# Patient Record
Sex: MALE | Employment: OTHER | URBAN - METROPOLITAN AREA
[De-identification: names, ages, dates, MRNs, and addresses within clinical notes are randomized per-mention and may not be internally consistent; named-entity substitution may affect disease eponyms.]

---

## 2017-04-03 ENCOUNTER — OFFICE VISIT (OUTPATIENT)
Dept: FAMILY MEDICINE CLINIC | Age: 72
End: 2017-04-03

## 2017-04-03 ENCOUNTER — HOSPITAL ENCOUNTER (OUTPATIENT)
Dept: LAB | Age: 72
Discharge: HOME | End: 2017-04-03
Payer: MEDICARE

## 2017-04-03 VITALS
DIASTOLIC BLOOD PRESSURE: 70 MMHG | HEIGHT: 67 IN | WEIGHT: 183 LBS | OXYGEN SATURATION: 95 % | TEMPERATURE: 98.9 F | SYSTOLIC BLOOD PRESSURE: 112 MMHG | BODY MASS INDEX: 28.72 KG/M2 | RESPIRATION RATE: 18 BRPM | HEART RATE: 59 BPM

## 2017-04-03 DIAGNOSIS — R42 DIZZINESS: ICD-10-CM

## 2017-04-03 DIAGNOSIS — Z85.46 HISTORY OF PROSTATE CANCER: ICD-10-CM

## 2017-04-03 DIAGNOSIS — R35.89 POLYURIA: ICD-10-CM

## 2017-04-03 DIAGNOSIS — J06.9 UPPER RESPIRATORY TRACT INFECTION, UNSPECIFIED TYPE: ICD-10-CM

## 2017-04-03 DIAGNOSIS — R42 DIZZINESS: Primary | ICD-10-CM

## 2017-04-03 PROBLEM — Z90.79 HISTORY OF RADICAL PROSTATECTOMY: Status: ACTIVE | Noted: 2017-04-03

## 2017-04-03 LAB
ANION GAP BLD CALC-SCNC: 9 MMOL/L (ref 3–18)
APPEARANCE UR: CLEAR
BILIRUB UR QL: NEGATIVE
BUN SERPL-MCNC: 21 MG/DL (ref 7–18)
BUN/CREAT SERPL: 15 (ref 12–20)
CALCIUM SERPL-MCNC: 8.2 MG/DL (ref 8.5–10.1)
CHLORIDE SERPL-SCNC: 102 MMOL/L (ref 100–108)
CO2 SERPL-SCNC: 25 MMOL/L (ref 21–32)
COLOR UR: YELLOW
CREAT SERPL-MCNC: 1.39 MG/DL (ref 0.6–1.3)
GLUCOSE SERPL-MCNC: 99 MG/DL (ref 74–99)
GLUCOSE UR STRIP.AUTO-MCNC: NEGATIVE MG/DL
HGB UR QL STRIP: NEGATIVE
KETONES UR QL STRIP.AUTO: NEGATIVE MG/DL
LEUKOCYTE ESTERASE UR QL STRIP.AUTO: NEGATIVE
NITRITE UR QL STRIP.AUTO: NEGATIVE
PH UR STRIP: 5 [PH] (ref 5–8)
POTASSIUM SERPL-SCNC: 4.2 MMOL/L (ref 3.5–5.5)
PROT UR STRIP-MCNC: NEGATIVE MG/DL
SODIUM SERPL-SCNC: 136 MMOL/L (ref 136–145)
SP GR UR REFRACTOMETRY: 1.02 (ref 1–1.03)
UROBILINOGEN UR QL STRIP.AUTO: 0.2 EU/DL (ref 0.2–1)

## 2017-04-03 PROCEDURE — 80048 BASIC METABOLIC PNL TOTAL CA: CPT | Performed by: INTERNAL MEDICINE

## 2017-04-03 PROCEDURE — 81003 URINALYSIS AUTO W/O SCOPE: CPT | Performed by: INTERNAL MEDICINE

## 2017-04-03 PROCEDURE — 36415 COLL VENOUS BLD VENIPUNCTURE: CPT | Performed by: INTERNAL MEDICINE

## 2017-04-03 PROCEDURE — 84153 ASSAY OF PSA TOTAL: CPT | Performed by: INTERNAL MEDICINE

## 2017-04-03 RX ORDER — AZITHROMYCIN 250 MG/1
TABLET, FILM COATED ORAL
Qty: 6 TAB | Refills: 0 | Status: SHIPPED | OUTPATIENT
Start: 2017-04-03 | End: 2017-04-08

## 2017-04-03 NOTE — PROGRESS NOTES
1. Have you been to the ER, urgent care clinic since your last visit? Hospitalized since your last visit? Yes, [facility] last week  , cough      2. Have you seen or consulted any other health care providers outside of the [facility] since your last visit? Include any pap smears or colon screening.  No

## 2017-04-03 NOTE — MR AVS SNAPSHOT
Visit Information Date & Time Provider Department Dept. Phone Encounter #  
 4/3/2017  1:15 PM Julstoney Wolfeau, 3 [facility] [phone] [id number] Upcoming Health Maintenance Date Due  
 MEDICARE YEARLY EXAM 9/3/2008 INFLUENZA AGE 9 TO ADULT 8/1/2016 GLAUCOMA SCREENING Q2Y 4/1/2018 COLON CANCER SCRN (BARIUM / CT COLO / FLX SIG) Q5 6/1/2020 DTaP/Tdap/Td series (2 - Td) 8/4/2026 Allergies as of 4/3/2017  Review Complete On: 4/3/2017 By: Juliaette Landau, MD  
 No Known Allergies Current Immunizations  Never Reviewed No immunizations on file. Not reviewed this visit You Were Diagnosed With   
  
 Codes Comments Dizziness    -  Primary ICD-10-CM: K14 ICD-9-CM: 780.4 Upper respiratory tract infection, unspecified type     ICD-10-CM: J06.9 ICD-9-CM: 465.9 Vitals BP Pulse Temp Resp Height(growth percentile) Weight(growth percentile) 112/70 (BP 1 Location: Left arm, BP Patient Position: Sitting) (!) 59 98.9 °F (37.2 °C) (Oral) 18 5' 7\" (1.702 m) 183 lb (83 kg) SpO2 BMI Smoking Status 95% 28.66 kg/m2 Never Smoker Vitals History BMI and BSA Data Body Mass Index Body Surface Area  
 28.66 kg/m 2 1.98 m 2 Preferred Pharmacy Pharmacy Name Phone [address] [place] [address] [phone] Your Updated Medication List  
  
   
This list is accurate as of: 4/3/17  1:36 PM.  Always use your most recent med list.  
  
  
  
  
 ascorbic acid (vitamin C) 500 mg tablet Commonly known as:  VITAMIN C Take  by mouth. aspirin delayed-release 81 mg tablet Take  by mouth daily. azithromycin 250 mg tablet Commonly known as:  Laban Mow Take 2 tablets today, then take 1 tablet daily BILBERRY PO Take  by mouth. COQ10  PO Take  by mouth.  
  
 cyanocobalamin 500 mcg tablet Commonly known as:  VITAMIN B12  
 Take 500 mcg by mouth daily. cyclobenzaprine 10 mg tablet Commonly known as:  FLEXERIL Take  by mouth three (3) times daily as needed for Muscle Spasm(s). gabapentin 300 mg capsule Commonly known as:  NEURONTIN Take 300 mg by mouth as needed. garlic 7,700 mg Cap Take  by mouth.  
  
 ginger (Zingiber officinalis) 250 mg Cap Take  by mouth.  
  
 ginkgo biloba 60 mg Cap Take  by mouth.  
  
 krill oil 500 mg Cap Take  by mouth.  
  
 lutein 40 mg Cap Take  by mouth.  
  
 meloxicam 7.5 mg tablet Commonly known as:  MOBIC Take 7.5 mg by mouth as needed for Pain.  
  
 metoprolol succinate 25 mg XL tablet Commonly known as:  TOPROL-XL Take 50 mg by mouth daily. montelukast 10 mg tablet Commonly known as:  SINGULAIR Take 1 Tab by mouth daily. multivitamin tablet Commonly known as:  ONE A DAY Take 1 Tab by mouth daily. pantoprazole 40 mg tablet Commonly known as:  PROTONIX Take 1 Tab by mouth daily. traMADol 50 mg tablet Commonly known as:  ULTRAM  
Take 50 mg by mouth every six (6) hours as needed for Pain.  
  
 vitamin E 400 unit capsule Commonly known as:  [address] Take  by mouth daily. Prescriptions Sent to Pharmacy Refills  
 azithromycin (ZITHROMAX) 250 mg tablet 0 Sig: Take 2 tablets today, then take 1 tablet daily Class: Normal  
 Pharmacy: [address] [place] [address] #: [phone] [facility]! Dear Kain Henson: Thank you for requesting a [employer] account. Our records indicate that you already have an active [email or web address] account. You can access your account anytime at [email or web address] Did you know that you can access your hospital and ER discharge instructions at any time in [email or web address]? You can also review all of your test results from your hospital stay or ER visit. Additional Information If you have questions, please visit the Frequently Asked Questions section of the Hyd[employer]art website at [email or web address] Remember, [employer] is NOT to be used for urgent needs. For medical emergencies, dial 911. Now available from your iPhone and Android! Please provide this summary of care documentation to your next provider. Your primary care clinician is listed as Katerin Ding. If you have any questions after today's visit, please call [phone].

## 2017-04-03 NOTE — PROGRESS NOTES
Chief Complaint   Patient presents with    Cough    Sore Throat     Assessment/Plan  1. Dizziness  -likely from low bp. If bp remains low, hold metoprolol. Keep monitoring bp at home. - METABOLIC PANEL, BASIC; Future    2. Upper respiratory tract infection, unspecified type  - azithromycin (ZITHROMAX) 250 mg tablet; Take 2 tablets today, then take 1 tablet daily  Dispense: 6 Tab; Refill: 0    3. History of prostate cancer, s/p radical prostatectomy. Ck psa.  - PSA W/ REFLX FREE PSA; Future    4. Polyuria  - URINALYSIS W/ RFLX MICROSCOPIC; Future    The plan was discussed with the patient. The patient verbalized understanding and is in agreement with the plan. All medication potential side effects were discussed with the patient. SUBJECTIVE:   Betty Falk is a 66 y.o. male who complains of nasal congestion, sore throat, vocal hoarseness, non-productive cough x 7 days. Was seen 3 days ago by [facility], treated with cough medication. No antibiotics. No f/c.  +arthralgia, diffusely. No muscle pain. +HA. Today he had episode of dizziness and fell. Does notes some dyspnea with exertion. He notes polyuria. Has h/o prostate ca, s/p radical prostatectomy. No change/increase in fluid intake.       Review of Systems - ENT ROS: positive for - headaches, nasal congestion, nasal discharge and sore throat  Respiratory ROS: positive for - cough  Cardiovascular ROS: no chest pain or dyspnea on exertion  Gastrointestinal ROS: no abdominal pain, change in bowel habits, or black or bloody stools  Musculoskeletal ROS: negative  Neurological ROS: negative    Physical Examination:   Visit Vitals    /70 (BP 1 Location: Left arm, BP Patient Position: Sitting)    Pulse (!) 59    Temp 98.9 °F (37.2 °C) (Oral)    Resp 18    Ht 5' 7\" (1.702 m)    Wt 183 lb (83 kg)    SpO2 95%    BMI 28.66 kg/m2       Constitutional: Well developed, nourished, no distress, alert   HENT: Exterior ears and tympanic membranes normal bilaterally. Supple neck. No thyromegaly or lymphadenopathy. Oropharynx clear and moist mucous membranes. Eyes: Conjunctiva normal. PERRL. CV: S1, S2.  RRR. No murmurs/rubs. No thrills palpated. No carotid bruits. Intact distal pulses. No edema. Pulm: No abnormalities on inspection. Clear to auscultation bilaterally. No wheezing/rhonchi. Normal effort.              Salima Collins MD

## 2017-04-04 LAB
PSA SERPL-MCNC: <0.1 NG/ML (ref 0–4)
REFLEX CRITERIA: NORMAL

## 2017-04-10 ENCOUNTER — TELEPHONE (OUTPATIENT)
Dept: FAMILY MEDICINE CLINIC | Age: 72
End: 2017-04-10

## 2017-04-10 NOTE — TELEPHONE ENCOUNTER
Pt's wife called in regards to the pt. He is also not feeling any better. He is still coughing but she states that his cough is more tight. Jazzminese advise.

## 2017-04-12 RX ORDER — PREDNISONE 10 MG/1
TABLET ORAL
Qty: 21 TAB | Refills: 0 | Status: SHIPPED | OUTPATIENT
Start: 2017-04-12 | End: 2017-11-16 | Stop reason: ALTCHOICE

## 2017-10-18 PROBLEM — K42.9 UMBILICAL HERNIA WITHOUT OBSTRUCTION AND WITHOUT GANGRENE: Status: ACTIVE | Noted: 2017-10-18

## 2017-11-16 ENCOUNTER — OFFICE VISIT (OUTPATIENT)
Dept: FAMILY MEDICINE CLINIC | Age: 72
End: 2017-11-16

## 2017-11-16 VITALS
WEIGHT: 182 LBS | SYSTOLIC BLOOD PRESSURE: 130 MMHG | BODY MASS INDEX: 28.56 KG/M2 | HEART RATE: 74 BPM | RESPIRATION RATE: 20 BRPM | TEMPERATURE: 98.1 F | DIASTOLIC BLOOD PRESSURE: 82 MMHG | OXYGEN SATURATION: 95 % | HEIGHT: 67 IN

## 2017-11-16 DIAGNOSIS — R10.13 EPIGASTRIC PAIN: ICD-10-CM

## 2017-11-16 DIAGNOSIS — K21.9 GASTROESOPHAGEAL REFLUX DISEASE, ESOPHAGITIS PRESENCE NOT SPECIFIED: Primary | ICD-10-CM

## 2017-11-16 DIAGNOSIS — N52.9 ERECTILE DYSFUNCTION, UNSPECIFIED ERECTILE DYSFUNCTION TYPE: ICD-10-CM

## 2017-11-16 DIAGNOSIS — R05.3 CHRONIC COUGH: ICD-10-CM

## 2017-11-16 NOTE — PROGRESS NOTES
Verner Cowden is a 74 y.o. male is here for cough. 1. Have you been to the ER, urgent care clinic since your last visit? Hospitalized since your last visit? No    2. Have you seen or consulted any other health care providers outside of the [address] [place] [address] since your last visit? Include any pap smears or colon screening.  No     Health Maintenance Due   Topic Date Due    MEDICARE YEARLY EXAM  09/03/2008    Influenza Age 5 to Adult  08/01/2017

## 2017-11-16 NOTE — MR AVS SNAPSHOT
Visit Information Date & Time Provider Department Dept. Phone Encounter #  
 11/16/2017  1:15 PM Erlin Carmen, 3 [facility] [phone] [id number] Upcoming Health Maintenance Date Due  
 MEDICARE YEARLY EXAM 9/3/2008 Influenza Age 5 to Adult 8/1/2017 GLAUCOMA SCREENING Q2Y 4/1/2018 COLON CANCER SCRN (BARIUM / CT COLO / FLX SIG) Q5 6/1/2020 DTaP/Tdap/Td series (2 - Td) 8/4/2026 Allergies as of 11/16/2017  Review Complete On: 11/16/2017 By: Erlin Carmen MD  
 No Known Allergies Current Immunizations  Never Reviewed No immunizations on file. Not reviewed this visit You Were Diagnosed With   
  
 Codes Comments Gastroesophageal reflux disease, esophagitis presence not specified    -  Primary ICD-10-CM: K21.9 ICD-9-CM: 530.81 Epigastric pain     ICD-10-CM: R10.13 ICD-9-CM: 789.06 Chronic cough     ICD-10-CM: R05 ICD-9-CM: 518. 2 Vitals BP Pulse Temp Resp Height(growth percentile) Weight(growth percentile) 130/82 (BP 1 Location: Left arm, BP Patient Position: Sitting) 74 98.1 °F (36.7 °C) (Oral) 20 5' 7\" (1.702 m) 182 lb (82.6 kg) SpO2 BMI Smoking Status 95% 28.51 kg/m2 Never Smoker BMI and BSA Data Body Mass Index Body Surface Area 28.51 kg/m 2 1.98 m 2 Preferred Pharmacy Pharmacy Name Phone [address] [place] [address] [phone] Your Updated Medication List  
  
   
This list is accurate as of: 11/16/17  1:34 PM.  Always use your most recent med list.  
  
  
  
  
 ascorbic acid (vitamin C) 500 mg tablet Commonly known as:  VITAMIN C Take  by mouth. aspirin delayed-release 81 mg tablet Take  by mouth daily. BILBERRY PO Take  by mouth. COQ10  PO Take  by mouth.  
  
 cyanocobalamin 500 mcg tablet Commonly known as:  VITAMIN B12 Take 500 mcg by mouth daily. garlic 2,202 mg Cap Take  by mouth.  
  
 cisco (Zingiber officinalis) 250 mg Cap Take  by mouth.  
  
 ginkgo biloba 60 mg Cap Take  by mouth.  
  
 krill oil 500 mg Cap Take  by mouth.  
  
 montelukast 10 mg tablet Commonly known as:  SINGULAIR Take 1 Tab by mouth daily. multivitamin tablet Commonly known as:  ONE A DAY Take 1 Tab by mouth daily. pantoprazole 40 mg tablet Commonly known as:  PROTONIX Take 1 Tab by mouth daily. vitamin E 400 unit capsule Commonly known as:  [address] Take  by mouth daily. We Performed the Following REFERRAL TO GASTROENTEROLOGY [YIN20 Custom] Comments:  
 Please evaluate patient for epigastric pain, gerd. To-Do List   
 Around 11/16/2017 PFT:  PULMONARY FUNCTION TEST Referral Information Referral ID Referred By Referred To  
  
 [id number] [facility] [place] [address] [address] [place], [address] [place] [address] Phone: [phone] Fax: [phone] Visits Status Start Date End Date 1 New Request 11/16/17 11/16/18 If your referral has a status of pending review or denied, additional information will be sent to support the outcome of this decision. Patient Instructions Lung Function Tests: About These Tests What are these tests? Lung function tests measure how much air your lungs hold and how quickly your lungs can move the air in and out. Spirometry is often the first lung function test that is done. You may also have other tests, such as gas diffusion tests, body plethysmography, inhalation challenge tests, and exercise stress tests. Your doctor will explain which tests you need. These tests check how well your lungs work. They may also be called pulmonary function tests, or PFTs. Why are these tests done? Doctors use lung function tests to find the cause of breathing problems and diagnose lung diseases like asthma or emphysema.  You may have lung function tests before you have surgery. Or your doctor may use lung function tests to find out how well treatment for a lung problem is working. How can you prepare for these tests? · Tell your doctor if you have any other medical problems, such as heart disease. · Tell your doctor if you are allergic to any medicines. · Let your doctor know if you take medicines for a lung problem. You may need to stop some of them before the tests. What else should you know before these tests? · Wear loose clothing that does not restrict your breathing. · If you have dentures, wear them during the test to help you form a tight seal around the spirometer's mouthpiece. · Do not eat a large meal just before the test. A full stomach may keep your lungs from fully expanding. · For 6 hours before the test, do not smoke or exercise hard. What happens during these tests? What happens during the test depends on the type of test you have. A respiratory therapist or technician will do the lung function tests. For most tests, you will wear a nose clip. This is to make sure that no air passes in or out of your nose during the test. You then breathe into a mouthpiece attached to a recording device. · For some tests, you breathe in and out as deeply and quickly as you can. · You may repeat some tests after you inhale a medicine that expands your airways. · You may breathe certain gases, such as 100% oxygen or a mixture of helium and air. · For body plethysmography, you sit inside a small mcnamara with windows. The mcnamara measures pressure changes that occur as you breathe. The therapist may urge you to breathe deeply during some of the tests to get the best results. You may have a blood test to check oxygen and carbon dioxide levels in your blood before, during, or after your lung function tests. How long do these tests take? The testing may take from 5 to 30 minutes, depending on how many tests you have. What happens after these tests? · You will probably be able to go home right after the tests. · You can go back to your normal activities right away. Follow-up care is a key part of your treatment and safety. Be sure to make and go to all appointments, and call your doctor if you are having problems. It's also a good idea to know your test results and keep a list of the medicines you take. Where can you learn more? Go to http://stephanie-karime.info/. Enter O827 in the search box to learn more about \"Lung Function Tests: About These Tests. \" Current as of: May 12, 2017 Content Version: 11.4 © 1078-3317 [employer]. Care instructions adapted under license by [employer] (which disclaims liability or warranty for this information). If you have questions about a medical condition or this instruction, always ask your healthcare professional. [address] any warranty or liability for your use of this information. Introducing [facility] & HEALTH SERVICES! Dear Vandana Prakash: Thank you for requesting a [employer] account. Our records indicate that you already have an active [employer] account. You can access your account anytime at [email or web address] Did you know that you can access your hospital and ER discharge instructions at any time in [employer]? You can also review all of your test results from your hospital stay or ER visit. Additional Information If you have questions, please visit the Frequently Asked Questions section of the [employer] website at [email or web address] Remember, [employer] is NOT to be used for urgent needs. For medical emergencies, dial 911. Now available from your iPhone and Android! Please provide this summary of care documentation to your next provider. Your primary care clinician is listed as Katerin Ding.  If you have any questions after today's visit, please call [phone].

## 2017-11-16 NOTE — PROGRESS NOTES
Assessment/Plan:    1. Gastroesophageal reflux disease, esophagitis presence not specified and epigastric pain - needs endo to r/o gastritis/pud.    - REFERRAL TO GASTROENTEROLOGY    2. Chronic cough  -ddx includes reflux related vs allergy/postnasal drip. Given dyspnea, will do PFts with methacholine challenge. Consider trial of flonase.   - PULMONARY FUNCTION TEST; Future    The plan was discussed with the patient. The patient verbalized understanding and is in agreement with the plan. All medication potential side effects were discussed with the patient. Health Maintenance:   Health Maintenance   Topic Date Due    MEDICARE YEARLY EXAM  09/03/2008    Influenza Age 9 to Adult  08/01/2017    GLAUCOMA SCREENING Q2Y  04/01/2018    COLON CANCER SCRN (BARIUM / CT COLO / FLX SIG) Q5  06/01/2020    DTaP/Tdap/Td series (2 - Td) 08/04/2026    ZOSTER VACCINE AGE 60>  Completed    Pneumococcal 65+ Low/Medium Risk  Completed       Amparo Desouza is a 74 y.o. male and presents with Cough and Breathing Problem     Subjective:  His wife helps present the hx. Pt c/o epigastric pain, burning sensation x several months. He [place] here. He's on protonix 40 bid for gerd and still having reflux sx. They are requesting GI referral.  Tried probiotics. Pt c/o \"throat closing up on him\" where he can't breathe and coughs. This happens randomly. Does have allergies, on singulair. Has been treated with immunotherapy as well. Has had swallow study, which was neg. Had neg fiberoptic laryngoscopy by ENT in [facility], who proposed PND as the source of his cough. He said his chronic cough started after starting lisinopril for bp. He is concerned about cancer b/c there is a [facility] prevalance of cancer in [address] [place]". He can't identify a trigger or pattern to the coughing episode. Hasn't had lung function test. Does have some dyspnea that is constant.      ROS:  Constitutional: No recent weight change. No weakness/fatigue. No f/c. HENT: No HA, dizziness. No hearing loss/tinnitus. No nasal congestion/discharge. Eyes: No change in vision, double/blurred vision or eye pain/redness. Cardiovascular: No CP/palpitations. No BURGER/orthopnea/PND. Respiratory: + cough/no sputum, +dyspnea, no wheezing. Gastointestinal: No dysphagia,+ reflux. No n/v. No constipation/diarrhea. No melena/rectal bleeding. Allergy/Immunology: + seasonal rhinitis. Denies frequent colds, sinus/ear infections. The problem list was updated as a part of today's visit. Patient Active Problem List   Diagnosis Code    Environmental allergies Z91.09    Gastroesophageal reflux disease without esophagitis K21.9    Hearing loss of left ear H91.92    Mixed hypercholesterolemia and hypertriglyceridemia E78.2    Chronic cough R05    Muscle weakness M62.81    History of prostate cancer Z85.46    History of radical prostatectomy X85.45    Umbilical hernia without obstruction and without gangrene K42.9       The PSH, FH were reviewed. SH:  Social History   Substance Use Topics    Smoking status: Never Smoker    Smokeless tobacco: Never Used    Alcohol use No       Medications/Allergies:  Current Outpatient Prescriptions on File Prior to Visit   Medication Sig Dispense Refill    pantoprazole (PROTONIX) 40 mg tablet Take 1 Tab by mouth daily. 90 Tab 3    montelukast (SINGULAIR) 10 mg tablet Take 1 Tab by mouth daily. 90 Tab 1    aspirin delayed-release 81 mg tablet Take  by mouth daily.  BILBERRY PO Take  by mouth.  UBIDECARENONE/VITAMIN E MIXED (COQ10  PO) Take  by mouth.  garlic 8,566 mg cap Take  by mouth.  cisco, Zingiber officinalis, 250 mg cap Take  by mouth.  ginkgo biloba 60 mg cap Take  by mouth.  krill oil 500 mg cap Take  by mouth.  multivitamin (ONE A DAY) tablet Take 1 Tab by mouth daily.       cyanocobalamin (VITAMIN B12) 500 mcg tablet Take 500 mcg by mouth daily.  ascorbic acid, vitamin C, (VITAMIN C) 500 mg tablet Take  by mouth.  vitamin E (AQUA GEMS) 400 unit capsule Take  by mouth daily. No current facility-administered medications on file prior to visit. No Known Allergies    Objective:  Visit Vitals    /82 (BP 1 Location: Left arm, BP Patient Position: Sitting)    Pulse 74    Temp 98.1 °F (36.7 °C) (Oral)    Resp 20    Ht 5' 7\" (1.702 m)    Wt 182 lb (82.6 kg)    SpO2 95%    BMI 28.51 kg/m2      Constitutional: Well developed, nourished, no distress, alert   HENT: Exterior ears and tympanic membranes normal bilaterally. Supple neck. No thyromegaly or lymphadenopathy. Oropharynx clear and moist mucous membranes. Eyes: Conjunctiva normal. PERRL. CV: S1, S2.  RRR. No murmurs/rubs. No thrills palpated. No carotid bruits. Intact distal pulses. No edema. Pulm: No abnormalities on inspection. Clear to auscultation bilaterally. No wheezing/rhonchi. Normal effort.

## 2017-11-16 NOTE — PATIENT INSTRUCTIONS
Lung Function Tests: About These Tests  What are these tests? Lung function tests measure how much air your lungs hold and how quickly your lungs can move the air in and out. Spirometry is often the first lung function test that is done. You may also have other tests, such as gas diffusion tests, body plethysmography, inhalation challenge tests, and exercise stress tests. Your doctor will explain which tests you need. These tests check how well your lungs work. They may also be called pulmonary function tests, or PFTs. Why are these tests done? Doctors use lung function tests to find the cause of breathing problems and diagnose lung diseases like asthma or emphysema. You may have lung function tests before you have surgery. Or your doctor may use lung function tests to find out how well treatment for a lung problem is working. How can you prepare for these tests? · Tell your doctor if you have any other medical problems, such as heart disease. · Tell your doctor if you are allergic to any medicines. · Let your doctor know if you take medicines for a lung problem. You may need to stop some of them before the tests. What else should you know before these tests? · Wear loose clothing that does not restrict your breathing. · If you have dentures, wear them during the test to help you form a tight seal around the spirometer's mouthpiece. · Do not eat a large meal just before the test. A full stomach may keep your lungs from fully expanding. · For 6 hours before the test, do not smoke or exercise hard. What happens during these tests? What happens during the test depends on the type of test you have. A respiratory therapist or technician will do the lung function tests. For most tests, you will wear a nose clip. This is to make sure that no air passes in or out of your nose during the test. You then breathe into a mouthpiece attached to a recording device.   · For some tests, you breathe in and out as deeply and quickly as you can. · You may repeat some tests after you inhale a medicine that expands your airways. · You may breathe certain gases, such as 100% oxygen or a mixture of helium and air. · For body plethysmography, you sit inside a small mcnamara with windows. The mcnamara measures pressure changes that occur as you breathe. The therapist may urge you to breathe deeply during some of the tests to get the best results. You may have a blood test to check oxygen and carbon dioxide levels in your blood before, during, or after your lung function tests. How long do these tests take? The testing may take from 5 to 30 minutes, depending on how many tests you have. What happens after these tests? · You will probably be able to go home right after the tests. · You can go back to your normal activities right away. Follow-up care is a key part of your treatment and safety. Be sure to make and go to all appointments, and call your doctor if you are having problems. It's also a good idea to know your test results and keep a list of the medicines you take. Where can you learn more? Go to http://stephanie-karime.info/. Enter C470 in the search box to learn more about \"Lung Function Tests: About These Tests. \"  Current as of: May 12, 2017  Content Version: 11.4  © 0399-4248 [employer]. Care instructions adapted under license by [employer] (which disclaims liability or warranty for this information). If you have questions about a medical condition or this instruction, always ask your healthcare professional. [address] any warranty or liability for your use of this information.

## 2017-12-18 DIAGNOSIS — R05.3 CHRONIC COUGH: ICD-10-CM

## 2018-01-02 PROBLEM — K21.00 REFLUX ESOPHAGITIS: Status: ACTIVE | Noted: 2018-01-02

## 2018-01-02 RX ORDER — LOSARTAN POTASSIUM 50 MG/1
50 TABLET ORAL DAILY
COMMUNITY
Start: 2018-01-02 | End: 2019-12-11 | Stop reason: ALTCHOICE

## 2018-03-15 ENCOUNTER — TELEPHONE (OUTPATIENT)
Dept: FAMILY MEDICINE CLINIC | Age: 73
End: 2018-03-15

## 2018-03-15 NOTE — TELEPHONE ENCOUNTER
[facility] states [facility] no longer carries Caverject and they now carry Edex 20mcg. Patient is request that an electronic prescription for the E[facility]

## 2018-03-15 NOTE — TELEPHONE ENCOUNTER
Please read him my mychart message -I've never prescribed this for him. What is he taking this for ED?

## 2018-03-16 NOTE — TELEPHONE ENCOUNTER
Spoke with patient, verified with 2 identifiers. Patient stated that he is unsure of who originally prescribed Caverject to him, he recalls it being Dr. Eula Seo a while back. Patient stated that he is using medication for ED however and Caverject is no longer manufactured according to [facility].

## 2018-03-19 PROBLEM — N52.9 ERECTILE DYSFUNCTION: Status: ACTIVE | Noted: 2018-03-19

## 2019-01-04 ENCOUNTER — OFFICE VISIT (OUTPATIENT)
Dept: FAMILY MEDICINE CLINIC | Age: 74
End: 2019-01-04

## 2019-01-04 VITALS
WEIGHT: 182 LBS | DIASTOLIC BLOOD PRESSURE: 70 MMHG | OXYGEN SATURATION: 95 % | BODY MASS INDEX: 28.56 KG/M2 | SYSTOLIC BLOOD PRESSURE: 110 MMHG | HEART RATE: 67 BPM | RESPIRATION RATE: 18 BRPM | HEIGHT: 67 IN | TEMPERATURE: 97.8 F

## 2019-01-04 DIAGNOSIS — R53.83 FATIGUE, UNSPECIFIED TYPE: ICD-10-CM

## 2019-01-04 DIAGNOSIS — M25.561 CHRONIC PAIN OF BOTH KNEES: ICD-10-CM

## 2019-01-04 DIAGNOSIS — M25.562 CHRONIC PAIN OF BOTH KNEES: ICD-10-CM

## 2019-01-04 DIAGNOSIS — G89.29 CHRONIC PAIN OF BOTH KNEES: ICD-10-CM

## 2019-01-04 DIAGNOSIS — M25.552 LEFT HIP PAIN: Primary | ICD-10-CM

## 2019-01-04 DIAGNOSIS — E78.2 MIXED HYPERCHOLESTEROLEMIA AND HYPERTRIGLYCERIDEMIA: ICD-10-CM

## 2019-01-04 DIAGNOSIS — E03.9 ACQUIRED HYPOTHYROIDISM: ICD-10-CM

## 2019-01-04 DIAGNOSIS — Z00.00 ROUTINE GENERAL MEDICAL EXAMINATION AT A HEALTH CARE FACILITY: ICD-10-CM

## 2019-01-04 RX ORDER — LORATADINE 10 MG/1
10 TABLET ORAL
COMMUNITY
End: 2019-11-13 | Stop reason: ALTCHOICE

## 2019-01-04 RX ORDER — LEVOTHYROXINE SODIUM 50 UG/1
100 TABLET ORAL
COMMUNITY
End: 2021-05-19 | Stop reason: ALTCHOICE

## 2019-01-04 RX ORDER — NAPROXEN 500 MG/1
500 TABLET ORAL 2 TIMES DAILY WITH MEALS
Qty: 180 TAB | Refills: 0 | Status: SHIPPED | OUTPATIENT
Start: 2019-01-04 | End: 2019-11-13 | Stop reason: ALTCHOICE

## 2019-01-04 RX ORDER — ALENDRONATE SODIUM 70 MG/1
70 TABLET ORAL
COMMUNITY

## 2019-01-04 NOTE — PROGRESS NOTES
Assessment/Plan: 1. Left hip pain 
-suspect related more to chronic bilat knee arthritis/gait. Referral ortho. Ck labs. And xray. Naprosyn prn. 
- XR HIP LT W OR WO PELV 2-3 VWS; Future 
- naproxen (NAPROSYN) 500 mg tablet; Take 1 Tab by mouth two (2) times daily (with meals). Dispense: 180 Tab; Refill: 0 
- CYCLIC CITRUL PEPTIDE AB, IGG; Future - JORGE QL, W/REFLEX CASCADE; Future 
- RHEUMATOID FACTOR, IGM; Future 
- REFERRAL TO ORTHOPEDICS 2. Mixed hypercholesterolemia and hypertriglyceridemia - METABOLIC PANEL, COMPREHENSIVE; Future - LIPID PANEL; Future 3. Routine general medical examination at a health care facility 
- CBC W/O DIFF; Future - METABOLIC PANEL, COMPREHENSIVE; Future - LIPID PANEL; Future 4. Fatigue, unspecified type 
- CBC W/O DIFF; Future 
- TSH 3RD GENERATION; Future 5. Acquired hypothyroidism 
- TSH 3RD GENERATION; Future The plan was discussed with the patient. The patient verbalized understanding and is in agreement with the plan. All medication potential side effects were discussed with the patient. Health Maintenance:  
Health Maintenance Topic Date Due  Shingrix Vaccine Age 50> (1 of 2) 09/03/1993  MEDICARE YEARLY EXAM  03/14/2018  GLAUCOMA SCREENING Q2Y  04/01/2018  Influenza Age 5 to Adult  08/01/2018  DTaP/Tdap/Td series (2 - Td) 08/04/2026  Pneumococcal 65+ Low/Medium Risk  Completed Janusz Adamson is a 74 y.o. male and presents with Hypertension and Arthritis Subjective: HTN - bp good. Due for labs. ARthritis - states in the past 3 mos, he's waking up with pain in L hip. Having a bm makes pain better. He also has low back pain, but this is chronic. Pain is better after moving around. Pain is mostly localized to butt. Walking makes it worse. Pain is 9/10 upon awakening, by afternoon, it's 2/10. He uses a cane to help with his knee pain.  He uses otc rubs with some relief. Tylenol helps. No numbness or tingling. Has some joint deformity of L middle finger PIP. 
ROS: 
Constitutional: No recent weight change. No weakness/+fatigue. No f/c. Cardiovascular: No CP/palpitations. No BURGER/orthopnea/PND. Respiratory: No cough/sputum, dyspnea, wheezing. Gastointestinal: No dysphagia, reflux. No n/v. No constipation/diarrhea. No melena/rectal bleeding. Genitourinary: No dysuria, urinary hesitancy, nocturia, hematuria. No incontinence. Musculoskeletal: + joint pain/stiffness. No muscle pain/tenderness. The problem list was updated as a part of today's visit. Patient Active Problem List  
Diagnosis Code  Environmental allergies Z91.09  
 Hearing loss of left ear H91.92  
 Mixed hypercholesterolemia and hypertriglyceridemia E78.2  Chronic cough R05  Muscle weakness M62.81  
 History of prostate cancer Z85.46  
 History of radical prostatectomy P00.85  
 Umbilical hernia without obstruction and without gangrene K42.9  Reflux esophagitis K21.0  Erectile dysfunction N52.9 The PSH, FH were reviewed. SH: Social History Tobacco Use  Smoking status: Never Smoker  Smokeless tobacco: Never Used Substance Use Topics  Alcohol use: No  
 Drug use: No  
 
 
Medications/Allergies: 
Current Outpatient Medications on File Prior to Visit Medication Sig Dispense Refill  avanafil (STENDRA) 200 mg tab tablet Take #1 tab PO PRN. Do not exceed #1 tab within 24 hour time period. 6 Tab 6  pantoprazole (PROTONIX) 40 mg tablet Take 1 Tab by mouth daily. 90 Tab 3  
 aspirin delayed-release 81 mg tablet Take  by mouth daily.  BILBERRY PO Take  by mouth.  garlic 2,670 mg cap Take  by mouth.  cisco, Zingiber officinalis, 250 mg cap Take  by mouth.  ginkgo biloba 60 mg cap Take  by mouth.  cyanocobalamin (VITAMIN B12) 500 mcg tablet Take 500 mcg by mouth daily.  ascorbic acid, vitamin C, (VITAMIN C) 500 mg tablet Take  by mouth.  FLUTICASONE PROPIONATE (FLONASE NA) by Nasal route.  losartan (COZAAR) 50 mg tablet Take 1 Tab by mouth daily.  UBIDECARENONE/VITAMIN E MIXED (COQ10  PO) Take  by mouth.  krill oil 500 mg cap Take  by mouth.  multivitamin (ONE A DAY) tablet Take 1 Tab by mouth daily.  vitamin E (AQUA GEMS) 400 unit capsule Take  by mouth daily. No current facility-administered medications on file prior to visit. Allergies Allergen Reactions  Amlodipine Besylate Other (comments) Edema, DIZZINESS Objective: 
Visit Vitals /70 (BP 1 Location: Left arm, BP Patient Position: Sitting) Pulse 67 Temp 97.8 °F (36.6 °C) (Oral) Resp 18 Ht 5' 7\" (1.702 m) Wt 182 lb (82.6 kg) SpO2 95% BMI 28.51 kg/m² Constitutional: Well developed, nourished, no distress, alert CV: S1, S2.  RRR. No murmurs/rubs. No thrills palpated. No carotid bruits. Intact distal pulses. No edema. No aortic bruits. Pulm: No abnormalities on inspection. Clear to auscultation bilaterally. No wheezing/rhonchi. Normal effort. MS: Gait normal. +crepitus in L knee. +pain with adduction and abduction of L hip. No trochanteric bursa tenderness.

## 2019-01-04 NOTE — PROGRESS NOTES
Adam Hernandez is a 74 y.o. male (: 1945) presenting to address: Chief Complaint Patient presents with  Hypertension  Arthritis Vitals:  
 19 1124 BP: 110/70 Pulse: 67 Resp: 18 Temp: 97.8 °F (36.6 °C) TempSrc: Oral  
SpO2: 95% Weight: 182 lb (82.6 kg) Height: 5' 7\" (1.702 m) PainSc:   2 PainLoc: Generalized Learning Assessment:  
 
Learning Assessment 2016 PRIMARY LEARNER Patient HIGHEST LEVEL OF EDUCATION - PRIMARY LEARNER  SOME COLLEGE  
LEARNER PREFERENCE PRIMARY DEMONSTRATION Depression Screening: PHQ over the last two weeks 2019 Little interest or pleasure in doing things Not at all Feeling down, depressed, irritable, or hopeless Not at all Total Score PHQ 2 0 Fall Risk Assessment:  
 
Fall Risk Assessment, last 12 mths 2019 Able to walk? Yes Fall in past 12 months? No  
 
Abuse Screening:  
 
Abuse Screening Questionnaire 2016 Do you ever feel afraid of your partner? Viral Bile Are you in a relationship with someone who physically or mentally threatens you? Viral Bile Is it safe for you to go home? Niki Hayes Coordination of Care Questionaire: 1. Have you been to the ER, urgent care clinic since your last visit? Hospitalized since your last visit? NO 
 
2. Have you seen or consulted any other health care providers outside of the [address] [place] [address] since your last visit? Include any pap smears or colon screening. YES urology Advanced Directive: 1. Do you have an Advanced Directive? YES 
 
2. Would you like information on Advanced Directives?  NO

## 2019-04-04 RX ORDER — SCOLOPAMINE TRANSDERMAL SYSTEM 1 MG/1
1 PATCH, EXTENDED RELEASE TRANSDERMAL
Qty: 5 PATCH | Refills: 0 | Status: SHIPPED | OUTPATIENT
Start: 2019-04-04 | End: 2019-07-15 | Stop reason: ALTCHOICE

## 2019-04-29 ENCOUNTER — OFFICE VISIT (OUTPATIENT)
Dept: FAMILY MEDICINE CLINIC | Age: 74
End: 2019-04-29

## 2019-04-29 VITALS
WEIGHT: 180 LBS | BODY MASS INDEX: 28.25 KG/M2 | OXYGEN SATURATION: 96 % | HEIGHT: 67 IN | TEMPERATURE: 97.7 F | DIASTOLIC BLOOD PRESSURE: 70 MMHG | RESPIRATION RATE: 18 BRPM | HEART RATE: 60 BPM | SYSTOLIC BLOOD PRESSURE: 120 MMHG

## 2019-04-29 DIAGNOSIS — I73.9 CLAUDICATION (HCC): Primary | ICD-10-CM

## 2019-04-29 DIAGNOSIS — S09.90XA INJURY OF HEAD, INITIAL ENCOUNTER: ICD-10-CM

## 2019-04-29 RX ORDER — GINKGO BILOBA LEAF EXTRACT 60 MG
CAPSULE ORAL
COMMUNITY

## 2019-04-29 RX ORDER — OMEGA-3S/DHA/EPA/FISH OIL/D3 300MG-1000
CAPSULE ORAL
COMMUNITY
End: 2019-11-13 | Stop reason: ALTCHOICE

## 2019-04-29 RX ORDER — PERPHENAZINE/AMITRIPTYLINE HCL 4 MG-25 MG
40 TABLET ORAL DAILY
COMMUNITY

## 2019-04-29 RX ORDER — ACETAMINOPHEN, DEXTROMETHORPHAN HYDROBROMIDE, DOXYLAMINE SUCCINATE, PHENYLEPHRINE HYDROCHLORIDE 650; 20; 12.5; 1 MG/30ML; MG/30ML; MG/30ML; MG/30ML
1500 SOLUTION ORAL
COMMUNITY

## 2019-04-29 RX ORDER — CHOLECALCIFEROL (VITAMIN D3) 125 MCG
100 CAPSULE ORAL DAILY
COMMUNITY

## 2019-04-29 NOTE — PROGRESS NOTES
Joesph Millan is a 74 y.o. male (: 1945) presenting to address: Chief Complaint Patient presents with  
 Head Pain Sore- personal injury  Leg Pain Right Vitals:  
 19 1036 BP: 120/70 Pulse: 60 Resp: 18 Temp: 97.7 °F (36.5 °C) TempSrc: Oral  
SpO2: 96% Weight: 180 lb (81.6 kg) Height: 5' 7\" (1.702 m) PainSc:   2 PainLoc: Head Learning Assessment:  
 
Learning Assessment 2016 PRIMARY LEARNER Patient HIGHEST LEVEL OF EDUCATION - PRIMARY LEARNER  SOME COLLEGE  
LEARNER PREFERENCE PRIMARY DEMONSTRATION Depression Screening:  
 
3 most recent PHQ Screens 2019 Little interest or pleasure in doing things Not at all Feeling down, depressed, irritable, or hopeless Not at all Total Score PHQ 2 0 Fall Risk Assessment:  
 
Fall Risk Assessment, last 12 mths 2019 Able to walk? Yes Fall in past 12 months? No  
 
Abuse Screening:  
 
Abuse Screening Questionnaire 2016 Do you ever feel afraid of your partner? Teresa Holliday Are you in a relationship with someone who physically or mentally threatens you? Teresa Holliday Is it safe for you to go home? Nikkie Warren Coordination of Care Questionaire: 1. Have you been to the ER, urgent care clinic since your last visit? Hospitalized since your last visit? NO 
 
2. Have you seen or consulted any other health care providers outside of the [address] [place] [address] since your last visit? Include any pap smears or colon screening. YES orthopedic Advanced Directive: 1. Do you have an Advanced Directive? YES 
 
2. Would you like information on Advanced Directives?  NO

## 2019-04-29 NOTE — PROGRESS NOTES
Assessment/Plan: 1. Claudication (HCC) 
-ck STEVEN. No concern for DVT. 2. Injury of head, initial encounter 
-discussed may take 1-3 more weeks to resolve. Likely bruised the bone The plan was discussed with the patient. The patient verbalized understanding and is in agreement with the plan. All medication potential side effects were discussed with the patient. Health Maintenance:  
Health Maintenance Topic Date Due  Shingrix Vaccine Age 50> (1 of 2) 09/03/1993  Pneumococcal 65+ years (1 of 2 - PCV13) 11/01/2008  MEDICARE YEARLY EXAM  03/14/2018  GLAUCOMA SCREENING Q2Y  04/01/2018  Influenza Age 5 to Adult  08/01/2019  COLONOSCOPY  02/08/2024  
 DTaP/Tdap/Td series (3 - Td) 08/04/2026 Luis Gunter is a 74 y.o. male and presents with Head Pain (Sore- personal injury) and Leg Pain (Right) Subjective: 
Pt hit his head on shed. He ended up breaking the skin. Then a few days later, hit his head on freezer in a different spot. Still has soreness overlying injured spots. This was 3 weeks ago. No HA. No difficulty concentrating. Pt also has pain in hamstring and calf in RLE when walking. No swelling. Patient and wife are concerned about DVT. No leg swelling. This has been ongoing x several months. Not relieved with rest immediately, but will resolve the next day. ROS: 
Constitutional: No recent weight change. No weakness/fatigue. No f/c. HENT: No HA, dizziness. No hearing loss/tinnitus. No nasal congestion/discharge. Eyes: No change in vision, double/blurred vision or eye pain/redness. Cardiovascular: No CP/palpitations. No BURGER/orthopnea/PND. Respiratory: No cough/sputum, dyspnea, wheezing. Gastointestinal: No dysphagia, reflux. No n/v. No constipation/diarrhea. No melena/rectal bleeding. The problem list was updated as a part of today's visit. Patient Active Problem List  
Diagnosis Code  Environmental allergies Z91.09  
  Hearing loss of left ear H91.92  
 Mixed hypercholesterolemia and hypertriglyceridemia E78.2  Chronic cough R05  Muscle weakness M62.81  
 History of prostate cancer Z85.46  
 History of radical prostatectomy N45.10  
 Umbilical hernia without obstruction and without gangrene K42.9  Reflux esophagitis K21.0  Erectile dysfunction N52.9 The PSH, FH were reviewed. SH: Social History Tobacco Use  Smoking status: Never Smoker  Smokeless tobacco: Never Used Substance Use Topics  Alcohol use: No  
 Drug use: No  
 
 
Medications/Allergies: 
Current Outpatient Medications on File Prior to Visit Medication Sig Dispense Refill  co-enzyme Q-10 (COQ-10) 100 mg capsule Take 100 mg by mouth daily.  ginseng 100 mg cap Take  by mouth.  lutein 40 mg cap Take 40 mg by mouth daily.  omega-3s-dha-epa-fish oil 1,000-1,400 mg cpDR Take  by mouth.  glucosam/nafisa-msm1/C/shaun/bosw (OSTEO BI-FLEX TRIPLE STRENGTH PO) Take  by mouth.  zinc 50 mg tab tablet Take  by mouth daily.  turmeric root extract 1,053 mg tab Take 1,500 mg by mouth.  scopolamine (TRANSDERM-SCOP) 1 mg over 3 days pt3d 1 Patch by TransDERmal route every seventy-two (72) hours. 5 Patch 0  
 alendronate (FOSAMAX) 70 mg tablet Take 70 mg by mouth every seven (7) days.  loratadine (CLARITIN) 10 mg tablet Take 10 mg by mouth.  levothyroxine (SYNTHROID) 50 mcg tablet Take 100 mcg by mouth Daily (before breakfast).  losartan (COZAAR) 50 mg tablet Take 1 Tab by mouth daily.  pantoprazole (PROTONIX) 40 mg tablet Take 1 Tab by mouth daily. 90 Tab 3  
 aspirin delayed-release 81 mg tablet Take  by mouth daily.  BILBERRY PO Take  by mouth.  UBIDECARENONE/VITAMIN E MIXED (COQ10  PO) Take  by mouth.  garlic 5,117 mg cap Take  by mouth.  cisco, Zingiber officinalis, 250 mg cap Take  by mouth.  ginkgo biloba 60 mg cap Take  by mouth.  krill oil 500 mg cap Take  by mouth.  multivitamin (ONE A DAY) tablet Take 1 Tab by mouth daily.  cyanocobalamin (VITAMIN B12) 500 mcg tablet Take 500 mcg by mouth daily.  ascorbic acid, vitamin C, (VITAMIN C) 500 mg tablet Take  by mouth.  vitamin E (AQUA GEMS) 400 unit capsule Take  by mouth daily.  naproxen (NAPROSYN) 500 mg tablet Take 1 Tab by mouth two (2) times daily (with meals). 180 Tab 0  
 avanafil (STENDRA) 200 mg tab tablet Take #1 tab PO PRN. Do not exceed #1 tab within 24 hour time period. 6 Tab 6  FLUTICASONE PROPIONATE (FLONASE NA) by Nasal route. No current facility-administered medications on file prior to visit. Allergies Allergen Reactions  Amlodipine Besylate Other (comments) Edema, DIZZINESS (per patient not dizziness but severe cough) Objective: 
Visit Vitals /70 (BP 1 Location: Left arm, BP Patient Position: Sitting) Pulse 60 Temp 97.7 °F (36.5 °C) (Oral) Resp 18 Ht 5' 7\" (1.702 m) Wt 180 lb (81.6 kg) SpO2 96% BMI 28.19 kg/m² Constitutional: Well developed, nourished, no distress, alert, obese habitus HENT: No obvious abnormalities with palpation of skull/scalp CV: S1, S2.  RRR. No murmurs/rubs. Pulm: No abnormalities on inspection. Clear to auscultation bilaterally. No wheezing/rhonchi. Normal effort. MS: Gait normal.  Joints without deformity/tenderness. No calf tenderness or palpable cords.

## 2019-05-17 DIAGNOSIS — I73.9 CLAUDICATION (HCC): ICD-10-CM

## 2019-07-03 ENCOUNTER — OFFICE VISIT (OUTPATIENT)
Dept: FAMILY MEDICINE CLINIC | Age: 74
End: 2019-07-03

## 2019-07-03 VITALS
OXYGEN SATURATION: 95 % | WEIGHT: 173 LBS | TEMPERATURE: 98.3 F | RESPIRATION RATE: 16 BRPM | SYSTOLIC BLOOD PRESSURE: 136 MMHG | BODY MASS INDEX: 27.15 KG/M2 | HEART RATE: 57 BPM | DIASTOLIC BLOOD PRESSURE: 80 MMHG | HEIGHT: 67 IN

## 2019-07-03 DIAGNOSIS — J06.9 UPPER RESPIRATORY TRACT INFECTION, UNSPECIFIED TYPE: Primary | ICD-10-CM

## 2019-07-03 DIAGNOSIS — R05.9 COUGH: ICD-10-CM

## 2019-07-03 RX ORDER — ATORVASTATIN CALCIUM 20 MG/1
TABLET, FILM COATED ORAL DAILY
COMMUNITY

## 2019-07-03 RX ORDER — DOXYCYCLINE 100 MG/1
100 TABLET ORAL 2 TIMES DAILY
Qty: 20 TAB | Refills: 0 | Status: SHIPPED | OUTPATIENT
Start: 2019-07-03 | End: 2019-07-13

## 2019-07-03 RX ORDER — HYDROCODONE POLISTIREX AND CHLORPHENIRAMINE POLISTIREX 10; 8 MG/5ML; MG/5ML
5 SUSPENSION, EXTENDED RELEASE ORAL
Qty: 75 ML | Refills: 0 | Status: SHIPPED | OUTPATIENT
Start: 2019-07-03 | End: 2019-07-15 | Stop reason: ALTCHOICE

## 2019-07-03 RX ORDER — BENZONATATE 200 MG/1
200 CAPSULE ORAL
Qty: 30 CAP | Refills: 0 | Status: SHIPPED | OUTPATIENT
Start: 2019-07-03 | End: 2019-07-15 | Stop reason: ALTCHOICE

## 2019-07-03 NOTE — PATIENT INSTRUCTIONS

## 2019-07-03 NOTE — PROGRESS NOTES
Christine Bui is a 74 y.o. male (: 1945) presenting to address:    Chief Complaint   Patient presents with    Cough    Sore Throat       Vitals:    19 1353   BP: 136/80   Pulse: (!) 57   Resp: 16   Temp: 98.3 °F (36.8 °C)   SpO2: 95%   Weight: 173 lb (78.5 kg)   Height: 5' 7\" (1.702 m)   PainSc:   7   PainLoc: Throat       Hearing/Vision:   No exam data present    Learning Assessment:     Learning Assessment 2016   PRIMARY LEARNER Patient   HIGHEST LEVEL OF EDUCATION - PRIMARY LEARNER  SOME COLLEGE   LEARNER PREFERENCE PRIMARY DEMONSTRATION     Depression Screening:     3 most recent PHQ Screens 2019   Little interest or pleasure in doing things Not at all   Feeling down, depressed, irritable, or hopeless Not at all   Total Score PHQ 2 0     Fall Risk Assessment:     Fall Risk Assessment, last 12 mths 7/3/2019   Able to walk? Yes   Fall in past 12 months? No     Abuse Screening:     Abuse Screening Questionnaire 2016   Do you ever feel afraid of your partner? N   Are you in a relationship with someone who physically or mentally threatens you? N   Is it safe for you to go home? Y     Coordination of Care Questionaire:   1. Have you been to the ER, urgent care clinic since your last visit? Hospitalized since your last visit? NO    2. Have you seen or consulted any other health care providers outside of the [address] [place] [address] since your last visit? Include any pap smears or colon screening. NO    Advanced Directive:   1. Do you have an Advanced Directive? NO    2. Would you like information on Advanced Directives?  NO

## 2019-07-03 NOTE — PROGRESS NOTES
Assessment/Plan:    *Diagnoses and all orders for this visit:    1. Upper respiratory tract infection, unspecified type  -     doxycycline (ADOXA) 100 mg tablet; Take 1 Tab by mouth two (2) times a day for 10 days. -     chlorpheniramine-HYDROcodone (TUSSIONEX) 10-8 mg/5 mL suspension; Take 5 mL by mouth nightly as needed for Cough for up to 15 days. Max Daily Amount: 5 mL. -     CBC WITH AUTOMATED DIFF; Future    2. Cough  -     XR CHEST PA LAT; Future  -     benzonatate (TESSALON) 200 mg capsule; Take 1 Cap by mouth three (3) times daily as needed for Cough. -     chlorpheniramine-HYDROcodone (TUSSIONEX) 10-8 mg/5 mL suspension; Take 5 mL by mouth nightly as needed for Cough for up to 15 days. Max Daily Amount: 5 mL. Pt was instructed to call on Friday and ask for my nurse, who will be able to give him the results of his CXR. The plan was discussed with the patient. The patient verbalized understanding and is in agreement with the plan. All medication potential side effects were discussed with the patient.    -------------------------------------------------------------------------------------------------------------------        Marcel Vilchis is a 74 y.o. male and presents with Cough and Sore Throat          Subjective:  About 1 month ago, went to [place]. While there, he developed a sinus infection, was seen by doctor there and given Amoxicillin. He has completed this but during the course of this, he developed a cough which has gotten worse and very bothersome. He can not sleep at night, coughs randomly through the day. Is concerned b/c he is about to go on a cruise with his family and wants to make sure he is better and can be around others. ROS:  Review of Systems - Negative except as above        The problem list was updated as a part of today's visit.   Patient Active Problem List   Diagnosis Code    Environmental allergies Z91.09    Hearing loss of left ear H91.92    Mixed hypercholesterolemia and hypertriglyceridemia E78.2    Chronic cough R05    Muscle weakness M62.81    History of prostate cancer Z85.46    History of radical prostatectomy E43.46    Umbilical hernia without obstruction and without gangrene K42.9    Reflux esophagitis K21.0    Erectile dysfunction N52.9       The PSH, FH were reviewed. SH:  Social History     Tobacco Use    Smoking status: Never Smoker    Smokeless tobacco: Never Used   Substance Use Topics    Alcohol use: No    Drug use: No       Medications/Allergies:  Current Outpatient Medications on File Prior to Visit   Medication Sig Dispense Refill    atorvastatin (LIPITOR) 20 mg tablet Take  by mouth daily.  lutein 40 mg cap Take 40 mg by mouth daily.  glucosam/nafisa-msm1/C/shaun/bosw (OSTEO BI-FLEX TRIPLE STRENGTH PO) Take  by mouth.  zinc 50 mg tab tablet Take  by mouth daily.  turmeric root extract 1,053 mg tab Take 1,500 mg by mouth.  levothyroxine (SYNTHROID) 50 mcg tablet Take 100 mcg by mouth Daily (before breakfast).  losartan (COZAAR) 50 mg tablet Take 1 Tab by mouth daily.  pantoprazole (PROTONIX) 40 mg tablet Take 1 Tab by mouth daily. 90 Tab 3    aspirin delayed-release 81 mg tablet Take  by mouth daily.  BILBERRY PO Take  by mouth.  UBIDECARENONE/VITAMIN E MIXED (COQ10  PO) Take  by mouth.  krill oil 500 mg cap Take  by mouth.  multivitamin (ONE A DAY) tablet Take 1 Tab by mouth daily.  vitamin E (AQUA GEMS) 400 unit capsule Take  by mouth daily.  co-enzyme Q-10 (COQ-10) 100 mg capsule Take 100 mg by mouth daily.  ginseng 100 mg cap Take  by mouth.  omega-3s-dha-epa-fish oil 1,000-1,400 mg cpDR Take  by mouth.  scopolamine (TRANSDERM-SCOP) 1 mg over 3 days pt3d 1 Patch by TransDERmal route every seventy-two (72) hours. 5 Patch 0    alendronate (FOSAMAX) 70 mg tablet Take 70 mg by mouth every seven (7) days.       loratadine (CLARITIN) 10 mg tablet Take 10 mg by mouth.  naproxen (NAPROSYN) 500 mg tablet Take 1 Tab by mouth two (2) times daily (with meals). 180 Tab 0    avanafil (STENDRA) 200 mg tab tablet Take #1 tab PO PRN. Do not exceed #1 tab within 24 hour time period. 6 Tab 6    FLUTICASONE PROPIONATE (FLONASE NA) by Nasal route.  garlic 1,749 mg cap Take  by mouth.  cisco, Zingiber officinalis, 250 mg cap Take  by mouth.  ginkgo biloba 60 mg cap Take  by mouth.  cyanocobalamin (VITAMIN B12) 500 mcg tablet Take 500 mcg by mouth daily.  ascorbic acid, vitamin C, (VITAMIN C) 500 mg tablet Take  by mouth. No current facility-administered medications on file prior to visit. Allergies   Allergen Reactions    Amlodipine Besylate Other (comments)     Edema, DIZZINESS (per patient not dizziness but severe cough)         Health Maintenance:   Health Maintenance   Topic Date Due    Shingrix Vaccine Age 49> (1 of 2) 09/03/1993    Pneumococcal 65+ years (1 of 2 - PCV13) 11/01/2008    MEDICARE YEARLY EXAM  03/14/2018    GLAUCOMA SCREENING Q2Y  04/01/2018    Influenza Age 5 to Adult  08/01/2019    COLONOSCOPY  02/08/2024    DTaP/Tdap/Td series (4 - Td) 04/28/2029       Objective:  Visit Vitals  /80 (BP 1 Location: Left arm, BP Patient Position: Sitting)   Pulse (!) 57   Temp 98.3 °F (36.8 °C)   Resp 16   Ht 5' 7\" (1.702 m)   Wt 173 lb (78.5 kg)   SpO2 95%   BMI 27.10 kg/m²          Nurses notes and VS reviewed. Physical Examination: General appearance - alert, well appearing, and in no distress  Mouth - erythematous  Neck - supple, no significant adenopathy  Chest - rhonchi noted mild and scattered.   Heart - normal rate and regular rhythm          Labwork and Ancillary Studies:    CBC w/Diff  Lab Results   Component Value Date/Time    WBC 5.5 08/05/2016 09:14 AM    HGB 15.1 08/05/2016 09:14 AM    PLATELET 780 48/65/3308 09:14 AM         Basic Metabolic Profile  Lab Results   Component Value Date/Time    Sodium 136 04/03/2017 01:55 PM    Potassium 4.2 04/03/2017 01:55 PM    Chloride 102 04/03/2017 01:55 PM    CO2 25 04/03/2017 01:55 PM    Anion gap 9 04/03/2017 01:55 PM    Glucose 99 04/03/2017 01:55 PM    BUN 21 (H) 04/03/2017 01:55 PM    Creatinine 1.39 (H) 04/03/2017 01:55 PM    BUN/Creatinine ratio 15 04/03/2017 01:55 PM    GFR est AA >60 04/03/2017 01:55 PM    GFR est non-AA 50 (L) 04/03/2017 01:55 PM    Calcium 8.2 (L) 04/03/2017 01:55 PM         LFT  Lab Results   Component Value Date/Time    ALT (SGPT) 22 08/05/2016 09:14 AM    AST (SGOT) 10 (L) 08/05/2016 09:14 AM    Alk.  phosphatase 60 08/05/2016 09:14 AM    Bilirubin, total 0.4 08/05/2016 09:14 AM         Cholesterol  Lab Results   Component Value Date/Time    Cholesterol, total 226 (H) 08/05/2016 09:14 AM    HDL Cholesterol 43 08/05/2016 09:14 AM    LDL, calculated 146.8 (H) 08/05/2016 09:14 AM    Triglyceride 181 (H) 08/05/2016 09:14 AM    CHOL/HDL Ratio 5.3 (H) 08/05/2016 09:14 AM

## 2019-07-05 ENCOUNTER — TELEPHONE (OUTPATIENT)
Dept: FAMILY MEDICINE CLINIC | Age: 74
End: 2019-07-05

## 2019-07-05 NOTE — TELEPHONE ENCOUNTER
cld pt, negative chest xray. Had to call [facility], it was still in process after 2 days. Pt received result and says he will follow up in a week or so.

## 2019-07-15 ENCOUNTER — OFFICE VISIT (OUTPATIENT)
Dept: FAMILY MEDICINE CLINIC | Age: 74
End: 2019-07-15

## 2019-07-15 VITALS
TEMPERATURE: 98.1 F | HEIGHT: 67 IN | OXYGEN SATURATION: 94 % | RESPIRATION RATE: 18 BRPM | HEART RATE: 61 BPM | SYSTOLIC BLOOD PRESSURE: 123 MMHG | DIASTOLIC BLOOD PRESSURE: 80 MMHG | WEIGHT: 175.4 LBS | BODY MASS INDEX: 27.53 KG/M2

## 2019-07-15 DIAGNOSIS — Z23 ENCOUNTER FOR IMMUNIZATION: ICD-10-CM

## 2019-07-15 DIAGNOSIS — Z00.00 MEDICARE ANNUAL WELLNESS VISIT, SUBSEQUENT: Primary | ICD-10-CM

## 2019-07-15 DIAGNOSIS — H91.90 HEARING LOSS, UNSPECIFIED HEARING LOSS TYPE, UNSPECIFIED LATERALITY: ICD-10-CM

## 2019-07-15 NOTE — PROGRESS NOTES
This is the Subsequent Medicare Annual Wellness Exam, performed 12 months or more after the Initial AWV or the last Subsequent AWV    I have reviewed the patient's medical history in detail and updated the computerized patient record. History     Past Medical History:   Diagnosis Date    Cancer ([facility]     GERD (gastroesophageal reflux disease)     History of MRSA infection     Hypertension     Prostate CA ([facility] [address] [facility]     radical prostatectomy      Past Surgical History:   Procedure Laterality Date    HX APPENDECTOMY      HX GI      HX KNEE ARTHROSCOPY      arthroscopic knee    HX ORTHOPAEDIC      L4-5 facetectomy    HX RADICAL PROSTATECTOMY      HX TONSILLECTOMY       Current Outpatient Medications   Medication Sig Dispense Refill    atorvastatin (LIPITOR) 20 mg tablet Take  by mouth daily.  co-enzyme Q-10 (COQ-10) 100 mg capsule Take 100 mg by mouth daily.  ginseng 100 mg cap Take  by mouth.  lutein 40 mg cap Take 40 mg by mouth daily.  omega-3s-dha-epa-fish oil 1,000-1,400 mg cpDR Take  by mouth.  glucosam/nafisa-msm1/C/shaun/bosw (OSTEO BI-FLEX TRIPLE STRENGTH PO) Take  by mouth.  zinc 50 mg tab tablet Take  by mouth daily.  turmeric root extract 1,053 mg tab Take 1,500 mg by mouth.  alendronate (FOSAMAX) 70 mg tablet Take 70 mg by mouth every seven (7) days.  loratadine (CLARITIN) 10 mg tablet Take 10 mg by mouth.  levothyroxine (SYNTHROID) 50 mcg tablet Take 100 mcg by mouth Daily (before breakfast).  naproxen (NAPROSYN) 500 mg tablet Take 1 Tab by mouth two (2) times daily (with meals). 180 Tab 0    avanafil (STENDRA) 200 mg tab tablet Take #1 tab PO PRN. Do not exceed #1 tab within 24 hour time period. 6 Tab 6    FLUTICASONE PROPIONATE (FLONASE NA) by Nasal route.  losartan (COZAAR) 50 mg tablet Take 1 Tab by mouth daily.  pantoprazole (PROTONIX) 40 mg tablet Take 1 Tab by mouth daily.  90 Tab 3    aspirin delayed-release 81 mg tablet Take  by mouth daily.  BILBERRY PO Take  by mouth.  UBIDECARENONE/VITAMIN E MIXED (COQ10  PO) Take  by mouth.  garlic 3,919 mg cap Take  by mouth.  cisco, Zingiber officinalis, 250 mg cap Take  by mouth.  ginkgo biloba 60 mg cap Take  by mouth.  krill oil 500 mg cap Take  by mouth.  multivitamin (ONE A DAY) tablet Take 1 Tab by mouth daily.  cyanocobalamin (VITAMIN B12) 500 mcg tablet Take 500 mcg by mouth daily.  ascorbic acid, vitamin C, (VITAMIN C) 500 mg tablet Take  by mouth.  vitamin E (AQUA GEMS) 400 unit capsule Take  by mouth daily. Allergies   Allergen Reactions    Amlodipine Besylate Other (comments)     Edema, DIZZINESS (per patient not dizziness but severe cough)     Family History   Problem Relation Age of Onset    Macular Degen Mother     Stroke Father     Cancer Father      Social History     Tobacco Use    Smoking status: Never Smoker    Smokeless tobacco: Never Used   Substance Use Topics    Alcohol use: No     Patient Active Problem List   Diagnosis Code    Environmental allergies Z91.09    Hearing loss of left ear H91.92    Mixed hypercholesterolemia and hypertriglyceridemia E78.2    Chronic cough R05    Muscle weakness M62.81    History of prostate cancer Z85.46    History of radical prostatectomy Y14.06    Umbilical hernia without obstruction and without gangrene K42.9    Reflux esophagitis K21.0    Erectile dysfunction N52.9       Depression Risk Factor Screening:     3 most recent PHQ Screens 7/15/2019   Little interest or pleasure in doing things Not at all   Feeling down, depressed, irritable, or hopeless Not at all   Total Score PHQ 2 0     Alcohol Risk Factor Screening: You do not drink alcohol or very rarely. Functional Ability and Level of Safety:   Hearing Loss  Hearing is good.     Activities of Daily Living  The home contains: handrails  Patient does total self care    Fall Risk  Fall Risk Assessment, last 12 mths 7/3/2019   Able to walk? Yes   Fall in past 12 months? No     Abuse Screen  Patient is not abused    Cognitive Screening   Evaluation of Cognitive Function:  Has your family/caregiver stated any concerns about your memory: no  Normal    Patient Care Team   Patient Care Team:  Deb Velarde MD as PCP - General (Internal Medicine)    Assessment/Plan   Education and counseling provided:  Are appropriate based on today's review and evaluation  End-of-Life planning (with patient's consent)  Pneumococcal Vaccine    Diagnoses and all orders for this visit:    1. Medicare annual wellness visit, subsequent    2.  Encounter for immunization  -     PNEUMOCOCCAL CONJ VACCINE 13 VALENT IM  -     ADMIN PNEUMOCOCCAL VACCINE        Health Maintenance Due   Topic Date Due    Pneumococcal 65+ years (1 of 2 - PCV13) 11/01/2008

## 2019-07-15 NOTE — PROGRESS NOTES
Lashell Trujillo is a 74 y.o. male (: 1945) presenting to address:    Chief Complaint   Patient presents with    Annual Wellness Visit       Vitals:    07/15/19 1312   BP: 123/80   Pulse: 61   Resp: 18   Temp: 98.1 °F (36.7 °C)   TempSrc: Oral   SpO2: 94%   Weight: 175 lb 6.4 oz (79.6 kg)   Height: 5' 7\" (1.702 m)   PainSc:   4   PainLoc: Throat       Hearing/Vision:      Visual Acuity Screening    Right eye Left eye Both eyes   Without correction:      With correction: 20/25 20/30-1 20/25       Learning Assessment:     Learning Assessment 2016   PRIMARY LEARNER Patient   HIGHEST LEVEL OF EDUCATION - PRIMARY LEARNER  SOME COLLEGE   LEARNER PREFERENCE PRIMARY DEMONSTRATION     Depression Screening:     3 most recent PHQ Screens 7/15/2019   Little interest or pleasure in doing things Not at all   Feeling down, depressed, irritable, or hopeless Not at all   Total Score PHQ 2 0     Fall Risk Assessment:     Fall Risk Assessment, last 12 mths 7/15/2019   Able to walk? Yes   Fall in past 12 months? No     Abuse Screening:     Abuse Screening Questionnaire 7/15/2019   Do you ever feel afraid of your partner? N   Are you in a relationship with someone who physically or mentally threatens you? N   Is it safe for you to go home? Y     Coordination of Care Questionaire:   1. Have you been to the ER, urgent care clinic since your last visit? Hospitalized since your last visit? NO    2. Have you seen or consulted any other health care providers outside of the [address] [place] [address] since your last visit? Include any pap smears or colon screening. NO    Advanced Directive:   1. Do you have an Advanced Directive? YES    2. Would you like information on Advanced Directives?  NO

## 2019-07-15 NOTE — PATIENT INSTRUCTIONS
Medicare Wellness Visit, Male The best way to live healthy is to have a lifestyle where you eat a well-balanced diet, exercise regularly, limit alcohol use, and quit all forms of tobacco/nicotine, if applicable. Regular preventive services are another way to keep healthy. Preventive services (vaccines, screening tests, monitoring & exams) can help personalize your care plan, which helps you manage your own care. Screening tests can find health problems at the earliest stages, when they are easiest to treat. [facility] follows the current, evidence-based guidelines published by the [facility] ([facility]) when recommending preventive services for our patients. Because we follow these guidelines, sometimes recommendations change over time as research supports it. (For example, a prostate screening blood test is no longer routinely recommended for men with no symptoms.) Of course, you and your doctor may decide to screen more often for some diseases, based on your risk and co-morbidities (chronic disease you are already diagnosed with). Preventive services for you include: - Medicare offers their members a free annual wellness visit, which is time for you and your primary care provider to discuss and plan for your preventive service needs. Take advantage of this benefit every year! 
-All adults over age 72 should receive the recommended pneumonia vaccines. Current USPSTF guidelines recommend a series of two vaccines for the best pneumonia protection.  
-All adults should have a flu vaccine yearly and an ECG.  All adults age 61 and older should receive a shingles vaccine once in their lifetime.   
-All adults age 38-68 who are overweight should have a diabetes screening test once every three years.  
-Other screening tests & preventive services for persons with diabetes include: an eye exam to screen for diabetic retinopathy, a kidney function test, a foot exam, and stricter control over your cholesterol.  
-Cardiovascular screening for adults with routine risk involves an electrocardiogram (ECG) at intervals determined by the provider.  
-Colorectal cancer screening should be done for adults age 54-65 with no increased risk factors for colorectal cancer. There are a number of acceptable methods of screening for this type of cancer. Each test has its own benefits and drawbacks. Discuss with your provider what is most appropriate for you during your annual wellness visit. The different tests include: colonoscopy (considered the best screening method), a fecal occult blood test, a fecal DNA test, and sigmoidoscopy. 
-All adults born between [facility] should be screened once for Hepatitis C. 
-An Abdominal Aortic Aneurysm (AAA) Screening is recommended for men age 73-68 who has ever smoked in their lifetime. Here is a list of your current Health Maintenance items (your personalized list of preventive services) with a due date: 
Health Maintenance Due Topic Date Due  Pneumococcal Vaccine (1 of 2 - PCV13) 11/01/2008 [address] [place] [address] Annual Well Visit  03/14/2018  Glaucoma Screening   04/01/2018 Advance Directives: Care Instructions Your Care Instructions An advance directive is a legal way to state your wishes at the end of your life. It tells your family and your doctor what to do if you can no longer say what you want. There are two main types of advance directives. You can change them any time that your wishes change. · A living will tells your family and your doctor your wishes about life support and other treatment. · A durable power of  for health care lets you name a person to make treatment decisions for you when you can't speak for yourself. This person is called a health care agent. If you do not have an advance directive, decisions about your medical care may be made by a doctor or a  who doesn't know you. It may help to think of an advance directive as a gift to the people who care for you. If you have one, they won't have to make tough decisions by themselves. Follow-up care is a key part of your treatment and safety. Be sure to make and go to all appointments, and call your doctor if you are having problems. It's also a good idea to know your test results and keep a list of the medicines you take. How can you care for yourself at home? · Discuss your wishes with your loved ones and your doctor. This way, there are no surprises. · Many states have a unique form. Or you might use a universal form that has been approved by many states. This kind of form can sometimes be completed and stored online. Your electronic copy will then be available wherever you have a connection to the Internet. In most cases, doctors will respect your wishes even if you have a form from a different state. · You don't need a  to do an advance directive. But you may want to get legal advice. · Think about these questions when you prepare an advance directive: 
? Who do you want to make decisions about your medical care if you are not able to? Many people choose a family member or close friend. ? Do you know enough about life support methods that might be used? If not, talk to your doctor so you understand. ? What are you most afraid of that might happen? You might be afraid of having pain, losing your independence, or being kept alive by machines. ? Where would you prefer to die? Choices include your home, a hospital, or a nursing home. ? Would you like to have information about hospice care to support you and your family? ? Do you want to donate organs when you die? ? Do you want certain Taoism practices performed before you die? If so, put your wishes in the advance directive. · Read your advance directive every year, and make changes as needed. When should you call for help? Be sure to contact your doctor if you have any questions. Where can you learn more? Go to http://stephanie-karime.info/. Enter R264 in the search box to learn more about \"Advance Directives: Care Instructions. \" Current as of: April 18, 2018 Content Version: 11.9 © 0689-5625 [employer]. Care instructions adapted under license by [employer] (which disclaims liability or warranty for this information). If you have questions about a medical condition or this instruction, always ask your healthcare professional. [address] any warranty or liability for your use of this information.

## 2019-07-15 NOTE — PROGRESS NOTES
Immunization/s administered 7/15/2019 by Rosamaria Liu LPN with guardian's consent. Patient tolerated procedure well. No reactions noted. Prevnar 13 right deltoid.

## 2019-07-16 ENCOUNTER — HOSPITAL ENCOUNTER (OUTPATIENT)
Dept: LAB | Age: 74
Discharge: HOME | End: 2019-07-16
Payer: MEDICARE

## 2019-07-16 DIAGNOSIS — M25.552 LEFT HIP PAIN: ICD-10-CM

## 2019-07-16 DIAGNOSIS — E78.2 MIXED HYPERCHOLESTEROLEMIA AND HYPERTRIGLYCERIDEMIA: ICD-10-CM

## 2019-07-16 DIAGNOSIS — R05.9 COUGH: ICD-10-CM

## 2019-07-16 DIAGNOSIS — Z00.00 ROUTINE GENERAL MEDICAL EXAMINATION AT A HEALTH CARE FACILITY: ICD-10-CM

## 2019-07-16 DIAGNOSIS — R53.83 FATIGUE, UNSPECIFIED TYPE: ICD-10-CM

## 2019-07-16 LAB
ALBUMIN SERPL-MCNC: 3.4 G/DL (ref 3.4–5)
ALBUMIN/GLOB SERPL: 1.4 {RATIO} (ref 0.8–1.7)
ALP SERPL-CCNC: 60 U/L (ref 45–117)
ALT SERPL-CCNC: 20 U/L (ref 16–61)
ANION GAP SERPL CALC-SCNC: 7 MMOL/L (ref 3–18)
AST SERPL-CCNC: 11 U/L (ref 15–37)
BILIRUB SERPL-MCNC: 0.5 MG/DL (ref 0.2–1)
BUN SERPL-MCNC: 22 MG/DL (ref 7–18)
BUN/CREAT SERPL: 17 (ref 12–20)
CALCIUM SERPL-MCNC: 7.9 MG/DL (ref 8.5–10.1)
CHLORIDE SERPL-SCNC: 108 MMOL/L (ref 100–108)
CHOLEST SERPL-MCNC: 164 MG/DL
CO2 SERPL-SCNC: 27 MMOL/L (ref 21–32)
CREAT SERPL-MCNC: 1.26 MG/DL (ref 0.6–1.3)
ERYTHROCYTE [DISTWIDTH] IN BLOOD BY AUTOMATED COUNT: 12.9 % (ref 11.6–14.5)
GLOBULIN SER CALC-MCNC: 2.4 G/DL (ref 2–4)
GLUCOSE SERPL-MCNC: 100 MG/DL (ref 74–99)
HCT VFR BLD AUTO: 42.1 % (ref 36–48)
HDLC SERPL-MCNC: 43 MG/DL (ref 40–60)
HDLC SERPL: 3.8 {RATIO} (ref 0–5)
HGB BLD-MCNC: 14.3 G/DL (ref 13–16)
LDLC SERPL CALC-MCNC: 99.6 MG/DL (ref 0–100)
LIPID PROFILE,FLP: NORMAL
MCH RBC QN AUTO: 29.7 PG (ref 24–34)
MCHC RBC AUTO-ENTMCNC: 34 G/DL (ref 31–37)
MCV RBC AUTO: 87.3 FL (ref 74–97)
PLATELET # BLD AUTO: 199 K/UL (ref 135–420)
PMV BLD AUTO: 10.1 FL (ref 9.2–11.8)
POTASSIUM SERPL-SCNC: 3.9 MMOL/L (ref 3.5–5.5)
PROT SERPL-MCNC: 5.8 G/DL (ref 6.4–8.2)
RBC # BLD AUTO: 4.82 M/UL (ref 4.7–5.5)
SODIUM SERPL-SCNC: 142 MMOL/L (ref 136–145)
TRIGL SERPL-MCNC: 107 MG/DL (ref ?–150)
TSH SERPL DL<=0.05 MIU/L-ACNC: 1.57 UIU/ML (ref 0.36–3.74)
VLDLC SERPL CALC-MCNC: 21.4 MG/DL
WBC # BLD AUTO: 6.2 K/UL (ref 4.6–13.2)

## 2019-07-16 PROCEDURE — 86038 ANTINUCLEAR ANTIBODIES: CPT

## 2019-07-16 PROCEDURE — 86431 RHEUMATOID FACTOR QUANT: CPT

## 2019-07-16 PROCEDURE — 85027 COMPLETE CBC AUTOMATED: CPT

## 2019-07-16 PROCEDURE — 80053 COMPREHEN METABOLIC PANEL: CPT

## 2019-07-16 PROCEDURE — 84443 ASSAY THYROID STIM HORMONE: CPT

## 2019-07-16 PROCEDURE — 80061 LIPID PANEL: CPT

## 2019-07-16 PROCEDURE — 86200 CCP ANTIBODY: CPT

## 2019-07-16 PROCEDURE — 36415 COLL VENOUS BLD VENIPUNCTURE: CPT

## 2019-07-17 LAB — CCP IGA+IGG SERPL IA-ACNC: 3 UNITS (ref 0–19)

## 2019-07-18 LAB
ANA TITR SER IF: NEGATIVE {TITER}
PLEASE NOTE, 734348: NORMAL

## 2019-07-30 LAB — CANNABINOIDS BLD CFM-MCNC: 1.8 IU/ML (ref 0–15)

## 2019-11-13 ENCOUNTER — HOSPITAL ENCOUNTER (OUTPATIENT)
Dept: LAB | Age: 74
Discharge: HOME | End: 2019-11-13
Payer: MEDICARE

## 2019-11-13 ENCOUNTER — OFFICE VISIT (OUTPATIENT)
Dept: FAMILY MEDICINE CLINIC | Age: 74
End: 2019-11-13

## 2019-11-13 VITALS
BODY MASS INDEX: 27.81 KG/M2 | HEART RATE: 62 BPM | OXYGEN SATURATION: 96 % | WEIGHT: 177.2 LBS | DIASTOLIC BLOOD PRESSURE: 67 MMHG | TEMPERATURE: 97 F | HEIGHT: 67 IN | SYSTOLIC BLOOD PRESSURE: 126 MMHG | RESPIRATION RATE: 17 BRPM

## 2019-11-13 DIAGNOSIS — Z23 ENCOUNTER FOR IMMUNIZATION: ICD-10-CM

## 2019-11-13 DIAGNOSIS — R09.82 PND (POST-NASAL DRIP): Primary | ICD-10-CM

## 2019-11-13 DIAGNOSIS — R35.0 URINARY FREQUENCY: ICD-10-CM

## 2019-11-13 PROCEDURE — 87086 URINE CULTURE/COLONY COUNT: CPT

## 2019-11-13 RX ORDER — ZEAXANTHIN 90 %
POWDER (GRAM) MISCELLANEOUS
COMMUNITY
End: 2021-12-08 | Stop reason: ALTCHOICE

## 2019-11-13 NOTE — PATIENT INSTRUCTIONS
[facility] Dr. Nathalia Judge [address] [facility],  [address] [place] [address]

## 2019-11-13 NOTE — PROGRESS NOTES
Chief Complaint   Patient presents with    Urinary Frequency       ASSESSMENT/PLAN  1. PND (post-nasal drip)  -referral ent. Encouraged daily use of flonase.   - REFERRAL TO ENT-OTOLARYNGOLOGY    2. Urinary frequency  -send for culture. Pt to make appt with urology. - CULTURE, URINE; Future    3. Encounter for immunization  - INFLUENZA VACCINE INACTIVATED (IIV), SUBUNIT, ADJUVANTED, IM  - ADMIN INFLUENZA VIRUS VAC      Push fluids, may use Pyridium OTC prn. Call or return to clinic prn if these symptoms worsen or fail to improve as anticipated. The plan was discussed with the patient. The patient verbalized understanding and is in agreement with the plan. All medication potential side effects were discussed with the patient. SUBJECTIVE: Jim Donis is a 66 y.o. male who complains of urinary frequency x 6-8 weeks. +nocturia. No urgency and dysuria, without flank pain, fever, chills. He has h/o prostate ca s/p radical prostatectomy. Pt c/o sore throat, and a tickle in his throat. Not consistent with flonase use. States his whole life he's had \"bad sinuses\". He has coughing fits and feels like \"his throat closes\" and he can't breathe. He also has h/o GERD, for which he takes protonix. OBJECTIVE:   Visit Vitals  /67 (BP 1 Location: Left arm, BP Patient Position: Sitting)   Pulse 62   Temp 97 °F (36.1 °C)   Resp 17   Ht 5' 7\" (1.702 m)   Wt 177 lb 3.2 oz (80.4 kg)   SpO2 96%   BMI 27.75 kg/m²       Gen: Appears well, in no apparent distress. CV: RRR. 2/6 NETTA BARTOLO w/o radiation  Pulm: CTA bilaterally. No wheezes/rales/crackles. : No CVA tenderness noted. Urine dipstick shows negative for all components. ENT: no inferior sinus inflammation, +middle ear effusion bilat.          Christopher Murillo MD

## 2019-11-13 NOTE — PROGRESS NOTES
Terrie Martinez is a 66 y.o. male (: 1945) presenting to address:    Chief Complaint   Patient presents with    Urinary Frequency       Vitals:    19 0823   BP: 126/67   Pulse: 62   Resp: 17   Temp: 97 °F (36.1 °C)   SpO2: 96%   Weight: 177 lb 3.2 oz (80.4 kg)   Height: 5' 7\" (1.702 m)   PainSc:   5   PainLoc: Jaw       Hearing/Vision:   No exam data present    Learning Assessment:     Learning Assessment 2016   PRIMARY LEARNER Patient   HIGHEST LEVEL OF EDUCATION - PRIMARY LEARNER  SOME COLLEGE   LEARNER PREFERENCE PRIMARY DEMONSTRATION     Depression Screening:     3 most recent PHQ Screens 7/15/2019   Little interest or pleasure in doing things Not at all   Feeling down, depressed, irritable, or hopeless Not at all   Total Score PHQ 2 0     Fall Risk Assessment:     Fall Risk Assessment, last 12 mths 2019   Able to walk? Yes   Fall in past 12 months? No     Abuse Screening:     Abuse Screening Questionnaire 7/15/2019   Do you ever feel afraid of your partner? N   Are you in a relationship with someone who physically or mentally threatens you? N   Is it safe for you to go home? Y     Coordination of Care Questionaire:   1. Have you been to the ER, urgent care clinic since your last visit? Hospitalized since your last visit? No     2. Have you seen or consulted any other health care providers outside of the [address] [place] [address] since your last visit? Include any pap smears or colon screening. No   Advanced Directive:   1. Do you have an Advanced Directive? No   2. Would you like information on Advanced Directives? No     Health Maintenance Due   Topic Date Due    Influenza Age 5 to Adult  2019    Shingrix Vaccine Age 50> (2 of 2) 2019     Immunization/s administered 2019 by Latoya Evans LPN with guardian's consent. Patient tolerated procedure well. No reactions noted.     fluad right deltoid

## 2019-11-15 LAB
BACTERIA SPEC CULT: NORMAL
SERVICE CMNT-IMP: NORMAL

## 2019-12-11 ENCOUNTER — OFFICE VISIT (OUTPATIENT)
Dept: FAMILY MEDICINE CLINIC | Age: 74
End: 2019-12-11

## 2019-12-11 VITALS
DIASTOLIC BLOOD PRESSURE: 73 MMHG | RESPIRATION RATE: 18 BRPM | OXYGEN SATURATION: 98 % | HEART RATE: 69 BPM | WEIGHT: 175 LBS | BODY MASS INDEX: 27.47 KG/M2 | HEIGHT: 67 IN | TEMPERATURE: 98 F | SYSTOLIC BLOOD PRESSURE: 126 MMHG

## 2019-12-11 DIAGNOSIS — J01.00 ACUTE NON-RECURRENT MAXILLARY SINUSITIS: ICD-10-CM

## 2019-12-11 DIAGNOSIS — R05.9 COUGH: Primary | ICD-10-CM

## 2019-12-11 RX ORDER — CODEINE PHOSPHATE AND GUAIFENESIN 10; 100 MG/5ML; MG/5ML
5 SOLUTION ORAL
Qty: 105 ML | Refills: 0 | Status: SHIPPED | OUTPATIENT
Start: 2019-12-11 | End: 2019-12-18

## 2019-12-11 RX ORDER — AZITHROMYCIN 250 MG/1
TABLET, FILM COATED ORAL
Qty: 6 TAB | Refills: 0 | Status: SHIPPED | OUTPATIENT
Start: 2019-12-11 | End: 2019-12-16

## 2019-12-11 NOTE — PROGRESS NOTES
Chief Complaint   Patient presents with    Cough    Nasal Congestion    Joint Pain       Assessment/Plan  1. Cough  -zpack.   - guaiFENesin-codeine (ROBITUSSIN AC) 100-10 mg/5 mL solution; Take 5 mL by mouth three (3) times daily as needed for Cough for up to 7 days. Max Daily Amount: 15 mL. Dispense: 105 mL; Refill: 0  - azithromycin (ZITHROMAX) 250 mg tablet; Take two tablets today then one tablet daily  Dispense: 6 Tab; Refill: 0    2. Acute non-recurrent maxillary sinusitis  - azithromycin (ZITHROMAX) 250 mg tablet; Take two tablets today then one tablet daily  Dispense: 6 Tab; Refill: 0      The plan was discussed with the patient. The patient verbalized understanding and is in agreement with the plan. All medication potential side effects were discussed with the patient. SUBJECTIVE:   William Myers is a 66 y.o. male who complains of nonproductive cough x several months. About 5 days ago, got worsening nasal congestion. Mild maxillary sinus pain. He feels his throat closes up. He thinks his allergies are contributing and has appt with allergist next week. Review of Systems - ENT ROS: positive for - headaches, nasal congestion, sinus pain and sore throat  Respiratory ROS: no cough, shortness of breath, or wheezing  Cardiovascular ROS: no chest pain or dyspnea on exertion  Gastrointestinal ROS: no abdominal pain, change in bowel habits, or black or bloody stools  Musculoskeletal ROS: negative  Neurological ROS: negative    Physical Examination:   Visit Vitals  /73 (BP 1 Location: Right arm, BP Patient Position: Sitting)   Pulse 69   Temp 98 °F (36.7 °C) (Oral)   Resp 18   Ht 5' 7\" (1.702 m)   Wt 175 lb (79.4 kg)   SpO2 98%   BMI 27.41 kg/m²       Constitutional: Well developed, nourished, no distress, alert, nontoxic   HENT: Exterior ears and tympanic membranes normal bilaterally. Supple neck. No thyromegaly or lymphadenopathy. Oropharynx clear and moist mucous membranes.   +bilat middle ear effusion. Eyes: Conjunctiva normal. PERRL. CV: S1, S2.  RRR. No murmurs/rubs. Pulm: No abnormalities on inspection. Clear to auscultation bilaterally. No wheezing/rhonchi. Mildly increased effort but able to speak in full sentences.              Farzad Charles MD

## 2019-12-11 NOTE — PROGRESS NOTES
Will Farias is a 66 y.o. male (: 1945) presenting to address:    Chief Complaint   Patient presents with    Cough    Nasal Congestion    Joint Pain       Vitals:    19 1052   BP: 126/73   Pulse: 69   Resp: 18   Temp: 98 °F (36.7 °C)   TempSrc: Oral   SpO2: 98%   Weight: 175 lb (79.4 kg)   Height: 5' 7\" (1.702 m)   PainSc:   4   PainLoc: Abdomen       Hearing/Vision:   No exam data present    Learning Assessment:     Learning Assessment 2016   PRIMARY LEARNER Patient   HIGHEST LEVEL OF EDUCATION - PRIMARY LEARNER  SOME COLLEGE   LEARNER PREFERENCE PRIMARY DEMONSTRATION     Depression Screening:     3 most recent PHQ Screens 7/15/2019   Little interest or pleasure in doing things Not at all   Feeling down, depressed, irritable, or hopeless Not at all   Total Score PHQ 2 0     Fall Risk Assessment:     Fall Risk Assessment, last 12 mths 2019   Able to walk? Yes   Fall in past 12 months? No     Abuse Screening:     Abuse Screening Questionnaire 7/15/2019   Do you ever feel afraid of your partner? N   Are you in a relationship with someone who physically or mentally threatens you? N   Is it safe for you to go home? Y     Coordination of Care Questionaire:   1. Have you been to the ER, urgent care clinic since your last visit? Hospitalized since your last visit? NO    2. Have you seen or consulted any other health care providers outside of the [address] [place] [address] since your last visit? Include any pap smears or colon screening. YES    Advanced Directive:   1. Do you have an Advanced Directive? NO    2. Would you like information on Advanced Directives?  NO      Patient had 2nd shingrix and Pnemo

## 2020-02-11 ENCOUNTER — TELEPHONE (OUTPATIENT)
Dept: FAMILY MEDICINE CLINIC | Age: 75
End: 2020-02-11

## 2020-02-11 NOTE — TELEPHONE ENCOUNTER
Patient would like to make sure that his my chart message is reviewed. I did explain to him that Dr Adalberto Harry is out of the office until Friday and that I would ask the nurse to review the message to see if anything can be done in the mean time.

## 2020-02-11 NOTE — TELEPHONE ENCOUNTER
Called pt. He is going to request his recent CT from [place] be sent to pcp for review. Pt would like to know if he does need a visit before a PET scan can be ordered.

## 2020-02-14 DIAGNOSIS — K63.89 MESENTERIC MASS: Primary | ICD-10-CM

## 2020-02-14 DIAGNOSIS — D37.1 NEOPLASM OF UNCERTAIN BEHAVIOR OF STOMACH, INTESTINES, AND RECTUM: ICD-10-CM

## 2020-02-14 DIAGNOSIS — D37.8 NEOPLASM OF UNCERTAIN BEHAVIOR OF STOMACH, INTESTINES, AND RECTUM: ICD-10-CM

## 2020-02-14 DIAGNOSIS — D37.5 NEOPLASM OF UNCERTAIN BEHAVIOR OF STOMACH, INTESTINES, AND RECTUM: ICD-10-CM

## 2020-02-14 NOTE — TELEPHONE ENCOUNTER
I told him to sign a release of medical info form at  or call the [place] doctor directly to fax it to us.

## 2020-02-17 ENCOUNTER — TELEPHONE (OUTPATIENT)
Dept: FAMILY MEDICINE CLINIC | Age: 75
End: 2020-02-17

## 2020-02-17 NOTE — TELEPHONE ENCOUNTER
Doron Maravilla called stating that the order that was placed for the patient needs a \"dictated office note from the physician\" for the authorization for the patient's insurance. They want to see why the CT is being ordered.  She also stated that the the order was ordered as a \"PET CT-limited\" but needed to be ordered as a \"PET CT-skull base to mid thigh (code: [id number])\"

## 2020-02-26 ENCOUNTER — DOCUMENTATION ONLY (OUTPATIENT)
Dept: FAMILY MEDICINE CLINIC | Age: 75
End: 2020-02-26

## 2020-02-26 ENCOUNTER — OFFICE VISIT (OUTPATIENT)
Dept: FAMILY MEDICINE CLINIC | Age: 75
End: 2020-02-26

## 2020-02-26 VITALS
WEIGHT: 168 LBS | HEART RATE: 58 BPM | RESPIRATION RATE: 18 BRPM | HEIGHT: 67 IN | OXYGEN SATURATION: 96 % | SYSTOLIC BLOOD PRESSURE: 134 MMHG | TEMPERATURE: 97.9 F | DIASTOLIC BLOOD PRESSURE: 80 MMHG | BODY MASS INDEX: 26.37 KG/M2

## 2020-02-26 DIAGNOSIS — M54.16 LUMBAR RADICULAR PAIN: ICD-10-CM

## 2020-02-26 DIAGNOSIS — R06.00 DYSPNEA, UNSPECIFIED TYPE: ICD-10-CM

## 2020-02-26 DIAGNOSIS — D17.9 ANGIOMYOLIPOMA: ICD-10-CM

## 2020-02-26 DIAGNOSIS — K63.89 MESENTERIC MASS: Primary | ICD-10-CM

## 2020-02-26 DIAGNOSIS — M54.12 CERVICAL RADICULOPATHY: ICD-10-CM

## 2020-02-26 DIAGNOSIS — Z85.46 HISTORY OF PROSTATE CANCER: ICD-10-CM

## 2020-02-26 DIAGNOSIS — M53.82 IMPAIRED RANGE OF MOTION OF CERVICAL SPINE: ICD-10-CM

## 2020-02-26 RX ORDER — FLUTICASONE PROPIONATE AND SALMETEROL 250; 50 UG/1; UG/1
1 POWDER RESPIRATORY (INHALATION) EVERY 12 HOURS
Qty: 1 INHALER | Refills: 0 | Status: SHIPPED | OUTPATIENT
Start: 2020-02-26 | End: 2020-02-27 | Stop reason: CLARIF

## 2020-02-26 NOTE — PROGRESS NOTES
Started prior [facility] for [employer]. Pt's insurance prefers brand Advair. Prior auth still pending.

## 2020-02-26 NOTE — PROGRESS NOTES
Assessment/Plan:    1. Mesenteric mass  -will do PET given h/o prostate ca and new mass that could represent neoplastic process. 2. Angiomyolipoma  -stable, small. 3. Cervical radiculopathy and decr ROM neck - referral PT. 4. Dyspnea - pfts showed fev/fvc 77%. Try advair. The plan was discussed with the patient. The patient verbalized understanding and is in agreement with the plan. All medication potential side effects were discussed with the patient. Health Maintenance:   Health Maintenance   Topic Date Due    Shingrix Vaccine Age 49> (2 of 2) 09/08/2019    Medicare Yearly Exam  07/15/2020    Pneumococcal 65+ years (2 of 2 - PPSV23) 07/15/2020    Lipid Screen  07/16/2020    GLAUCOMA SCREENING Q2Y  02/01/2021    Colonoscopy  02/08/2024    DTaP/Tdap/Td series (4 - Td) 04/28/2029    Influenza Age 9 to Adult  Completed       Larry Richey is a 66 y.o. male and presents with Numbness (in Left arm with blurred vision) and Hip Pain     Subjective:  Pt was seen by a doctor in for abd pain in [place]. CT abd done 1/24 which showed a new irregular mesenteric lesion 2.1 x 1.7cm with adjacent smaller nodules. futher eval with PET was recommended. He has a h/o prostate ca, s/p prostatectomy. PSA undetectable 11/2019. [place] is up to date. He c/o L arm parethesias x 1 week. Has h/o cervical arthropathy and previously had NS recommended surgery. No neck pain. Has decr ROM in neck. No weakness in L arm. ROS:  Constitutional: No recent weight change. No weakness/fatigue. No f/c. Cardiovascular: No CP/palpitations. No BURGER/orthopnea/PND. Respiratory: No cough/sputum, dyspnea, wheezing. Gastointestinal: No dysphagia, reflux. No n/v. No constipation/diarrhea. No melena/rectal bleeding. Genitourinary: No dysuria, urinary hesitancy, nocturia, hematuria. No incontinence. The problem list was updated as a part of today's visit.   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 SH:  Social History     Tobacco Use    Smoking status: Never Smoker    Smokeless tobacco: Never Used   Substance Use Topics    Alcohol use: No    Drug use: No       Medications/Allergies:  Current Outpatient Medications on File Prior to Visit   Medication Sig Dispense Refill    amLODIPine (NORVASC) 5 mg tablet       oxybutynin chloride XL (DITROPAN XL) 5 mg CR tablet Take 5 mg by mouth daily.  tolterodine ER (DETROL LA) 4 mg ER capsule Take 1 Cap by mouth daily. 90 Cap 3    vit B Cmplx 3-FA-Vit C-Biotin (NEPHRO SHREYAS RX) 1- mg-mg-mcg tablet Take 1 Tab by mouth daily.  zeaxanthin, bulk, 90 % powd by Does Not Apply route.  atorvastatin (LIPITOR) 20 mg tablet Take  by mouth daily.  co-enzyme Q-10 (COQ-10) 100 mg capsule Take 100 mg by mouth daily.  ginseng 100 mg cap Take  by mouth.  lutein 40 mg cap Take 40 mg by mouth daily.  glucosam/nafisa-msm1/C/shaun/bosw (OSTEO BI-FLEX TRIPLE STRENGTH PO) Take  by mouth.  zinc 50 mg tab tablet Take  by mouth daily.  turmeric root extract 1,053 mg tab Take 1,500 mg by mouth.  alendronate (FOSAMAX) 70 mg tablet Take 70 mg by mouth every seven (7) days.       levothyroxine (SYNTHROID) 50 mcg tablet Take 100 mcg by mouth Daily (before breakfast).  FLUTICASONE PROPIONATE (FLONASE NA) by Nasal route.  pantoprazole (PROTONIX) 40 mg tablet Take 1 Tab by mouth daily. 90 Tab 3    aspirin delayed-release 81 mg tablet Take  by mouth daily.  BILBERRY PO Take  by mouth.  garlic 1,264 mg cap Take  by mouth.  cisco, Zingiber officinalis, 250 mg cap Take  by mouth.  ginkgo biloba 60 mg cap Take  by mouth.  krill oil 500 mg cap Take  by mouth.  multivitamin (ONE A DAY) tablet Take 1 Tab by mouth daily.  ascorbic acid, vitamin C, (VITAMIN C) 500 mg tablet Take  by mouth.  vitamin E (AQUA GEMS) 400 unit capsule Take  by mouth daily. No current facility-administered medications on file prior to visit. Allergies   Allergen Reactions    Amlodipine Besylate Other (comments)     Edema, DIZZINESS (per patient not dizziness but severe cough)       Objective:  Visit Vitals  /80 (BP 1 Location: Left arm, BP Patient Position: Sitting)   Pulse (!) 58   Temp 97.9 °F (36.6 °C) (Oral)   Resp 18   Ht 5' 7\" (1.702 m)   Wt 168 lb (76.2 kg)   SpO2 96%   BMI 26.31 kg/m²      Constitutional: Well developed, nourished, no distress, alert   CV: S1, S2.  RRR. No murmurs/rubs. No edema. Pulm: No abnormalities on inspection. Clear to auscultation bilaterally. No wheezing/rhonchi. Normal effort. GI: Soft, nontender, nondistended. Normal active bowel sounds.

## 2020-02-26 NOTE — PROGRESS NOTES
Yoni Jurado is a 66 y.o. male (: 1945) presenting to address:    Chief Complaint   Patient presents with    Numbness     in Left arm with blurred vision    Hip Pain       Vitals:    20 0901   BP: 134/80   Pulse: (!) 58   Resp: 18   Temp: 97.9 °F (36.6 °C)   TempSrc: Oral   SpO2: 96%   Weight: 168 lb (76.2 kg)   Height: 5' 7\" (1.702 m)       Hearing/Vision:   No exam data present    Learning Assessment:     Learning Assessment 2016   PRIMARY LEARNER Patient   HIGHEST LEVEL OF EDUCATION - PRIMARY LEARNER  SOME COLLEGE   LEARNER PREFERENCE PRIMARY DEMONSTRATION     Depression Screening:     3 most recent PHQ Screens 2020   Little interest or pleasure in doing things Not at all   Feeling down, depressed, irritable, or hopeless Not at all   Total Score PHQ 2 0     Fall Risk Assessment:     Fall Risk Assessment, last 12 mths 2020   Able to walk? Yes   Fall in past 12 months? No     Abuse Screening:     Abuse Screening Questionnaire 7/15/2019   Do you ever feel afraid of your partner? N   Are you in a relationship with someone who physically or mentally threatens you? N   Is it safe for you to go home? Y     Coordination of Care Questionaire:   1. Have you been to the ER, urgent care clinic since your last visit? Hospitalized since your last visit? NO    2. Have you seen or consulted any other health care providers outside of the [address] [place] [address] since your last visit? Include any pap smears or colon screening. YES, ENT [place]    Advanced Directive:   1. Do you have an Advanced Directive? NO    2. Would you like information on Advanced Directives?  NO

## 2020-02-27 RX ORDER — FLUTICASONE PROPIONATE AND SALMETEROL 250; 50 UG/1; UG/1
1 POWDER RESPIRATORY (INHALATION) 2 TIMES DAILY
Qty: 1 INHALER | Refills: 12 | OUTPATIENT
Start: 2020-02-27 | End: 2021-05-19 | Stop reason: ALTCHOICE

## 2020-03-02 DIAGNOSIS — Z85.49 PERSONAL HISTORY OF MALIGNANT NEOPLASM OF OTHER MALE GENITAL ORGANS: ICD-10-CM

## 2020-03-02 DIAGNOSIS — K63.89 MESENTERIC MASS: Primary | ICD-10-CM

## 2020-03-02 DIAGNOSIS — Z85.45: ICD-10-CM

## 2020-03-02 DIAGNOSIS — Z85.46 HISTORY OF PROSTATE CANCER: ICD-10-CM

## 2020-03-09 ENCOUNTER — HOSPITAL ENCOUNTER (OUTPATIENT)
Dept: PHYSICAL THERAPY | Age: 75
Discharge: HOME | End: 2020-03-09
Payer: MEDICARE

## 2020-03-09 PROCEDURE — 97162 PT EVAL MOD COMPLEX 30 MIN: CPT

## 2020-03-09 PROCEDURE — 97530 THERAPEUTIC ACTIVITIES: CPT

## 2020-03-09 NOTE — PROGRESS NOTES
[address] [place] [address] PHYSICAL THERAPY  [address], [address],[facility], [facility] - Phone: [phone]  Fax: [phone] / [facility]  Patient Name: Joesph Millan : 1945   Medical   Diagnosis: Low back pain [M54.5] Treatment Diagnosis: Low back pain [M54.5]   Onset Date: 1-2 months     Referral Source: Margarito Pablo MD [facility]): 3/9/2020   Prior Hospitalization: See medical history Provider #: [id number]   Prior Level of Function: Painfree hiking and sleeping, Pain free participation in board games and fitting   Comorbidities: H/O Prostate Cancer (), HTN, L4/5 L/S Surgery, Thyroid Problems   Medications: Verified on Patient Summary List   The Plan of Care and following information is based on the information from the initial evaluation.   ===========================================================================================  Assessment / key information:  Patient is a 66 y.o. male who presents to [facility] at [facility]. with diagnosis of Low back pain [M54.5]. Patient reports chronic h/o neck pain with progressive worsening 1-2 months ago with insidious onset. H/O of similar episode 25 years ago which serving in the [employer]. In addition, patient reports left hip pain with lying in left sidelying which began 1-2 months ago. X-Ray imaging of C/S revealed \"bony growths\". C/S pain is described as intermittent and located on the left side of the neck and radiates to the left shoulder. Patient notes intermittent numbness and tingling in the left UE and radiates to the elbow. Pain is rated as a 0/10 at the best, 0/10 currently, and 7/10 (shoulder/hip) at the worst. Left hip pain increases with sleeping in left sidelying, floor to standing transfers, and prolonged hiking (at 2 miles).  C/S pain increases with turning the head to back up car, prolonged sitting, playing cards, and reaching. Upon objective evaluation patient demonstrates impaired and painful C/S AROM in extension and MAJO sidebending and L rotation (flexion 50, extension 33, R/L SB 20/10, R/L Rot 75/47), postural deviations of cervical protrusion and rounded shoulders, grossly impaired hip strength (Glute Med 3+/5, Glute Max 2+/5),decreased left shoulder RTC strength (scaption 4+/5, flexion 4+/5, and ER 4+/5), impaired L UE C4, C7, and T1/2 light touch sensation, impaired flexibility of left UT/levator scapula and L hamstring, and TTP to left UT, TFL, and piriformis. The following special tests were positive: L Spurling's was positive indicating possible neural tension. Patient scored 62/100 on FOTO indicating decreased function and quality of life. Patient can benefit from PT interventions to improve C/S AROM, cervical flexor strength, flexibility, joint mobility, decrease C/S pain, tone, and TTP to facilitate ADL's & overall functional status. Shoulder  MMT   R/L Sh Flexion 5/4+   R/L Sh Scaption 5/4+   R/L Sh FIR 5/5-   R/L SH ER @ 0 deg ABD 5/4+     Hip Joint Left  Strength Right  Strength   Flexion 5 5   Extension 2+ 2+   Abduction 3+ 4     ===========================================================================================  Eval Complexity: History HIGH Complexity :3+ comorbidities / personal factors will impact the outcome/ POC ;  Examination  HIGH Complexity : 4+ Standardized tests and measures addressing body structure, function, activity limitation and / or participation in recreation ; Presentation MEDIUM Complexity : Evolving with changing characteristics ;   Decision Making MEDIUM Complexity : FOTO score of 26-74; Overall Complexity MEDIUM  Problem List: pain affecting function, decrease ROM, decrease strength, impaired gait/ balance, decrease ADL/ functional abilities, decrease activity tolerance, decrease flexibility/ joint mobility and decrease transfer abilities   Treatment Plan may include any combination of the following: Therapeutic exercise, Therapeutic activities, Neuromuscular re-education, Physical agent/modality, Gait/balance training, Manual therapy, Patient education, Self Care training, Functional mobility training, Home safety training and Stair training  Patient / Family readiness to learn indicated by: asking questions, trying to perform skills and interest  Persons(s) to be included in education: patient (P)  Barriers to Learning/Limitations: None  Measures taken, if barriers to learning:    Patient Goal (s): \"mobility\"   Patient self reported health status: good  Rehabilitation Potential: good   Short Term Goals: To be accomplished in  2  weeks:  1) Establish HEP. 2) Patient will report 25% improvement in left UE radicular symptoms with playing card games.  Long Term Goals: To be accomplished in  6  weeks:  1) Patient to be independent with HEP. 2) Patient will report decreased c/o pain to < or = 6/10 to facilitate improved sleeping tolerance with manageable sx in left hip.  3) Improve C/S AROM in L SB to >/=50% of normal to facilitate ADL's such as driving. 4) Patient will increase left hip strength in ABD to 4-/5 so patient has improved ability to tolerate prolonged amblation. Frequency / Duration:   Patient to be seen  2  times per week for 6  weeks:  Patient / Caregiver education and instruction: self care, activity modification and exercises  Therapist Signature: Margarita Ibarra Date: 3/6/8899   Certification Period: 3/9/2020 to 6/8/2020 Time: 12:11 PM   ===========================================================================================  I certify that the above Physical Therapy Services are being furnished while the patient is under my care. I agree with the treatment plan and certify that this therapy is necessary.     Physician Signature:        Date:       Time:     Please sign and return to [facility] at [facility], [facility]. or you may fax the signed copy to [phone]. Thank you.

## 2020-03-09 NOTE — PROGRESS NOTES
PHYSICAL THERAPY - DAILY TREATMENT NOTE    Patient Name: Dakotah Bethea        Date: 3/9/2020  : 1945   Yes Patient  Verified  Visit #:     Insurance: Payor: Kaila Garrison / Plan: [place] MED[place] PART [place] & B / Product Type: Medicare /      In time: 12:07 Out time: 12:59   Total Treatment Time: 52     Medicare/BCBS Time Tracking (below)   Total Timed Codes (min):  17 1:1 Treatment Time:  17     TREATMENT AREA =  Low back pain [M54.5]    SUBJECTIVE  Pain Level (on 0 to 10 scale):  0  / 10   Medication Changes/New allergies or changes in medical history, any new surgeries or procedures?    no  If yes, update Summary List   Subjective Functional Status/Changes:  []  No changes reported     See POC         OBJECTIVE    17 min Therapeutic Activity: Education in anatomy and physiology of the cervical spine. Review of common causes of neck pain - postural stressors prolonged sitting in slouched position, general poor posture, prolonged computer work, and driving with FHP. Review of centralization and peripheralization principles as well as C/S radicular patterns. Pt educated on sitting and sleeping posture modification to reduce musculoskeletal strain with sitting ADL's. Recommended sitting with lumbar Thalia roll to maintain lordosis in order to prevent rounding of the back and consequently preventing FHP in turn improving posture of the neck. Urged patient to avoid sleeping in prone lying as prolonged sleeping with the neck turned to one side places strain on the ST and surrounding joints. Discussed optimal pillow thickness that fills the natural hollow in the contour of the neck between the neck and shoulder suggested without tilting the head or lifting it up. Suggested feather or down pillow in order to adjust the contents to support the hollow of the neck. Rationale:    Improve positioning of C/S to improve the patients ability to tolerate prolonged static resting positions.      Billed With/As: [x] TE   [] TA   [] Neuro   [] Self Care Patient Education: [x] Review HEP    [x] Progressed/Changed HEP based on:   [x] positioning   [x] body mechanics   [] transfers   [] heat/ice application    [] other:      Other Objective/Functional Measures:    See POC     Post Treatment Pain Level (on 0 to 10) scale:   3  L Shoulder  / 10     ASSESSMENT  Assessment/Changes in Function:     See POC     []  See Progress Note/Recertification   Patient will continue to benefit from skilled PT services to modify and progress therapeutic interventions, address functional mobility deficits, address ROM deficits, address strength deficits, analyze and address soft tissue restrictions, analyze and cue movement patterns, analyze and modify body mechanics/ergonomics, assess and modify postural abnormalities and instruct in home and community integration to attain remaining goals.    Progress toward goals / Updated goals:    See newly established goals in POC     PLAN  [x]  Upgrade activities as tolerated yes Continue plan of care   []  Discharge due to :    []  Other:      Therapist: Pipe Burgess    Date: 3/9/2020 Time: 10:54 AM     Future Appointments   Date Time Provider Lisset Chawla   3/9/2020 12:00 PM Glendy JUAREZ [facility]   2/24/2021  1:00 PM Sally Barker MD [facility]

## 2020-03-10 NOTE — PROGRESS NOTES
PHYSICAL THERAPY - DAILY TREATMENT NOTE    Patient Name: Phill Sharpe        Date: 3/11/2020  : 1945   yes Patient  Verified  Visit #:      12  Insurance: Payor: Ira Hobbs / Plan: [place] MEDICARE PART A & B / Product Type: Medicare /      In time: 11:33 Out time: 12:25   Total Treatment Time: 52     Medicare/BCBS Time Tracking (below)   Total Timed Codes (min):  52 1:1 Treatment Time:  37     TREATMENT AREA =  Low back pain [M54.5]    SUBJECTIVE  Pain Level (on 0 to 10 scale):  0  / 10 L forearm N/T   Medication Changes/New allergies or changes in medical history, any new surgeries or procedures?    no  If yes, update Summary List   Subjective Functional Status/Changes:  []  No changes reported     I did order the roll, left arm tingling         OBJECTIVE    42 (bill 27) min Therapeutic Exercise:  [x]  See flow sheet   Rationale:      increase ROM and increase strength to improve the patients ability to perform overhead activity     10 min Manual Therapy: STM to L TFL and glute med and rollator to ITB in R S/L, Supine STM to L>R UT/levator scap and sub occipitals   Rationale:      decrease pain, increase ROM, increase tissue extensibility, decrease trigger points and increase postural awareness to improve patient's ability to perform sitting ADLs    Billed With/As:   [x] TE   [] TA   [] Neuro   [] Self Care Patient Education: [x] Review HEP    [x] Progressed/Changed HEP based on:   [] positioning   [x] body mechanics   [] transfers   [] heat/ice application    [] other:      Other Objective/Functional Measures:    C/S & UE MMT/AROM Pre Tx Post RRIS with OP   Pain location/sx L forearm tingling Improvement in L forearm tingling    C/S Forward flexion  85% NT   C/S Extension  50% 65   C/S R/L SB 25%/25% NT/40%   C/S R/L Rotation 75/50% NT/65     Post RRIS no change in L UE sx and no incr in L rot AROM  Cuing for proper form with cervical retraction with OP and to reach end range for max benefit  Incr mm tone and tenderness to L UT and glute med/distal ITB     Post Treatment Pain Level (on 0 to 10) scale:   3 SH  / 10     ASSESSMENT  Assessment/Changes in Function:     RRIS with OP abolished L UE radicular sx (forearm) and brought sx into the left side of the neck. Updated and issued HEP with guidelines to DC if RRIS with OP causes incr pain that radiates further away from the neck or worsens/produces N/T     []  See Progress Note/Recertification   Patient will continue to benefit from skilled PT services to modify and progress therapeutic interventions, address functional mobility deficits, address ROM deficits, address strength deficits, analyze and address soft tissue restrictions, analyze and cue movement patterns, analyze and modify body mechanics/ergonomics, assess and modify postural abnormalities and instruct in home and community integration to attain remaining goals. Progress toward goals / Updated goals:    First visit after initial evaluation. Progress tx per POC.         PLAN  [x]  Upgrade activities as tolerated yes Continue plan of care   []  Discharge due to :    []  Other:      Therapist: Shelby Stinson    Date: 3/11/2020 Time: 4:41 PM     Future Appointments   Date Time Provider Lisset Chawla   [facility]   3/18/2020 11:30 AM [facility]   3/23/2020 11:30 AM [facility]   3/25/2020 11:30 AM [facility]   3/30/2020 11:30 AM [facility]   4/1/2020 11:30 AM [facility]   4/6/2020 11:30 AM [facility]   4/8/2020 11:30 AM [facility]   2/24/2021  1:00 PM Raul Taylor MD [facility]

## 2020-03-11 ENCOUNTER — HOSPITAL ENCOUNTER (OUTPATIENT)
Dept: PHYSICAL THERAPY | Age: 75
Discharge: HOME | End: 2020-03-11
Payer: MEDICARE

## 2020-03-11 PROCEDURE — 97110 THERAPEUTIC EXERCISES: CPT

## 2020-03-11 PROCEDURE — 97140 MANUAL THERAPY 1/> REGIONS: CPT

## 2020-03-16 ENCOUNTER — HOSPITAL ENCOUNTER (OUTPATIENT)
Dept: PHYSICAL THERAPY | Age: 75
Discharge: HOME | End: 2020-03-16
Payer: MEDICARE

## 2020-03-16 PROCEDURE — 97140 MANUAL THERAPY 1/> REGIONS: CPT

## 2020-03-16 PROCEDURE — 97110 THERAPEUTIC EXERCISES: CPT

## 2020-03-16 NOTE — PROGRESS NOTES
PHYSICAL THERAPY - DAILY TREATMENT NOTE    Patient Name: Anaya Barney        Date: 3/16/2020  : 1945   yes Patient  Verified  Visit #:   3   of   12  Insurance: Payor: [address] [place] [address] / Plan: [place] MEDICARE PART A & B / Product Type: Medicare /      In time: 11:35 Out time: 12:29   Total Treatment Time: 54     Medicare/BCBS Time Tracking (below)   Total Timed Codes (min):  54 1:1 Treatment Time:  54     TREATMENT AREA =  Low back pain [M54.5]    SUBJECTIVE  Pain Level (on 0 to 10 scale):  0  / 10    Medication Changes/New allergies or changes in medical history, any new surgeries or procedures?    no  If yes, update Summary List   Subjective Functional Status/Changes:  []  No changes reported     Tiered - up early to get a PET scan. When I was doing the stretch hurt on the knee. I like the lumbar roll. Left posterior buttock pain today. L UE I felt tingling with PET scan. OBJECTIVE    42 min Therapeutic Exercise:  [x]  See flow sheet   Rationale:      increase ROM and increase strength to improve the patients ability to perform overhead activity     12 min Manual Therapy: STM to L TFL and glute med and rollator to ITB in R S/L, Supine STM to L>R UT/levator scap and sub occipitals   Rationale:      decrease pain, increase ROM, increase tissue extensibility, decrease trigger points and increase postural awareness to improve patient's ability to perform sitting ADLs    Billed With/As:   [x] TE   [] TA   [] Neuro   [] Self Care Patient Education: [x] Review HEP    [x] Progressed/Changed HEP based on:   [] positioning   [x] body mechanics   [] transfers   [] heat/ice application    [] other:      Other Objective/Functional Measures:    Improved form with RRIS with OP with tactile cue for cervical head position   RRIS with OP eased L scapular sx. Cuing for proper form with clam and bridging in order to ensure max benefit from strengthening therex.    Presents with moderate mm tone and tenderness to L glute med/distal ITB  Demonstrating decreased L hamstring mm flexibility      Post Treatment Pain Level (on 0 to 10) scale:  0  / 10     ASSESSMENT  Assessment/Changes in Function:     Good response treatment denies pain only reporting mm soreness from disuse post treatment session. []  See Progress Note/Recertification   Patient will continue to benefit from skilled PT services to modify and progress therapeutic interventions, address functional mobility deficits, address ROM deficits, address strength deficits, analyze and address soft tissue restrictions, analyze and cue movement patterns, analyze and modify body mechanics/ergonomics, assess and modify postural abnormalities and instruct in home and community integration to attain remaining goals. Progress toward goals / Updated goals:    First visit after initial evaluation. Progress tx per POC.         PLAN  [x]  Upgrade activities as tolerated yes Continue plan of care   []  Discharge due to :    []  Other:      Therapist: Aniket Patton    Date: 3/16/2020 Time: 4:41 PM     Future Appointments   Date Time Provider Lisset Chawla   3/18/2020 11:30 AM Maria Elena Stair [facility]   3/23/2020 11:30 AM Maria Elena Stair [facility]   3/25/2020 11:30 AM Maria Elena Stair [facility]   3/30/2020 11:30 AM Maria Elena Stair [facility]   4/1/2020 11:30 AM Maria Elena Stair [facility]   4/6/2020 11:30 AM Maria Elena Stair [facility]   4/8/2020 11:30 AM Maria Elena Stair [facility]   2/24/2021  1:00 PM Mary Martins MD [facility]

## 2020-03-18 ENCOUNTER — APPOINTMENT (OUTPATIENT)
Dept: PHYSICAL THERAPY | Age: 75
End: 2020-03-18
Payer: MEDICARE

## 2020-03-20 ENCOUNTER — HOSPITAL ENCOUNTER (OUTPATIENT)
Dept: LAB | Age: 75
Discharge: HOME | End: 2020-03-20
Payer: MEDICARE

## 2020-03-20 ENCOUNTER — OFFICE VISIT (OUTPATIENT)
Dept: FAMILY MEDICINE CLINIC | Age: 75
End: 2020-03-20

## 2020-03-20 VITALS
WEIGHT: 180 LBS | RESPIRATION RATE: 20 BRPM | OXYGEN SATURATION: 95 % | HEIGHT: 67 IN | HEART RATE: 62 BPM | BODY MASS INDEX: 28.25 KG/M2 | TEMPERATURE: 97.7 F | DIASTOLIC BLOOD PRESSURE: 70 MMHG | SYSTOLIC BLOOD PRESSURE: 124 MMHG

## 2020-03-20 DIAGNOSIS — Z79.899 HIGH RISK MEDICATION USE: ICD-10-CM

## 2020-03-20 DIAGNOSIS — E04.1 THYROID NODULE: ICD-10-CM

## 2020-03-20 DIAGNOSIS — M54.16 LUMBAR RADICULAR PAIN: Primary | ICD-10-CM

## 2020-03-20 DIAGNOSIS — R05.3 CHRONIC COUGH: ICD-10-CM

## 2020-03-20 DIAGNOSIS — Z85.46 HISTORY OF PROSTATE CANCER: ICD-10-CM

## 2020-03-20 DIAGNOSIS — K63.89 MESENTERIC MASS: ICD-10-CM

## 2020-03-20 PROCEDURE — 36415 COLL VENOUS BLD VENIPUNCTURE: CPT

## 2020-03-20 PROCEDURE — G0480 DRUG TEST DEF 1-7 CLASSES: HCPCS

## 2020-03-20 RX ORDER — TRAMADOL HYDROCHLORIDE 50 MG/1
50 TABLET ORAL
Qty: 30 TAB | Refills: 0 | Status: SHIPPED | OUTPATIENT
Start: 2020-03-20 | End: 2020-06-03 | Stop reason: SDUPTHER

## 2020-03-20 RX ORDER — NAPROXEN 500 MG/1
500 TABLET ORAL
Qty: 180 TAB | Refills: 0 | Status: SHIPPED | OUTPATIENT
Start: 2020-03-20 | End: 2020-06-10 | Stop reason: ALTCHOICE

## 2020-03-20 NOTE — PROGRESS NOTES
Vishnu Gentile is a 66 y.o. male (: 1945) presenting to address:    Chief Complaint   Patient presents with    Hip Pain     left       Vitals:    20 1056   BP: 124/70   Pulse: 62   SpO2: 95%   Weight: 170 lb (77.1 kg)   Height: 5' 7\" (1.702 m)   PainSc:   3       Hearing/Vision:   No exam data present    Learning Assessment:     Learning Assessment 2016   PRIMARY LEARNER Patient   HIGHEST LEVEL OF EDUCATION - PRIMARY LEARNER  SOME COLLEGE   LEARNER PREFERENCE PRIMARY DEMONSTRATION     Depression Screening:     3 most recent PHQ Screens 3/20/2020   Little interest or pleasure in doing things Not at all   Feeling down, depressed, irritable, or hopeless Not at all   Total Score PHQ 2 0     Fall Risk Assessment:     Fall Risk Assessment, last 12 mths 3/20/2020   Able to walk? Yes   Fall in past 12 months? No     Abuse Screening:     Abuse Screening Questionnaire 7/15/2019   Do you ever feel afraid of your partner? N   Are you in a relationship with someone who physically or mentally threatens you? N   Is it safe for you to go home? Y     Coordination of Care Questionaire:   1. Have you been to the ER, urgent care clinic since your last visit? Hospitalized since your last visit? No, but had his pet scan  2. Have you seen or consulted any other health care providers outside of the [address] [place] [address] since your last visit? Include any pap smears or colon screening. No     Advanced Directive:   1. Do you have an Advanced Directive? no  2. Would you like information on Advanced Directives? No       Health Maintenance Due   Topic Date Due    Shingrix Vaccine Age 49> (2 of 2) 2019     Says his wife is giving him some pain pills he thinks.

## 2020-03-20 NOTE — PROGRESS NOTES
Assessment/Plan:    1. Lumbar radicular pain  - traMADoL (ULTRAM) 50 mg tablet; Take 1 Tab by mouth daily as needed for Pain for up to 30 days. Dispense: 30 Tab; Refill: 0  - naproxen (NAPROSYN) 500 mg tablet; Take 1 Tab by mouth two (2) times daily as needed for Pain. Dispense: 180 Tab; Refill: 0    2. High risk medication use  - TOXASSURE SELECT 13 (MW); Future      The plan was discussed with the patient. The patient verbalized understanding and is in agreement with the plan. All medication potential side effects were discussed with the patient. Health Maintenance:   Health Maintenance   Topic Date Due    Shingrix Vaccine Age 49> (2 of 2) 09/08/2019    Medicare Yearly Exam  07/15/2020    Pneumococcal 65+ years (2 of 2 - PPSV23) 07/15/2020    Lipid Screen  07/16/2020    GLAUCOMA SCREENING Q2Y  02/01/2021    DTaP/Tdap/Td series (4 - Td) 04/28/2029    Influenza Age 9 to Adult  Completed       Juan José Cornejo is a 66 y.o. male and presents with Hip Pain (left)     Subjective:  Pt is requesting tramadol. He has previously been getting tram[place]. However[facility] [place] . Saw [facility] ortho last year - I reviewed that note which states he had lumbar xrays in office showed diffuse arthritis changes. They felt his hip pain was radiating/referred from low back. He takes it daily. He has h/o L4/5 bulging disc. He's doing PT. Has mild oa in hips on xray. ROS:  Constitutional: No recent weight change. No weakness/fatigue. No f/c. Cardiovascular: No CP/palpitations. No BURGER/orthopnea/PND. Respiratory: No cough/sputum, dyspnea, wheezing. Gastointestinal: No dysphagia, reflux. No n/v. No constipation/diarrhea. No melena/rectal bleeding. Musculoskeletal: + joint pain/stiffness. No muscle pain/tenderness. Neurological: No seizures/numbness/weakness. No paresthesias. The problem list was updated as a part of today's visit.   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 SH:  Social History     Tobacco Use    Smoking status: Never Smoker    Smokeless tobacco: Never Used   Substance Use Topics    Alcohol use: No    Drug use: No       Medications/Allergies:  Current Outpatient Medications on File Prior to Visit   Medication Sig Dispense Refill    fluticasone propion-salmeteroL (ADVAIR DISKUS) 250-50 mcg/dose diskus inhaler Take 1 Puff by inhalation two (2) times a day. 1 Inhaler 12    amLODIPine (NORVASC) 5 mg tablet       oxybutynin chloride XL (DITROPAN XL) 5 mg CR tablet Take 5 mg by mouth daily.  tolterodine ER (DETROL LA) 4 mg ER capsule Take 1 Cap by mouth daily. 90 Cap 3    vit B Cmplx 3-FA-Vit C-Biotin (NEPHRO SHREYAS RX) 1- mg-mg-mcg tablet Take 1 Tab by mouth daily.  zeaxanthin, bulk, 90 % powd by Does Not Apply route.  atorvastatin (LIPITOR) 20 mg tablet Take  by mouth daily.  co-enzyme Q-10 (COQ-10) 100 mg capsule Take 100 mg by mouth daily.  ginseng 100 mg cap Take  by mouth.  lutein 40 mg cap Take 40 mg by mouth daily.  glucosam/nafisa-msm1/C/shaun/bosw (OSTEO BI-FLEX TRIPLE STRENGTH PO) Take  by mouth.  zinc 50 mg tab tablet Take  by mouth daily.  turmeric root extract 1,053 mg tab Take 1,500 mg by mouth.  alendronate (FOSAMAX) 70 mg tablet Take 70 mg by mouth every seven (7) days.  levothyroxine (SYNTHROID) 50 mcg tablet Take 100 mcg by mouth Daily (before breakfast).       FLUTICASONE PROPIONATE (FLONASE NA) by Nasal route.  pantoprazole (PROTONIX) 40 mg tablet Take 1 Tab by mouth daily. 90 Tab 3    aspirin delayed-release 81 mg tablet Take  by mouth daily.  BILBERRY PO Take  by mouth.  garlic 4,636 mg cap Take  by mouth.  cisco, Zingiber officinalis, 250 mg cap Take  by mouth.  ginkgo biloba 60 mg cap Take  by mouth.  krill oil 500 mg cap Take  by mouth.  multivitamin (ONE A DAY) tablet Take 1 Tab by mouth daily.  ascorbic acid, vitamin C, (VITAMIN C) 500 mg tablet Take  by mouth.  vitamin E (AQUA GEMS) 400 unit capsule Take  by mouth daily. No current facility-administered medications on file prior to visit. Allergies   Allergen Reactions    Amlodipine Besylate Other (comments)     Edema, DIZZINESS (per patient not dizziness but severe cough)       Objective:  Visit Vitals  /70 (BP 1 Location: Left arm, BP Patient Position: Sitting)   Pulse 62   Ht 5' 7\" (1.702 m)   Wt 180 lb (81.6 kg)   SpO2 95%   BMI 28.19 kg/m²      Constitutional: Well developed, nourished, no distress, alert   CV: S1, S2.  RRR. No murmurs/rubs. No edema. Pulm: No abnormalities on inspection. Clear to auscultation bilaterally. No wheezing/rhonchi. Normal effort. MS: Gait normal.  Joints without deformity/tenderness. Strength intact bilateral upper and lower ext. Neuro: A/O x 3. No focal motor or sensory deficits.  Speech normal.

## 2020-03-23 ENCOUNTER — APPOINTMENT (OUTPATIENT)
Dept: PHYSICAL THERAPY | Age: 75
End: 2020-03-23
Payer: MEDICARE

## 2020-03-23 RX ORDER — CODEINE PHOSPHATE AND GUAIFENESIN 10; 100 MG/5ML; MG/5ML
5 SOLUTION ORAL
Qty: 105 ML | Refills: 0 | Status: SHIPPED | OUTPATIENT
Start: 2020-03-23 | End: 2020-03-30

## 2020-03-24 DIAGNOSIS — E04.1 THYROID NODULE: Primary | ICD-10-CM

## 2020-03-25 ENCOUNTER — APPOINTMENT (OUTPATIENT)
Dept: PHYSICAL THERAPY | Age: 75
End: 2020-03-25
Payer: MEDICARE

## 2020-03-25 LAB — DRUGS UR: NORMAL

## 2020-03-30 ENCOUNTER — APPOINTMENT (OUTPATIENT)
Dept: PHYSICAL THERAPY | Age: 75
End: 2020-03-30
Payer: MEDICARE

## 2020-03-31 DIAGNOSIS — Z85.49 PERSONAL HISTORY OF MALIGNANT NEOPLASM OF OTHER MALE GENITAL ORGANS: ICD-10-CM

## 2020-03-31 DIAGNOSIS — K63.89 MESENTERIC MASS: ICD-10-CM

## 2020-03-31 DIAGNOSIS — Z85.46 HISTORY OF PROSTATE CANCER: ICD-10-CM

## 2020-03-31 DIAGNOSIS — E04.1 THYROID NODULE: ICD-10-CM

## 2020-04-01 ENCOUNTER — APPOINTMENT (OUTPATIENT)
Dept: PHYSICAL THERAPY | Age: 75
End: 2020-04-01

## 2020-04-01 NOTE — PROGRESS NOTES
PHYSICAL THERAPY - COURTESY CHECK-IN PHONE CALL    Patient Name: Ebony Baldwin        Date: 2020  : 1945   yes Patient  Verified  Insurance: Payor: [facility] / Plan: [facility] MEDICARE PART A & B / Product Type: Medicare /      Start time: 2:48 End time: 3:12     Detail of Conversation:    Reports, PET scan showed nodule and an US showed a nodule on thyroid. Waiting on results to r/o CA. Patient asks a few questions about bands and clam exercise. Currently reporting improvement in L hip pain, however, continues to experience L UE numbness and tingling. Recommended trial of RRIS to address radicular sx with guidelines to D/C if sx if causes peripheralization of sx or increase in numbness and tinlging. Compliant with lumbar roll for posture modification. Addressed all questions regarding HEP and plan to follow up in 1 week.      Follow-up Actions:    [x] Check-in via phone in 1-2 weeks  [] Provide updated HEP  [] Discharge no further need for check-ins (write DC note and send to physician)      Therapist: Sarah Nunes    Date: 2020 Time: 2:54 PM

## 2020-04-01 NOTE — PROGRESS NOTES
[address] [place] [address], [address] [place] [address] - Phone: [phone]  Fax: [phone] OF CARE/RECERTIFICATION FOR PHYSICAL THERAPY          Patient Name: Vishnu Gentile : 1945   Treatment/Medical Diagnosis: Low back pain [M54.5]   Onset Date: 1-2 months ago    Referral Source: Day Shepard MD [facility]): 3/9/2020   Prior Hospitalization: See Medical History Provider #: [id number]   Prior Level of Function: Painfree hiking and sleeping, Pain free participation in board games and fitting   Comorbidities: H/O Prostate Cancer (), HTN, L4/5 L/S Surgery, Thyroid Problems   Medications: Verified on Patient Summary List   [facility] from [facility]: 3 Missed Visits: 2     Goal/Measure of Progress Goal Met? 1.  Establish HEP. Status at last Eval: Goal Established  Current Status: HEP Established yes   2. Patient will report 25% improvement in left UE radicular symptoms with playing card games. Status at last Eval: Intermittent numbness and tingling in the left UE and radiates to the elbow Current Status: Notes continued numbness and tingling in the L UE no     Key Functional Changes/Progress: Patient has attended 3 PT sessions, including an initial evaluation, for left neck/shoulder pain and Left hip pain. Patient has made limited progress thus far due to recently sick resulting in 2 missed appointments. Patient was last seen on 20. Since last visit, pt has been temporarily placed on hold due to the nationwide concerns over COVID-19. Pt issued HEP and therapy staff will continue to contact pt every 1-2 weeks via phone to monitor progress. Plan to resume physical therapy once concerns improve. Thank you.   Problem List: pain affecting function, decrease ROM, decrease strength, edema affecting function, impaired gait/ balance, decrease ADL/ functional abilitiies, decrease activity tolerance, decrease flexibility/ joint mobility and decrease transfer abilities   Treatment Plan may include any combination of the following: Therapeutic exercise, Therapeutic activities, Neuromuscular re-education, Physical agent/modality, Gait/balance training, Manual therapy, Aquatic therapy, Patient education, Self Care training, Functional mobility training, Home safety training and Stair training  Patient Goal(s) has been updated and includes:  \"mobility\"    Goals for this certification period include and are to be achieved in   4  weeks:  1) Patient to be independent with HEP. 2) Patient will report decreased c/o pain to < or = 6/10 to facilitate improved sleeping tolerance with manageable sx in left hip.  3) Improve C/S AROM in L SB to >/=50% of normal to facilitate ADL's such as driving. 4) Patient will increase left hip strength in ABD to 4-/5 so patient has improved ability to tolerate prolonged amblation. Frequency / Duration:   Patient to be seen   1-2   times per week for   4    weeks:    Assessments/Recommendations: Continue therapy with the following recommendations: 2x per week for 4 weeks  If you have any questions/comments please contact us directly at [phone]. Thank you for allowing us to assist in the care of your patient.     Therapist Signature: Mina Nelson Date: 2/6/4264   Certification Period:  Reporting Period: 3/9/2020 to 6/8/2020  3/9/2020 to 4/1/2020 Time: 2:41 PM   NOTE TO PHYSICIAN:  [address] AND FAX TO   [facility]: [phone]  If you are unable to process this request in 24 hours please contact our office: [phone]    ___ I have read the above report and request that my patient continue as recommended.   ___ I have read the above report and request that my patient continue therapy with the following changes/special instructions: ________________________________________________   ___ I have read the above report and request that my patient be discharged from therapy.      Physician Signature:        Date:       Time:

## 2020-04-06 ENCOUNTER — APPOINTMENT (OUTPATIENT)
Dept: PHYSICAL THERAPY | Age: 75
End: 2020-04-06

## 2020-04-08 ENCOUNTER — APPOINTMENT (OUTPATIENT)
Dept: PHYSICAL THERAPY | Age: 75
End: 2020-04-08

## 2020-05-06 PROBLEM — K65.4 MESENTERIC PANNICULITIS (HCC): Status: ACTIVE | Noted: 2020-05-06

## 2020-06-03 ENCOUNTER — HOSPITAL ENCOUNTER (OUTPATIENT)
Dept: PHYSICAL THERAPY | Age: 75
Discharge: HOME | End: 2020-06-03
Payer: MEDICARE

## 2020-06-03 ENCOUNTER — APPOINTMENT (OUTPATIENT)
Dept: PHYSICAL THERAPY | Age: 75
End: 2020-06-03

## 2020-06-03 DIAGNOSIS — M54.16 LUMBAR RADICULAR PAIN: ICD-10-CM

## 2020-06-03 PROCEDURE — 97140 MANUAL THERAPY 1/> REGIONS: CPT

## 2020-06-03 PROCEDURE — 97110 THERAPEUTIC EXERCISES: CPT

## 2020-06-03 RX ORDER — TRAMADOL HYDROCHLORIDE 50 MG/1
50 TABLET ORAL
Qty: 30 TAB | Refills: 0 | Status: SHIPPED | OUTPATIENT
Start: 2020-06-03 | End: 2020-07-03

## 2020-06-03 RX ORDER — BENZONATATE 200 MG/1
200 CAPSULE ORAL
Qty: 30 CAP | Refills: 0 | Status: SHIPPED | OUTPATIENT
Start: 2020-06-03 | End: 2020-10-14

## 2020-06-03 NOTE — PROGRESS NOTES
[address] PHYSICAL THERAPY  [address] [place] [address] [address], [address],[facility], [facility] - Phone: [phone]  Fax: [phone] THERAPY          Patient Name: Wilfredo Lombardo : 1945   Treatment/Medical Diagnosis: Low back pain [M54.5]   Onset Date: 1-2 months ago    Referral Source: Chapo Crane MD [facility] of [facility]): 3/9/2020   Prior Hospitalization: See Medical History Provider #: [id number]   Prior Level of Function: Painfree hiking and sleeping, Pain free participation in board games and fitting   Comorbidities: H/O Prostate Cancer (), HTN, L4/5 L/S Surgery, Thyroid Problems   Medications: Verified on Patient Summary List   Visits from [facility]: 4 Missed Visits: 2     Goal/Measure of Progress Goal Met? 1.  Establish HEP. Status at last Eval: Goal Established  Current Status: HEP Established yes   2. Patient will report 25% improvement in left UE radicular symptoms with playing card games. Status at last Eval: Intermittent numbness and tingling in the left UE and radiates to the elbow Current Status: Notes intermittent numbness and tingling in the L UE Progressing     Key Functional Changes/Progress: Patient has attended 3 PT sessions, including an initial evaluation, for left neck/shoulder pain and Left hip pain. Pt was placed on temporary hold from 3/16/2020 to 6/3/2020 due to nationwide concerns over the COVID-19. Currently, the patient's main complaint is left sided neck pain with prolonged computer work. Average neck pain is rated at 5/10 and  9/10 at the worst. Neck AROM is as follows: flexion 45 deg, extension 35 deg, R/L Side bending 20/15 deg, and R/L Rotation 72/50 deg. Pt would benefit from continued PT services in order to decrease neck and left hip pain and TTP, improve ROM, strength, flexibility, balance, joint mobility and address remaining impairments.    Problem List: pain affecting function, decrease ROM, decrease strength, edema affecting function, impaired gait/ balance, decrease ADL/ functional abilitiies, decrease activity tolerance, decrease flexibility/ joint mobility and decrease transfer abilities   Treatment Plan may include any combination of the following: Therapeutic exercise, Therapeutic activities, Neuromuscular re-education, Physical agent/modality, Gait/balance training, Manual therapy, Aquatic therapy, Patient education, Self Care training, Functional mobility training, Home safety training and Stair training  Patient Goal(s) has been updated and includes:  \"mobility\"    Goals for this certification period include and are to be achieved in   4  weeks:  1) Patient to be independent with HEP. 2) Patient will report decreased c/o pain to < or = 6/10 to facilitate improved ability to perform prolonged computer work with manageable sx in the neck. 3) Improve C/S AROM in L SB to >/=50% of normal to facilitate ADL's such as driving. 4) Patient will increase left hip strength in ABD to 4-/5 so patient has improved ability to tolerate prolonged amblation. Frequency / Duration:   Patient to be seen   1-2   times per week for   4    weeks:    Assessments/Recommendations: Continue therapy with the following recommendations: 2x per week for 4 weeks  If you have any questions/comments please contact us directly at [phone]. Thank you for allowing us to assist in the care of your patient.     Therapist Signature: Amari Cedeno Date: 2/0/0657   Certification Period:  Reporting Period: 3/9/2020 to 6/8/2020  3/9/2020 to 6/3/2020 Time: 2:41 PM   NOTE TO PHYSICIAN:  [address] AND FAX TO   [facility]: [phone]  If you are unable to process this request in 24 hours please contact our office: [phone]    ___ I have read the above report and request that my patient continue as recommended.   ___ I have read the above report and request that my patient continue therapy with the following changes/special instructions: ________________________________________________   ___ I have read the above report and request that my patient be discharged from therapy.      Physician Signature:        Date:       Time:

## 2020-06-03 NOTE — PROGRESS NOTES
PHYSICAL THERAPY - DAILY TREATMENT NOTE    Patient Name: Emeka Diaz        Date: 6/3/2020  : 1945   yes Patient  Verified  Visit #:      12  Insurance: Payor: Sam Alt / Plan: [facility] MEDICARE PART A & B / Product Type: Medicare /      In time: 2:34 Out time: 3:09   Total Treatment Time: 35     Medicare/BCBS Time Tracking (below)   Total Timed Codes (min):  35 1:1 Treatment Time: 35     TREATMENT AREA =  Low back pain [M54.5]    SUBJECTIVE  Pain Level (on 0 to 10 scale):  2  / 10   Medication Changes/New allergies or changes in medical history, any new surgeries or procedures?    no  If yes, update Summary List   Subjective Functional Status/Changes:  []  No changes reported     Jory regressed which is not surprising. My knees have been bothering me. The neck isnt too bad - even though I dont do regularly what you told me. Every once in a while the left arm goes numb and I massage it and it does better. OBJECTIVE    26 min Therapeutic Exercise:  [x]  See flow sheet   Rationale:      increase ROM and increase strength to improve the patients ability to perform overhead activity     9 min Manual Therapy: STM to L TFL and glute med and rollator to ITB in R S/L, Supine STM to L>R UT/levator scap and sub occipitals   Rationale:      decrease pain, increase ROM, increase tissue extensibility, decrease trigger points and increase postural awareness to improve patient's ability to perform sitting ADLs    Billed With/As:   [x] TE   [] TA   [] Neuro   [] Self Care Patient Education: [x] Review HEP    [x] Progressed/Changed HEP based on:   [] positioning   [x] body mechanics   [] transfers   [] heat/ice application    [] other:      Other Objective/Functional Measures:    Patient reported increased dizziness with supine lying which continued following MT. Prior to PT services, pt reporting sinuses bothering him and he almost canceled this morning.  Modified treatment program and held further participation in therex due to dizziness. Patient requested patient education on therex and HEP prior to ending session. Patient participated in sitting rest break with water and PT supervision prior to ending treatment. Following, session, patient entering PT building observed JOHNATHON Acosta lying on ground, PT was advised in patient status. Isacc Arroyo reported feeling nauseous and dizzy from the heat and reported he was sitting outsided and decided to lie on the ground. Discussed events and status with patient's wife when she arrived - wife verbalized understanding and reported patient wasn't feeling well this morning and he shouldn't have come to treatment visit. Advised patient I would check on his status tomorrow. Post Treatment Pain Level (on 0 to 10) scale:  2-3  / 10     ASSESSMENT  Assessment/Changes in Function:     See PN     []  See Progress Note/Recertification   Patient will continue to benefit from skilled PT services to modify and progress therapeutic interventions, address functional mobility deficits, address ROM deficits, address strength deficits, analyze and address soft tissue restrictions, analyze and cue movement patterns, analyze and modify body mechanics/ergonomics, assess and modify postural abnormalities and instruct in home and community integration to attain remaining goals.    Progress toward goals / Updated goals:    See PN       PLAN  [x]  Upgrade activities as tolerated yes Continue plan of care   []  Discharge due to :    []  Other:      Therapist: Hilary Moffett    Date: 6/3/2020 Time: 4:41 PM     Future Appointments   Date Time Provider Lisset Chawla   6/3/2020  2:30 PM Julianna Gomez [facility]   6/10/2020  2:30 PM Julianna Gomez [facility]   6/17/2020  2:30 PM Julianna Gomez [facility]   6/24/2020  2:30 PM Preeti Gamboa [facility]   2/24/2021  1:00 PM Yoni Alfaro MD [facility]

## 2020-06-10 ENCOUNTER — APPOINTMENT (OUTPATIENT)
Dept: PHYSICAL THERAPY | Age: 75
End: 2020-06-10

## 2020-06-10 ENCOUNTER — OFFICE VISIT (OUTPATIENT)
Dept: FAMILY MEDICINE CLINIC | Age: 75
End: 2020-06-10

## 2020-06-10 ENCOUNTER — HOSPITAL ENCOUNTER (OUTPATIENT)
Dept: PHYSICAL THERAPY | Age: 75
End: 2020-06-10
Payer: MEDICARE

## 2020-06-10 VITALS
TEMPERATURE: 98 F | DIASTOLIC BLOOD PRESSURE: 72 MMHG | RESPIRATION RATE: 18 BRPM | HEIGHT: 67 IN | SYSTOLIC BLOOD PRESSURE: 134 MMHG | HEART RATE: 64 BPM | OXYGEN SATURATION: 100 % | WEIGHT: 179.2 LBS | BODY MASS INDEX: 28.12 KG/M2

## 2020-06-10 DIAGNOSIS — M79.89 LEG SWELLING: Primary | ICD-10-CM

## 2020-06-10 DIAGNOSIS — I82.462 ACUTE DEEP VEIN THROMBOSIS (DVT) OF CALF MUSCLE VEIN OF LEFT LOWER EXTREMITY (HCC): ICD-10-CM

## 2020-06-10 PROBLEM — E04.1 THYROID NODULE: Status: ACTIVE | Noted: 2020-06-10

## 2020-06-10 RX ORDER — ZINC GLUCONATE 10 MG
LOZENGE ORAL
COMMUNITY

## 2020-06-10 NOTE — PROGRESS NOTES
Maridee Seip is a 66 y.o. male (: 1945) presenting to address:    Chief Complaint   Patient presents with    Foot Swelling       Vitals:    06/10/20 1133   BP: 134/72   Pulse: 64   Resp: 18   Temp: 98 °F (36.7 °C)   TempSrc: Oral   SpO2: 100%   Weight: 179 lb 3.2 oz (81.3 kg)   Height: 5' 7\" (1.702 m)   PainSc:   2   PainLoc: Knee       Hearing/Vision:   No exam data present    Learning Assessment:     Learning Assessment 2016   PRIMARY LEARNER Patient   HIGHEST LEVEL OF EDUCATION - PRIMARY LEARNER  SOME COLLEGE   LEARNER PREFERENCE PRIMARY DEMONSTRATION     Depression Screening:     3 most recent PHQ Screens 3/20/2020   Little interest or pleasure in doing things Not at all   Feeling down, depressed, irritable, or hopeless Not at all   Total Score PHQ 2 0     Fall Risk Assessment:     Fall Risk Assessment, last 12 mths 3/20/2020   Able to walk? Yes   Fall in past 12 months? No     Abuse Screening:     Abuse Screening Questionnaire 7/15/2019   Do you ever feel afraid of your partner? N   Are you in a relationship with someone who physically or mentally threatens you? N   Is it safe for you to go home? Y     Coordination of Care Questionaire:   1. Have you been to the ER, urgent care clinic since your last visit? Hospitalized since your last visit? No     2. Have you seen or consulted any other health care providers outside of the [address] [place] [address] since your last visit? Include any pap smears or colon screening. Yes Dr Rosalino Flores Directive:   1. Do you have an Advanced Directive? No     2. Would you like information on Advanced Directives?   No     Health Maintenance Due   Topic Date Due    Shingrix Vaccine Age 49> (2 of 2) 2019

## 2020-06-10 NOTE — PROGRESS NOTES
Assessment/Plan:    1. Leg swelling- wells score of 2, moderate probability dvt.  -ck pvl to make sure no dvt. - DUPLEX LOWER EXT VENOUS LEFT; Future  - CBC W/O DIFF; Future  - METABOLIC PANEL, COMPREHENSIVE; Future      The plan was discussed with the patient. The patient verbalized understanding and is in agreement with the plan. All medication potential side effects were discussed with the patient. Health Maintenance:   Health Maintenance   Topic Date Due    Shingrix Vaccine Age 49> (2 of 2) 09/08/2019    Medicare Yearly Exam  07/15/2020    Pneumococcal 65+ years (2 of 2 - PPSV23) 07/15/2020    Lipid Screen  07/16/2020    Influenza Age 9 to Adult  08/01/2020    GLAUCOMA SCREENING Q2Y  02/01/2021    DTaP/Tdap/Td series (4 - Td) 04/28/2029       Марина Marsh is a 66 y.o. male and presents with Foot Swelling     Subjective:  Pt notes swelling in LLE x several weeks. Has been wearing a knee brace that his wife reports is \"tight\". They are concerned for DVT. Swelling worse at end of day, but doesn't resolve after sleeping/elevation. No leg pain. No dyspnea or chest pain. No family h/o dvt/PE. No recent surgery or immobility. ROS:  Constitutional: No recent weight change. No weakness/fatigue. No f/c. Cardiovascular: No CP/palpitations. No BURGER/orthopnea/PND. Respiratory: No cough/sputum, dyspnea, wheezing. Gastointestinal: No dysphagia, reflux. No n/v. No constipation/diarrhea. No melena/rectal bleeding. Musculoskeletal: No joint pain/stiffness. No muscle pain/tenderness. The problem list was updated as a part of today's visit.   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 SH:  Social History     Tobacco Use    Smoking status: Never Smoker    Smokeless tobacco: Never Used   Substance Use Topics    Alcohol use: No    Drug use: No       Medications/Allergies:  Current Outpatient Medications on File Prior to Visit   Medication Sig Dispense Refill    benzonatate (TESSALON) 200 mg capsule Take 1 Cap by mouth three (3) times daily as needed for Cough. 30 Cap 0    naproxen (NAPROSYN) 500 mg tablet Take 1 Tab by mouth two (2) times daily as needed for Pain. 180 Tab 0    fluticasone propion-salmeteroL (ADVAIR DISKUS) 250-50 mcg/dose diskus inhaler Take 1 Puff by inhalation two (2) times a day. 1 Inhaler 12    amLODIPine (NORVASC) 5 mg tablet       oxybutynin chloride XL (DITROPAN XL) 5 mg CR tablet Take 5 mg by mouth daily.  tolterodine ER (DETROL LA) 4 mg ER capsule Take 1 Cap by mouth daily. 90 Cap 3    vit B Cmplx 3-FA-Vit C-Biotin (NEPHRO SHREYAS RX) 1- mg-mg-mcg tablet Take 1 Tab by mouth daily.  zeaxanthin, bulk, 90 % powd by Does Not Apply route.  co-enzyme Q-10 (COQ-10) 100 mg capsule Take 100 mg by mouth daily.  ginseng 100 mg cap Take  by mouth.  lutein 40 mg cap Take 40 mg by mouth daily.  glucosam/nafisa-msm1/C/shaun/bosw (OSTEO BI-FLEX TRIPLE STRENGTH PO) Take  by mouth.  turmeric root extract 1,053 mg tab Take 1,500 mg by mouth.  alendronate (FOSAMAX) 70 mg tablet Take 70 mg by mouth every seven (7) days.  levothyroxine (SYNTHROID) 50 mcg tablet Take 100 mcg by mouth Daily (before breakfast).  FLUTICASONE PROPIONATE (FLONASE NA) by Nasal route.  pantoprazole (PROTONIX) 40 mg tablet Take 1 Tab by mouth daily. 90 Tab 3    BILBERRY PO Take  by mouth.       garlic 9,015 mg cap Take  by mouth.  ginger, Zingiber officinalis, 250 mg cap Take  by mouth.  ginkgo biloba 60 mg cap Take  by mouth.  krill oil 500 mg cap Take  by mouth.  multivitamin (ONE A DAY) tablet Take 1 Tab by mouth daily.  vitamin E (AQUA GEMS) 400 unit capsule Take  by mouth daily.  traMADoL (ULTRAM) 50 mg tablet Take 1 Tab by mouth daily as needed for Pain for up to 30 days. 30 Tab 0    atorvastatin (LIPITOR) 20 mg tablet Take  by mouth daily.  aspirin delayed-release 81 mg tablet Take  by mouth daily.  ascorbic acid, vitamin C, (VITAMIN C) 500 mg tablet Take  by mouth. No current facility-administered medications on file prior to visit. Allergies   Allergen Reactions    Amlodipine Besylate Other (comments)     Edema, DIZZINESS (per patient not dizziness but severe cough)       Objective:  Visit Vitals  /72 (BP 1 Location: Left arm, BP Patient Position: Sitting)   Pulse 64   Temp 98 °F (36.7 °C) (Oral)   Resp 18   Ht 5' 7\" (1.702 m)   Wt 179 lb 3.2 oz (81.3 kg)   SpO2 100%   BMI 28.07 kg/m²      Constitutional: Well developed, nourished, no distress, alert   CV: S1, S2.  RRR. No murmurs/rubs. No edema. Pulm: No abnormalities on inspection. Clear to auscultation bilaterally. No wheezing/rhonchi. Normal effort. MS: Gait normal.  Joints without deformity/tenderness. 2+DVT LLE. No palpable cord or erythema.

## 2020-06-10 NOTE — PROGRESS NOTES
Received call from moris at 5:10pm.  Acute DVT, occlusive R gastroc. Spoke with pt regarding staring eliquis tonight. He is established pt at [facility] and will call tomorrow to get appt.

## 2020-06-11 ENCOUNTER — TELEPHONE (OUTPATIENT)
Dept: FAMILY MEDICINE CLINIC | Age: 75
End: 2020-06-11

## 2020-06-17 ENCOUNTER — APPOINTMENT (OUTPATIENT)
Dept: PHYSICAL THERAPY | Age: 75
End: 2020-06-17

## 2020-06-17 ENCOUNTER — HOSPITAL ENCOUNTER (OUTPATIENT)
Dept: PHYSICAL THERAPY | Age: 75
End: 2020-06-17
Payer: MEDICARE

## 2020-06-24 ENCOUNTER — TELEPHONE (OUTPATIENT)
Dept: FAMILY MEDICINE CLINIC | Age: 75
End: 2020-06-24

## 2020-06-24 ENCOUNTER — APPOINTMENT (OUTPATIENT)
Dept: PHYSICAL THERAPY | Age: 75
End: 2020-06-24
Payer: MEDICARE

## 2020-06-24 ENCOUNTER — APPOINTMENT (OUTPATIENT)
Dept: PHYSICAL THERAPY | Age: 75
End: 2020-06-24

## 2020-06-24 ENCOUNTER — DOCUMENTATION ONLY (OUTPATIENT)
Dept: FAMILY MEDICINE CLINIC | Age: 75
End: 2020-06-24

## 2020-06-24 DIAGNOSIS — I82.462 ACUTE DEEP VEIN THROMBOSIS (DVT) OF CALF MUSCLE VEIN OF LEFT LOWER EXTREMITY (HCC): Primary | ICD-10-CM

## 2020-06-24 NOTE — TELEPHONE ENCOUNTER
Pt called and said his pain is 12/10, extreme pain on his left leg that has the acute DVT. Pt is elevating leg. Requesting pain medication. New stat DVT ordered. Faxed to [facility].

## 2020-07-06 DIAGNOSIS — I82.462 ACUTE DEEP VEIN THROMBOSIS (DVT) OF CALF MUSCLE VEIN OF LEFT LOWER EXTREMITY (HCC): ICD-10-CM

## 2020-07-14 DIAGNOSIS — M54.16 LUMBAR RADICULAR PAIN: Primary | ICD-10-CM

## 2020-07-14 RX ORDER — TRAMADOL HYDROCHLORIDE 50 MG/1
50 TABLET ORAL
Qty: 30 TAB | Refills: 0 | Status: SHIPPED | OUTPATIENT
Start: 2020-07-14 | End: 2020-08-13

## 2020-08-05 NOTE — PROGRESS NOTES
[address] [place] [address], [address] [place] [address] - Phone: [phone]  Fax: [phone]  DISCHARGE SUMMARY FOR PHYSICAL THERAPY          Patient Name: Tasneem Caba : 1945   Treatment/Medical Diagnosis: Low back pain [M54.5]   Onset Date: 1-2 months ago    Referral Source: Jacques Elam MD [facility] operated by [facility]): 3/9/2020   Prior Hospitalization: See Medical History Provider #: [id number]   Prior Level of Function: Painfree hiking and sleeping, Pain free participation in board games and fitting   Comorbidities: H/O Prostate Cancer (), HTN, L4/5 L/S Surgery, Thyroid Problems   Medications: Verified on Patient Summary List   Visits from [facility]: 4 Missed Visits: 2     Goal/Measure of Progress Goal Met? 1.  Establish HEP. Status at last Eval: Goal Established  Current Status: HEP Established yes   2. Patient will report 25% improvement in left UE radicular symptoms with playing card games. Status at last Eval: Intermittent numbness and tingling in the left UE and radiates to the elbow Current Status: Notes intermittent numbness and tingling in the L UE Progressing     Key Functional Changes/Progress: Patient attended 4 PT sessions, including an initial evaluation, for left neck/shoulder pain and Left hip pain. [facility] through therapy patient was diagnosed with a DVT in the L LE and instructed to D/C participation in PT services, therefore, a formal re-assessment of goals was not performed. Patient has been discharged until appropriate to participate in PT services. Assessments/Recommendations: Other: Hold Therapy per MD Reccomendation     If you have any questions/comments please contact us directly at  [phone]    Thank you for allowing us to assist in the care of your patient.     Therapist Signature: Orlando Moreno Date: 2020    Reporting Period: 3/9/2020 to 2020  Time: 3:34 PM     To ensure we are able to process the patients encounter and avoid risk of your patient receiving a bill for our services, please sign and return this discharge summary by 9/5/2020. (30days following DC date)    ___ I have read the above report and request that my patient continue as recommended.   ___ I have read the above report and request that my patient continue therapy with the following changes/special instructions:_________________________________________________________   ___ I have read the above report and request that my patient be discharged from therapy.      Physician Signature:        Date:       Time:

## 2020-08-07 NOTE — TELEPHONE ENCOUNTER
Pt called w/ an urgent request for his elliquis. Per Ivonne they are not going to cover his script at a [facility] pharmacy any so through there local pharmacy it would cost $450. He is requesting for a paper copy of the elliquis [place]. I did inform him of Dr Thien Oleary being off today and Monday and asked if this could wait till Tuesday and he said that it could not because he only had enough to last the weekend. I was not sure if maybe a different provider could print like a week's worth of he script on behalf of the patient for him to  today.

## 2020-08-28 ENCOUNTER — VIRTUAL VISIT (OUTPATIENT)
Dept: FAMILY MEDICINE CLINIC | Age: 75
End: 2020-08-28

## 2020-08-28 DIAGNOSIS — L03.116 CELLULITIS OF LEFT LOWER EXTREMITY: ICD-10-CM

## 2020-08-28 DIAGNOSIS — S80.862A INSECT BITE OF LEFT LOWER LEG, INITIAL ENCOUNTER: Primary | ICD-10-CM
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
## 2020-08-28 RX ORDER — HYDROCODONE BITARTRATE AND ACETAMINOPHEN 5; 325 MG/1; MG/1
1 TABLET ORAL
Qty: 21 TAB | Refills: 0 | Status: SHIPPED | OUTPATIENT
Start: 2020-08-28 | End: 2020-09-04

## 2020-08-28 RX ORDER — DOXYCYCLINE 100 MG/1
100 CAPSULE ORAL 2 TIMES DAILY
Qty: 14 CAP | Refills: 0 | Status: SHIPPED | OUTPATIENT
Start: 2020-08-28 | End: 2020-09-04

## 2020-08-28 NOTE — PROGRESS NOTES
Stevan Vickers is a 66 y.o. male who was seen by synchronous (real-time) audio-video technology on 8/28/2020 for Wound Infection    Assessment & Plan:   Diagnoses and all orders for this visit:    1. Insect bite of left lower leg, initial encounter  -     HYDROcodone-acetaminophen (Norco) 5-325 mg per tablet; Take 1 Tab by mouth every eight (8) hours as needed for Pain for up to 7 days. Max Daily Amount: 3 Tabs. 2. Cellulitis of left lower extremity  -     doxycycline (MONODOX) 100 mg capsule; Take 1 Cap by mouth two (2) times a day for 7 days. A total of 15 minutes was spent with the patient of which 15 minutes was spent in counseling regarding above      Subjective:     Pt was trying to do yardwork. Doesn't recall injury. But suddenly started having pain over R leg, with swelling. Went to ER, which dx him with a hematoma (he's on eliquis). He subsequently developed some blisters over the site which are draining. But he has excruciating pain at the site with standing for a while. No varicose veins. He has warmth over the R leg, compared to L. There is a pic in [facility] that he sent. Prior to Admission medications    Medication Sig Start Date End Date Taking? Authorizing Provider   apixaban (ELIQUIS) 5 mg tablet Take 1 Tab by mouth two (2) times a day. 8/7/20   Ranulfo López MD   magnesium 250 mg tab Take  by mouth. Provider, Historical   potassium 99 mg tablet Take 99 mg by mouth daily. Provider, Historical   benzonatate (TESSALON) 200 mg capsule Take 1 Cap by mouth three (3) times daily as needed for Cough. 6/3/20   Albino Barney MD   fluticasone propion-salmeteroL (ADVAIR DISKUS) 250-50 mcg/dose diskus inhaler Take 1 Puff by inhalation two (2) times a day. 2/27/20   Albino Barney MD   amLODIPine (NORVASC) 5 mg tablet  1/21/20   Provider, Historical   oxybutynin chloride XL (DITROPAN XL) 5 mg CR tablet Take 5 mg by mouth daily.     Provider, Historical   tolterodine ER (DETROL LA) 4 mg ER capsule Take 1 Cap by mouth daily. 11/27/19   Adonay Frias MD   vit B Cmplx 3-FA-Vit C-Biotin (NEPHRO SHREYAS RX) 1- mg-mg-mcg tablet Take 1 Tab by mouth daily. Provider, Historical   zeaxanthin, bulk, 90 % powd by Does Not Apply route. Provider, Historical   atorvastatin (LIPITOR) 20 mg tablet Take  by mouth daily. Provider, Historical   co-enzyme Q-10 (COQ-10) 100 mg capsule Take 100 mg by mouth daily. Provider, Historical   ginseng 100 mg cap Take  by mouth. Provider, Historical   lutein 40 mg cap Take 40 mg by mouth daily. Provider, Historical   glucosam/nafisa-msm1/C/shaun/bosw (OSTEO BI-FLEX TRIPLE STRENGTH PO) Take  by mouth. Provider, Historical   turmeric root extract 1,053 mg tab Take 1,500 mg by mouth. Provider, Historical   alendronate (FOSAMAX) 70 mg tablet Take 70 mg by mouth every seven (7) days. Provider, Historical   levothyroxine (SYNTHROID) 50 mcg tablet Take 100 mcg by mouth Daily (before breakfast). Provider, Historical   FLUTICASONE PROPIONATE (FLONASE NA) by Nasal route. Provider, Historical   pantoprazole (PROTONIX) 40 mg tablet Take 1 Tab by mouth daily. 10/7/16   Mason Ku MD   aspirin delayed-release 81 mg tablet Take  by mouth daily. Provider, Historical   BILBERRY PO Take  by mouth. Provider, Historical   garlic 3,850 mg cap Take  by mouth. Provider, Historical   cisco, Zingiber officinalis, 250 mg cap Take  by mouth. Provider, Historical   ginkgo biloba 60 mg cap Take  by mouth. Provider, Historical   krill oil 500 mg cap Take  by mouth. Provider, Historical   multivitamin (ONE A DAY) tablet Take 1 Tab by mouth daily. Provider, Historical   ascorbic acid, vitamin C, (VITAMIN C) 500 mg tablet Take  by mouth. Provider, Historical   vitamin E (AQUA GEMS) 400 unit capsule Take  by mouth daily.     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 Objective:   No flowsheet data found.      [INSTRUCTIONS:  \"[x]\" Indicates a positive item  \"[]\" Indicates a negative item  -- DELETE ALL ITEMS NOT EXAMINED]    Constitutional: [x] Appears well-developed and well-nourished [x] No apparent distress      [] Abnormal -     Mental status: [x] Alert and awake  [x] Oriented to person/place/time [x] Able to follow commands    [] Abnormal -     Eyes:   EOM    [x]  Normal    [] Abnormal -   Sclera  [x]  Normal    [] Abnormal -          Discharge [x]  None visible   [] Abnormal -     HENT: [x] Normocephalic, atraumatic  [] Abnormal -   [x] Mouth/Throat: Mucous membranes are moist    External Ears [x] Normal  [] Abnormal -    Neck: [x] No visualized mass [] Abnormal -     Pulmonary/Chest: [x] Respiratory effort normal   [x] No visualized signs of difficulty breathing or respiratory distress        [] Abnormal -      Musculoskeletal:   [] Normal gait with no signs of ataxia         [] Normal range of motion of neck        [] Abnormal -     Neurological:        [] No Facial Asymmetry (Cranial nerve 7 motor function) (limited exam due to video visit)          [] No gaze palsy        [] Abnormal -          Skin:        [] No significant exanthematous lesions or discoloration noted on facial skin         [x] Abnormal - large ecchymoses over R leg Psychiatric:       [x] Normal Affect [] Abnormal -        [x] No Hallucinations    Other pertinent observable physical exam findings:-        We discussed the expected course, resolution and complications of the diagnosis(es) in detail. Medication risks, benefits, costs, interactions, and alternatives were discussed as indicated. I advised him to contact the office if his condition worsens, changes or fails to improve as anticipated. He expressed understanding with the diagnosis(es) and plan. Radha Montague, who was evaluated through a patient-initiated, synchronous (real-time) audio encounter, and/or his healthcare decision maker, is aware that it is a billable service, with coverage as determined by his insurance carrier. He provided verbal consent to proceed: Yes, and patient identification was verified. It was conducted pursuant to the emergency declaration under the [address] [place] [address] authority and the [employer] Act. A caregiver was present when appropriate. Ability to conduct physical exam was limited. I was at home. The patient was at home.       Pan Gutierrez MD

## 2020-09-02 ENCOUNTER — OFFICE VISIT (OUTPATIENT)
Dept: FAMILY MEDICINE CLINIC | Age: 75
End: 2020-09-02

## 2020-09-02 VITALS
RESPIRATION RATE: 17 BRPM | TEMPERATURE: 98.7 F | WEIGHT: 176.6 LBS | OXYGEN SATURATION: 99 % | SYSTOLIC BLOOD PRESSURE: 125 MMHG | BODY MASS INDEX: 27.72 KG/M2 | HEIGHT: 67 IN | DIASTOLIC BLOOD PRESSURE: 67 MMHG | HEART RATE: 60 BPM

## 2020-09-02 DIAGNOSIS — S80.861A INSECT BITE OF RIGHT LOWER LEG, INITIAL ENCOUNTER: ICD-10-CM

## 2020-09-02 DIAGNOSIS — R94.6 ABNORMAL THYROID SCAN: ICD-10-CM

## 2020-09-02 DIAGNOSIS — Z00.00 MEDICARE ANNUAL WELLNESS VISIT, SUBSEQUENT: ICD-10-CM

## 2020-09-02 DIAGNOSIS — I10 ESSENTIAL HYPERTENSION: ICD-10-CM

## 2020-09-02 DIAGNOSIS — M79.89 LEG SWELLING: Primary | ICD-10-CM

## 2020-09-02 DIAGNOSIS — E78.2 MIXED HYPERCHOLESTEROLEMIA AND HYPERTRIGLYCERIDEMIA: ICD-10-CM

## 2020-09-02 DIAGNOSIS — E04.1 THYROID NODULE: ICD-10-CM
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
## 2020-09-02 RX ORDER — FAMOTIDINE 20 MG/1
20 TABLET, FILM COATED ORAL 2 TIMES DAILY
COMMUNITY
End: 2021-05-19 | Stop reason: ALTCHOICE

## 2020-09-02 RX ORDER — DESLORATADINE 5 MG/1
5 TABLET ORAL
COMMUNITY
End: 2021-12-08 | Stop reason: ALTCHOICE

## 2020-09-02 NOTE — PATIENT INSTRUCTIONS
Medicare Wellness Visit, Male The best way to live healthy is to have a lifestyle where you eat a well-balanced diet, exercise regularly, limit alcohol use, and quit all forms of tobacco/nicotine, if applicable. Regular preventive services are another way to keep healthy. Preventive services (vaccines, screening tests, monitoring & exams) can help personalize your care plan, which helps you manage your own care. Screening tests can find health problems at the earliest stages, when they are easiest to treat. [facility] follows the current, evidence-based guidelines published by the [employer] ([facility]) when recommending preventive services for our patients. Because we follow these guidelines, sometimes recommendations change over time as research supports it. (For example, a prostate screening blood test is no longer routinely recommended for men with no symptoms). Of course, you and your doctor may decide to screen more often for some diseases, based on your risk and co-morbidities (chronic disease you are already diagnosed with). Preventive services for you include: - Medicare offers their members a free annual wellness visit, which is time for you and your primary care provider to discuss and plan for your preventive service needs. Take advantage of this benefit every year! 
-All adults over age 72 should receive the recommended pneumonia vaccines. Current USPSTF guidelines recommend a series of two vaccines for the best pneumonia protection.  
-All adults should have a flu vaccine yearly and tetanus vaccine every 10 years. 
-All adults age 48 and older should receive the shingles vaccines (series of two vaccines).       
-All adults age 38-68 who are overweight should have a diabetes screening test once every three years.  
-Other screening tests & preventive services for persons with diabetes include: an eye exam to screen for diabetic retinopathy, a kidney function test, a foot exam, and stricter control over your cholesterol.  
-Cardiovascular screening for adults with routine risk involves an electrocardiogram (ECG) at intervals determined by the provider.  
-Colorectal cancer screening should be done for adults age 54-65 with no increased risk factors for colorectal cancer. There are a number of acceptable methods of screening for this type of cancer. Each test has its own benefits and drawbacks. Discuss with your provider what is most appropriate for you during your annual wellness visit. The different tests include: colonoscopy (considered the best screening method), a fecal occult blood test, a fecal DNA test, and sigmoidoscopy. 
-All adults born between [facility] should be screened once for Hepatitis C. 
-An Abdominal Aortic Aneurysm (AAA) Screening is recommended for men age 73-68 who has ever smoked in their lifetime. Here is a list of your current Health Maintenance items (your personalized list of preventive services) with a due date: 
Health Maintenance Due Topic Date Due  Cholesterol Test   07/16/2020  Yearly Flu Vaccine (1) 09/01/2020

## 2020-09-02 NOTE — PROGRESS NOTES
Reema Badillo is a 66 y.o. male (: 1945) presenting to address:    Chief Complaint   Patient presents with    Leg Swelling    Annual Wellness Visit       Vitals:    20 1147   BP: 125/67   Pulse: 60   Resp: 17   Temp: 98.7 °F (37.1 °C)   TempSrc: Oral   SpO2: 99%   Weight: 176 lb 9.6 oz (80.1 kg)   Height: 5' 7\" (1.702 m)   PainSc:   5       Hearing/Vision:      Hearing Screening    125Hz 250Hz 500Hz 1000Hz 2000Hz 3000Hz 4000Hz 6000Hz 8000Hz   Right ear:            Left ear:               Visual Acuity Screening    Right eye Left eye Both eyes   Without correction:      With correction: 20/25 20/25 20/20       Learning Assessment:     Learning Assessment 2016   PRIMARY LEARNER Patient   HIGHEST LEVEL OF EDUCATION - PRIMARY LEARNER  SOME COLLEGE   LEARNER PREFERENCE PRIMARY DEMONSTRATION     Depression Screening:     3 most recent PHQ Screens 2020   Little interest or pleasure in doing things Not at all   Feeling down, depressed, irritable, or hopeless Not at all   Total Score PHQ 2 0     Fall Risk Assessment:     Fall Risk Assessment, last 12 mths 2020   Able to walk? Yes   Fall in past 12 months? No     Abuse Screening:     Abuse Screening Questionnaire 2020   Do you ever feel afraid of your partner? N   Are you in a relationship with someone who physically or mentally threatens you? N   Is it safe for you to go home? Y     Coordination of Care Questionaire:   1. Have you been to the ER, urgent care clinic since your last visit? Hospitalized since your last visit? Yes, urgent care in [place]    2. Have you seen or consulted any other health care providers outside of the [address] [place] [address] since your last visit? Include any pap smears or colon screening. No   Advanced Directive:   1. Do you have an Advanced Directive? No     2. Would you like information on Advanced Directives?  No       Health Maintenance Due   Topic Date Due    Lipid Screen  2020    Flu Vaccine (1) 09/01/2020     Pt also presents with list with other supplements.  Noting hydraplenish/hyaluronic acid, reishi, vitamin B12, vitamin , viactiv, valerian root, cranberry, beet root, buckbean, astragalus, ashwagandha,

## 2020-09-02 NOTE — PROGRESS NOTES
Assessment/Plan:    1. Leg swelling and suspected  2. Insect bite of right lower leg, initial encounter  -worsening sx. Now with more swelling  And I am concerned for compartment syndrome, although he still has pulses and sensation is intact. Pt to go to ER for urgent eval, imaging (CT) and labs. 3. Mixed hypercholesterolemia and hypertriglyceridemia  - LIPID PANEL; Future    4. Essential hypertension  - METABOLIC PANEL, COMPREHENSIVE; Future  - LIPID PANEL; Future  - CBC WITH AUTOMATED DIFF; Future    5. Medicare annual wellness visit, subsequent      The plan was discussed with the patient. The patient verbalized understanding and is in agreement with the plan. All medication potential side effects were discussed with the patient. Health Maintenance:   Health Maintenance   Topic Date Due    Lipid Screen  07/16/2020    Flu Vaccine (1) 09/01/2020    GLAUCOMA SCREENING Q2Y  02/01/2021    Medicare Yearly Exam  09/03/2021    DTaP/Tdap/Td series (5 - Td) 07/09/2030    Shingrix Vaccine Age 50>  Completed    Pneumococcal 65+ years  Completed       Ju Dorman is a 66 y.o. male and presents with Leg Swelling     Subjective:  Pt was trying to do yardwork on 8/22. Doesn't recall injury. But suddenly started having pain over R leg, with swelling. Went to ER (8/22) , which dx him with a hematoma (he's on eliquis). He subsequently developed some blisters over the site which are draining. But he has excruciating pain at the site with standing for a while. He went back to ER and xray was nml on 8/25. No varicose veins. He has warmth over the R leg, compared to L. I treated him for cellulitis with doxy but his sx appear to be worsening. Having more swelling, ecchymoses developing in toes, worsening pain. Denies paresthesias or numbness. Extremity is warm to touch. htn - bp controlled. Compliant with meds. Due for labs. ROS:  Constitutional: No recent weight change. No weakness/fatigue.   No f/c.   Cardiovascular: No CP/palpitations. No BURGER/orthopnea/PND. Respiratory: No cough/sputum, dyspnea, wheezing. Gastointestinal: No dysphagia, reflux. No n/v. No constipation/diarrhea. No melena/rectal bleeding. Genitourinary: No dysuria, urinary hesitancy, nocturia, hematuria. No incontinence. Musculoskeletal: No joint pain/stiffness. No muscle pain/tenderness. Neurological: No seizures/numbness/weakness. No paresthesias. The problem list was updated as a part of today's visit. 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 SH:  Social History     Tobacco Use    Smoking status: Never Smoker    Smokeless tobacco: Never Used   Substance Use Topics    Alcohol use: No    Drug use: No       Medications/Allergies:  Current Outpatient Medications on File Prior to Visit   Medication Sig Dispense Refill    doxycycline (MONODOX) 100 mg capsule Take 1 Cap by mouth two (2) times a day for 7 days. 14 Cap 0    HYDROcodone-acetaminophen (Norco) 5-325 mg per tablet Take 1 Tab by mouth every eight (8) hours as needed for Pain for up to 7 days. Max Daily Amount: 3 Tabs. 21 Tab 0    apixaban (ELIQUIS) 5 mg tablet Take 1 Tab by mouth two (2) times a day. 180 Tab 0    magnesium 250 mg tab Take  by mouth.       potassium 99 mg tablet Take 99 mg by mouth daily.      benzonatate (TESSALON) 200 mg capsule Take 1 Cap by mouth three (3) times daily as needed for Cough. 30 Cap 0    fluticasone propion-salmeteroL (ADVAIR DISKUS) 250-50 mcg/dose diskus inhaler Take 1 Puff by inhalation two (2) times a day. 1 Inhaler 12    amLODIPine (NORVASC) 5 mg tablet       oxybutynin chloride XL (DITROPAN XL) 5 mg CR tablet Take 5 mg by mouth daily.  tolterodine ER (DETROL LA) 4 mg ER capsule Take 1 Cap by mouth daily. 90 Cap 3    vit B Cmplx 3-FA-Vit C-Biotin (NEPHRO SHREYAS RX) 1- mg-mg-mcg tablet Take 1 Tab by mouth daily.  zeaxanthin, bulk, 90 % powd by Does Not Apply route.  atorvastatin (LIPITOR) 20 mg tablet Take  by mouth daily.  co-enzyme Q-10 (COQ-10) 100 mg capsule Take 100 mg by mouth daily.  ginseng 100 mg cap Take  by mouth.  lutein 40 mg cap Take 40 mg by mouth daily.  glucosam/nafisa-msm1/C/shaun/bosw (OSTEO BI-FLEX TRIPLE STRENGTH PO) Take  by mouth.  turmeric root extract 1,053 mg tab Take 1,500 mg by mouth.  alendronate (FOSAMAX) 70 mg tablet Take 70 mg by mouth every seven (7) days.  levothyroxine (SYNTHROID) 50 mcg tablet Take 100 mcg by mouth Daily (before breakfast).  FLUTICASONE PROPIONATE (FLONASE NA) by Nasal route.  pantoprazole (PROTONIX) 40 mg tablet Take 1 Tab by mouth daily. 90 Tab 3    aspirin delayed-release 81 mg tablet Take  by mouth daily.  BILBERRY PO Take  by mouth.  garlic 0,548 mg cap Take  by mouth.  ginger, Zingiber officinalis, 250 mg cap Take  by mouth.  ginkgo biloba 60 mg cap Take  by mouth.  krill oil 500 mg cap Take  by mouth.  multivitamin (ONE A DAY) tablet Take 1 Tab by mouth daily.  ascorbic acid, vitamin C, (VITAMIN C) 500 mg tablet Take  by mouth.  vitamin E (AQUA GEMS) 400 unit capsule Take  by mouth daily. No current facility-administered medications on file prior to visit.          Allergies   Allergen Reactions    Amlodipine Besylate Other (comments)     Edema, DIZZINESS (per patient not dizziness but severe cough)       Objective:  Visit Vitals  /67 (BP 1 Location: Left arm, BP Patient Position: Sitting)   Pulse 60   Temp 98.7 °F (37.1 °C) (Oral)   Resp 17   Ht 5' 7\" (1.702 m)   Wt 176 lb 9.6 oz (80.1 kg)   SpO2 99%   BMI 27.66 kg/m²      Constitutional: Well developed, nourished, no distress, alert   CV: S1, S2.  RRR. No murmurs/rubs. 2+ pitting edema. Pulm: No abnormalities on inspection. Clear to auscultation bilaterally. No wheezing/rhonchi. Normal effort. MS: Gait antalgic. RLE calf is 1.5in larger than R. Neuro: A/O x 3. No focal motor or sensory deficits. Speech normal. Normal sensation in RLE. Skin: +ecchymoses over proximal toes on R foot. Darker coloration to RLE with patchy erythema. 2.5cm lump that is not fluctuant with overlying abrasion/scab   Psych: Appropriate affect, judgement and insight. Short-term memory intact. This is the Subsequent Medicare Annual Wellness Exam, performed 12 months or more after the Initial AWV or the last Subsequent AWV    I have reviewed the patient's medical history in detail and updated the computerized patient record.      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    Mesenteric panniculitis (HCC) K65.4    Thyroid nodule E04.1    Acute deep vein thrombosis (DVT) of calf muscle vein of left lower extremity (HCC) Y77.993     Past Medical History:   Diagnosis Date    ED (erectile dysfunction)     GERD (gastroesophageal reflux disease)     History of MRSA infection     Hypertension     Prostate CA ([address]     radical prostatectomy      Past Surgical History:   Procedure Laterality Date    HX APPENDECTOMY      HX GI      HX KNEE ARTHROSCOPY      arthroscopic knee    HX ORTHOPAEDIC      L4-5 facetectomy    HX RADICAL PROSTATECTOMY  2004    HX TONSILLECTOMY       Current Outpatient Medications   Medication Sig Dispense Refill    doxycycline (MONODOX) 100 mg capsule Take 1 Cap by mouth two (2) times a day for 7 days. 14 Cap 0    HYDROcodone-acetaminophen (Norco) 5-325 mg per tablet Take 1 Tab by mouth every eight (8) hours as needed for Pain for up to 7 days. Max Daily Amount: 3 Tabs. 21 Tab 0    apixaban (ELIQUIS) 5 mg tablet Take 1 Tab by mouth two (2) times a day. 180 Tab 0    magnesium 250 mg tab Take  by mouth.  potassium 99 mg tablet Take 99 mg by mouth daily.  benzonatate (TESSALON) 200 mg capsule Take 1 Cap by mouth three (3) times daily as needed for Cough. 30 Cap 0    fluticasone propion-salmeteroL (ADVAIR DISKUS) 250-50 mcg/dose diskus inhaler Take 1 Puff by inhalation two (2) times a day. 1 Inhaler 12    amLODIPine (NORVASC) 5 mg tablet       oxybutynin chloride XL (DITROPAN XL) 5 mg CR tablet Take 5 mg by mouth daily.  tolterodine ER (DETROL LA) 4 mg ER capsule Take 1 Cap by mouth daily. 90 Cap 3    vit B Cmplx 3-FA-Vit C-Biotin (NEPHRO SHREYAS RX) 1- mg-mg-mcg tablet Take 1 Tab by mouth daily.  zeaxanthin, bulk, 90 % powd by Does Not Apply route.  atorvastatin (LIPITOR) 20 mg tablet Take  by mouth daily.  co-enzyme Q-10 (COQ-10) 100 mg capsule Take 100 mg by mouth daily.  ginseng 100 mg cap Take  by mouth.  lutein 40 mg cap Take 40 mg by mouth daily.  glucosam/nafisa-msm1/C/shaun/bosw (OSTEO BI-FLEX TRIPLE STRENGTH PO) Take  by mouth.  turmeric root extract 1,053 mg tab Take 1,500 mg by mouth.       alendronate (FOSAMAX) 70 mg tablet Take 70 mg by mouth every seven (7) days.  levothyroxine (SYNTHROID) 50 mcg tablet Take 100 mcg by mouth Daily (before breakfast).  FLUTICASONE PROPIONATE (FLONASE NA) by Nasal route.  pantoprazole (PROTONIX) 40 mg tablet Take 1 Tab by mouth daily. 90 Tab 3    aspirin delayed-release 81 mg tablet Take  by mouth daily.  BILBERRY PO Take  by mouth.  garlic 1,296 mg cap Take  by mouth.  cisco, Zingiber officinalis, 250 mg cap Take  by mouth.  ginkgo biloba 60 mg cap Take  by mouth.  krill oil 500 mg cap Take  by mouth.  multivitamin (ONE A DAY) tablet Take 1 Tab by mouth daily.  ascorbic acid, vitamin C, (VITAMIN C) 500 mg tablet Take  by mouth.  vitamin E (AQUA GEMS) 400 unit capsule Take  by mouth daily. Allergies   Allergen Reactions    Amlodipine Besylate Other (comments)     Edema, DIZZINESS (per patient not dizziness but severe cough)       Family History   Problem Relation Age of Onset    Macular Degen Mother     Stroke Father     Cancer Father         bladder     Social History     Tobacco Use    Smoking status: Never Smoker    Smokeless tobacco: Never Used   Substance Use Topics    Alcohol use: No       Depression Risk Factor Screening:     3 most recent PHQ Screens 9/2/2020   Little interest or pleasure in doing things Not at all   Feeling down, depressed, irritable, or hopeless Not at all   Total Score PHQ 2 0       Alcohol Risk Factor Screening (MALE > 65): Do you average more 1 drink per night or more than 7 drinks a week: No    In the past three months have you have had more than 4 drinks containing alcohol on one occasion: No      Functional Ability and Level of Safety:   Hearing: Hearing is good. Activities of Daily Living: The home contains: no safety equipment.   Patient does total self care     Ambulation: with no difficulty     Fall Risk:  Fall Risk Assessment, last 12 mths 9/2/2020   Able to walk? Yes   Fall in past 12 months? No     Abuse Screen:  Patient is not abused       Cognitive Screening   Has your family/caregiver stated any concerns about your memory: no     Cognitive Screening: . Patient Care Team   Patient Care Team:  Melissa Root MD as PCP - General (Internal Medicine)  Melissa Root MD as PCP - [facility]  Patricia Diaz MD as Consulting Provider (Hematology and Oncology)    Assessment/Plan   Education and counseling provided:  Are appropriate based on today's review and evaluation  End-of-Life planning (with patient's consent)  Influenza Vaccine    Diagnoses and all orders for this visit:    1. Leg swelling    2. Insect bite of right lower leg, initial encounter    3. Mixed hypercholesterolemia and hypertriglyceridemia  -     LIPID PANEL; Future    4. Essential hypertension  -     METABOLIC PANEL, COMPREHENSIVE; Future  -     LIPID PANEL; Future  -     CBC WITH AUTOMATED DIFF; Future    5.  Medicare annual wellness visit, subsequent        Health Maintenance Due   Topic Date Due    Lipid Screen  07/16/2020    Flu Vaccine (1) 09/01/2020

## 2020-09-03 ENCOUNTER — TELEPHONE (OUTPATIENT)
Dept: FAMILY MEDICINE CLINIC | Age: 75
End: 2020-09-03

## 2020-09-03 NOTE — TELEPHONE ENCOUNTER
Patient was seen in the ER yesterday as Dr Landon Singh requested and they gave him medication that he wanted to make sure its okay to take with all of his other medications.  He is asking to speak to Shirleyann Galeazzi.

## 2020-09-04 NOTE — TELEPHONE ENCOUNTER
Pt was seen at the ED. Asking if he is to finish the doxycycline or just do the new [facility] ED dr gave him. Cephalexin  Mg. Please advise.

## 2020-09-21 DIAGNOSIS — I82.462 ACUTE DEEP VEIN THROMBOSIS (DVT) OF CALF MUSCLE VEIN OF LEFT LOWER EXTREMITY (HCC): ICD-10-CM

## 2020-10-12 LAB
A-G RATIO,AGRAT: 1.8 RATIO (ref 1.1–2.6)
ABSOLUTE LYMPHOCYTE COUNT, 10803: 1 K/UL (ref 1–4.8)
ALBUMIN SERPL-MCNC: 4 G/DL (ref 3.5–5)
ALP SERPL-CCNC: 48 U/L (ref 40–125)
ALT SERPL-CCNC: 12 U/L (ref 5–40)
ANION GAP SERPL CALC-SCNC: 9.4 MMOL/L (ref 3–15)
AST SERPL W P-5'-P-CCNC: 13 U/L (ref 10–37)
BASOPHILS # BLD: 0.1 K/UL (ref 0–0.2)
BASOPHILS NFR BLD: 1 % (ref 0–2)
BILIRUB SERPL-MCNC: 0.4 MG/DL (ref 0.2–1.2)
BUN SERPL-MCNC: 22 MG/DL (ref 6–22)
CALCIUM SERPL-MCNC: 8.7 MG/DL (ref 8.4–10.5)
CHLORIDE SERPL-SCNC: 106 MMOL/L (ref 98–110)
CHOLEST SERPL-MCNC: 134 MG/DL (ref 110–200)
CO2 SERPL-SCNC: 27 MMOL/L (ref 20–32)
CREAT SERPL-MCNC: 1.3 MG/DL (ref 0.8–1.6)
EOSINOPHIL # BLD: 0.3 K/UL (ref 0–0.5)
EOSINOPHIL NFR BLD: 4 % (ref 0–6)
ERYTHROCYTE [DISTWIDTH] IN BLOOD BY AUTOMATED COUNT: 11.9 % (ref 10–15.5)
GFRAA, 66117: >60
GFRNA, 66118: 51.7
GLOBULIN,GLOB: 2.2 G/DL (ref 2–4)
GLUCOSE SERPL-MCNC: 86 MG/DL (ref 70–99)
GRANULOCYTES,GRANS: 73 % (ref 40–75)
HCT VFR BLD AUTO: 43.6 % (ref 37.8–52.2)
HDLC SERPL-MCNC: 3.2 MG/DL (ref 0–5)
HDLC SERPL-MCNC: 42 MG/DL
HGB BLD-MCNC: 15.1 G/DL (ref 12.6–17.1)
LDL/HDL RATIO,LDHD: 1.7
LDLC SERPL CALC-MCNC: 71 MG/DL (ref 50–99)
LYMPHOCYTES, LYMLT: 14 % (ref 20–45)
MCH RBC QN AUTO: 30 PG (ref 26–34)
MCHC RBC AUTO-ENTMCNC: 35 G/DL (ref 31–36)
MCV RBC AUTO: 86 FL (ref 80–95)
MONOCYTES # BLD: 0.7 K/UL (ref 0.1–1)
MONOCYTES NFR BLD: 9 % (ref 3–12)
NEUTROPHILS # BLD AUTO: 5 K/UL (ref 1.8–7.7)
NON-HDL CHOLESTEROL, 011976: 92 MG/DL
PLATELET # BLD AUTO: 213 K/UL (ref 140–440)
PMV BLD AUTO: 9.6 FL (ref 9–13)
POTASSIUM SERPL-SCNC: 4.1 MMOL/L (ref 3.5–5.5)
PROT SERPL-MCNC: 6.2 G/DL (ref 6.2–8.1)
RBC # BLD AUTO: 5.06 M/UL (ref 3.8–5.8)
SODIUM SERPL-SCNC: 142 MMOL/L (ref 133–145)
TRIGL SERPL-MCNC: 105 MG/DL (ref 40–149)
VLDLC SERPL CALC-MCNC: 21 MG/DL (ref 8–30)
WBC # BLD AUTO: 6.9 K/UL (ref 4–11)

## 2021-05-19 PROBLEM — E03.9 HYPOTHYROIDISM, ACQUIRED: Status: ACTIVE | Noted: 2021-05-19

## 2021-05-19 PROBLEM — M17.12 PRIMARY OSTEOARTHRITIS OF LEFT KNEE: Status: ACTIVE | Noted: 2021-02-26

## 2021-05-19 PROBLEM — J32.9 SINUSITIS: Status: ACTIVE | Noted: 2020-02-12

## 2021-05-19 PROBLEM — E78.00 PURE HYPERCHOLESTEROLEMIA: Status: ACTIVE | Noted: 2021-05-19

## 2021-05-19 PROBLEM — I82.409 DVT (DEEP VENOUS THROMBOSIS) (HCC): Status: ACTIVE | Noted: 2020-01-01

## 2021-05-19 PROBLEM — N32.89 IRRITABLE BLADDER: Status: ACTIVE | Noted: 2021-05-19

## 2022-02-22 ENCOUNTER — HOSPITAL ENCOUNTER (OUTPATIENT)
Dept: PHYSICAL THERAPY | Age: 77
Discharge: HOME | End: 2022-02-22
Payer: MEDICARE

## 2022-02-22 PROCEDURE — 97162 PT EVAL MOD COMPLEX 30 MIN: CPT

## 2022-02-22 PROCEDURE — 97110 THERAPEUTIC EXERCISES: CPT

## 2022-02-22 NOTE — PROGRESS NOTES
[address] [place] [address] PHYSICAL THERAPY   [facility], [facility], [facility] - Phone: [phone]  Fax: [phone] / [phone] [facility]  Patient Name: Shanae Mcgraw : 1945   Medical   Diagnosis: Left shoulder pain [M25.512] Treatment Diagnosis: Left shoulder pain [M25.512]   Onset Date: 2021     Referral Source: Elder Lerner MD [facility]): 2022   Prior Hospitalization: See medical history Provider #: [id number]   Prior Level of Function: Chronic h/o neck pain, able to reach overhead with less pain   Comorbidities: Prostate Cancer (04'), LBP, HTN, L TKR, R Knee pain    Medications: Verified on Patient Summary List   The Plan of Care and following information is based on the information from the initial evaluation.   ===========================================================================================  Assessment / key information: Patient is 64 y.o. male who presents to [address] [place] [address] PT at [facility], [facility]. with diagnosis of Left shoulder pain [M25.512]. Patient reports left shoulder sx began 2021 after overuse while moving. Reports going to ER 21 secondary to severe left shoulder pain. In addition, patient notes chronic h/o neck pain which patient thinks may be contributing to L shoulder pain. Imaging of the shoulder revealed inflammation per patient report. Shoulder pain is located along the left side of the neck and scapula and is described as an intermittent burning pain. Pt rates left shoulder pain  2/10 at the best, 3/10 currently, 10/10 at the worst. Shoulder pain increases with reaching overhead, lifting, turning head while driving, and sleeping.      Upon objective evaluation, patient demonstrates impaired and painful AROM/PROM of left shoulder, grossly impaired C/S AROM in all directions except forward flexion (C/S flexion 55 deg, extension 28 deg, R/L SB 26/17 deg,and  R/L Patient rode stationary bike for approximately five minutes, then used bathroom and had a bowel movement. Patient refused to get back on bike after. Successful lab draw in afternoon, no incidents of lunging at staff. Will continue to monitor for safety.   Rotation 65/57 deg), moderate FHP and T/S kyphosis, painful glenohumeral arc, and tenderness to palpation to left post RTC, pectorals, levator scapula. The following special tests were positive: Cross Arm and left Spurlings indicating probable cervical involvement. Patient scored 52/100 on FOTO indicating decreased function and quality of life. Pt can benefit from PT to decrease pain and TTP, increase left shoulder ROM, strength, flexibility, capsular mobility to improve functional ability. Shoulder AROM  Left   Flexion 136   Extension 65   Scaption//111   ER @ 0 Degrees 60   FIR T9/10                                                        Shoulder Strength:   L (1-5)   Flexors 5-/5   Abductors 4+/5   External Rotators 5-/5   Internal Rotators 5/5   Supraspinatus 5-/5     ===========================================================================================  Eval Complexity: History HIGH Complexity :3+ comorbidities / personal factors will impact the outcome/ POC ;  Examination  HIGH Complexity : 4+ Standardized tests and measures addressing body structure, function, activity limitation and / or participation in recreation ; Presentation MEDIUM Complexity : Evolving with changing characteristics ;   Decision Making MEDIUM Complexity : FOTO score of 26-74; Overall Complexity MEDIUM  Problem List: pain affecting function, decrease ROM, decrease strength, edema affecting function, decrease ADL/ functional abilities, decrease activity tolerance and decrease flexibility/ joint mobility   Treatment Plan may include any combination of the following: Therapeutic exercise, Therapeutic activities, Neuromuscular re-education, Physical agent/modality, Gait/balance training, Manual therapy, Patient education, Self Care training, Functional mobility training and Home safety training  Patient / Family readiness to learn indicated by: asking questions, trying to perform skills and interest  Persons(s) to be included in education: patient (P)  Barriers to Learning/Limitations: None  Measures taken, if barriers to learning:    Patient Goal (s):    Patient self reported health status: excellent  Rehabilitation Potential: good   Short Term Goals: To be accomplished in  2  weeks:  1. Pt performing HEP. 2.  Patient will report decreased c/o pain to < or = 7/10 to facilitate improved quality of sleep with manageable sx in the left shoulder.  Long Term Goals: To be accomplished in  5  weeks:  1. Pt independent in HEP. 2.  Increase score on FOTO to 65/100 indicating improved function. 3.  Patient will report decreased c/o pain to < or = 6/10 to facilitate improved quality of sleep with manageable sx in the left shoulder. 4.  Increase left Sh AROM in flexion to 150 deg to facilitate reaching into overhead cabinets. Frequency / Duration:   Patient to be seen  2  times per week for 5  weeks:  Patient / Caregiver education and instruction: self care, activity modification, brace/ splint application and exercises  Therapist Signature: Eric Mckeon Date: 7/16/7247   Certification Period: 2/22/2022 to 5/21/2021 Time: 9:12 AM   ===========================================================================================  I certify that the above Physical Therapy Services are being furnished while the patient is under my care. I agree with the treatment plan and certify that this therapy is necessary. Physician Signature:        Date:       Time:                                         Regan Dunham MD.   Please sign and return to [facility] at [facility], [facility]. or you may fax the signed copy to [phone]. Thank you.

## 2022-02-22 NOTE — PROGRESS NOTES
PHYSICAL THERAPY - DAILY TREATMENT NOTE    Patient Name: Rico Chau        Date: 2022  : 1945   Yes Patient  Verified  Visit #:   1   of   10  Insurance: Payor: Juan Carlos Davis / Plan: [place] MEDICARE PART A & B / Product Type: Medicare /      In time: 11:45 Out time: 12:28   Total Treatment Time: 43     Medicare/BCBS Time Tracking (below)   Total Timed Codes (min):  13 1:1 Treatment Time:  13     TREATMENT AREA =  Left shoulder pain [M25.512]    SUBJECTIVE  Pain Level (on 0 to 10 scale):  2-3   10   Medication Changes/New allergies or changes in medical history, any new surgeries or procedures?    no  If yes, update Summary List   Subjective Functional Status/Changes:  []  No changes reported     See POC         OBJECTIVE  Modalities Rationale:     13 min Therapeutic Exercise:  [x]  See flow sheet   Rationale:      increase ROM and increase strength to improve the patients ability to perform ADLs. Billed With/As:   [x] TE   [] TA   [] Neuro   [] Self Care Patient Education: [x] Review HEP    [x] Progressed/Changed HEP based on:   [x] positioning   [x] body mechanics   [] transfers   [] heat/ice application    [] other:      Other Objective/Functional Measures:    RRIL - incr pain in L arm to 7/10  RRIS - 3/10 pain - pain felt in L shoulder   Post Treatment Pain Level (on 0 to 10) scale:   6  / 10     ASSESSMENT  Assessment/Changes in Function:     See POC     []  See Progress Note/Recertification   Patient will continue to benefit from skilled PT services to modify and progress therapeutic interventions, address functional mobility deficits, address ROM deficits, address strength deficits, analyze and address soft tissue restrictions, analyze and cue movement patterns, analyze and modify body mechanics/ergonomics, assess and modify postural abnormalities and instruct in home and community integration to attain remaining goals.    Progress toward goals / Updated goals:    See newly established goals in [id number] Rae #[id number]  [x]  Upgrade activities as tolerated yes Continue plan of care   []  Discharge due to :    []  Other:      Therapist: Hussain Macias    Date: 2/22/2022 Time: 9:12 AM     Future Appointments   Date Time Provider Lisset Chawla   2/22/2022 11:45 AM [address] [facility]   1/11/2023 10:30 AM [address] [place] [address]   1/11/2023 11:00 AM Rosana Joyce, [address]

## 2022-03-08 NOTE — PROGRESS NOTES
PHYSICAL THERAPY - DAILY TREATMENT NOTE    Patient Name: Mirella Rubi        Date: 3/9/2022  : 1945   yes Patient  Verified  Visit #:      10  Insurance: Payor: Cory Altamirano / Plan: [place] MEDICARE PART A & B / Product Type: Medicare /      In time: 2:00 Out time: 2:43   Total Treatment Time: 43     Medicare/BCBS Time Tracking (below)   Total Timed Codes (min):  43 1:1 Treatment Time:  43     TREATMENT AREA =  Left shoulder pain [M25.512]    SUBJECTIVE  Pain Level (on 0 to 10 scale):  1  / 10   Medication Changes/New allergies or changes in medical history, any new surgeries or procedures?    no  If yes, update Summary List   Subjective Functional Status/Changes:  []  No changes reported     I forgot my homework  - did remember the one in the corner and trying to walk straighter and work on posture         OBJECTIVE  Modalities Rationale:     decrease pain and increase tissue extensibility to improve patient's ability to perform ADLs   min [] Estim, type/location:                                      []  att     []  unatt     []  w/US     []  w/ice    []  w/heat    min []  Mechanical Traction: type/lbs                   []  pro   []  sup   []  int   []  cont    []  before manual    []  after manual    min []  Ultrasound, settings/location:      min []  Iontophoresis w/ dexamethasone, location:                                               []  take home patch       []  in clinic   NT min []  Ice     []  Heat    location/position: Neck supine with wedge    min []  Vasopneumatic Device, press/temp:     min []  Other:    [x] Skin assessment post-treatment (if applicable):    [x]  intact    []  redness- no adverse reaction     []redness  adverse reaction:        34 min Therapeutic Exercise:  [x]  See flow sheet   Rationale:      increase ROM and increase strength to improve the patients ability to perform ADLs     9 min Manual Therapy: STM to L C/S paraspinals, upper trapezius, levator scapula, and parascapular mms   Rationale:      decrease pain, increase ROM, increase tissue extensibility, decrease trigger points and increase postural awareness to improve patient's ability to perform household chores  The manual therapy interventions were performed at a separate and distinct time from the therapeutic activities interventions. Billed With/As:   [x] TE   [] TA   [] Neuro   [] Self Care Patient Education: [x] Review HEP    [x] Progressed/Changed HEP based on:   [] positioning   [x] body mechanics   [] transfers   [] heat/ice application    [] other:      Other Objective/Functional Measures: Added sitting T/S extension with good pt tolerance and no complaints of sx. Reports \"that's stretching something\" with performance of T/S ext  R shoulder flexion 130   RRFIS peripheralized sx, RRIS with op and C/S extension peripheralized sx  Possible benefit with L SB however, when attempted in supine patient became very nauseous and unable to tolerate flat lying which MBR to inner ear pathology  Pt presented with increased TTP and mm tone to levator scap muscle. Post Treatment Pain Level (on 0 to 10) scale:   2  / 10     ASSESSMENT  Assessment/Changes in Function:     Repeated Cervical flexion and cervical retraction/extension peripheralized sx. Reports nausea subsided by end of session - wife present to drive  home. Patient would benefit from emphasis on CT jxn/upper T/S as repeated cervical movements were not effective. []  See Progress Note/Recertification   Patient will continue to benefit from skilled PT services to modify and progress therapeutic interventions, address functional mobility deficits, address ROM deficits, address strength deficits, analyze and address soft tissue restrictions, analyze and cue movement patterns, analyze and modify body mechanics/ergonomics, assess and modify postural abnormalities and instruct in home and community integration to attain remaining goals. Progress toward goals / Updated goals:    First visit after initial evaluation. Progress tx per POC.         PLAN  [x]  Upgrade activities as tolerated yes Continue plan of care   []  Discharge due to :    []  Other:      Therapist: Ryan Shepherd    Date: 3/9/2022 Time: 2:01 PM     Future Appointments   Date Time Provider Lisset Chawla   3/9/2022  2:00 PM Jesenia Blight [facility]   3/14/2022 10:30 AM Jesenia Blight [facility]   3/16/2022  2:45 PM Jesenia Blight [facility]   3/21/2022  3:45 PM Jesenia Blight [facility]   3/23/2022 11:15 AM Jesenia Blight [facility]   3/28/2022  3:45 PM Jesenia Blight [facility]   3/30/2022  2:45 PM Danita Reece [facility]   4/4/2022  2:00 PM Jesenia Blight [facility]   4/6/2022  2:00 PM Jesenia Blight [facility]   1/11/2023 10:30 AM [facility] SCHED   1/11/2023 11:00 AM David Can

## 2022-03-09 ENCOUNTER — HOSPITAL ENCOUNTER (OUTPATIENT)
Dept: PHYSICAL THERAPY | Age: 77
Discharge: HOME | End: 2022-03-09
Payer: MEDICARE

## 2022-03-09 PROCEDURE — 97140 MANUAL THERAPY 1/> REGIONS: CPT

## 2022-03-09 PROCEDURE — 97110 THERAPEUTIC EXERCISES: CPT

## 2022-03-10 NOTE — PROGRESS NOTES
PHYSICAL THERAPY - DAILY TREATMENT NOTE    Patient Name: Samantha Snow        Date: 3/14/2022  : 1945   yes Patient  Verified  Visit #:   3   of   10  Insurance: Payor: Dheeraj Fine / Plan: [place] MEDICARE PART A & B / Product Type: Medicare /      In time: 10:31 Out time: 11:14   Total Treatment Time: 43     Medicare/BCBS Time Tracking (below)   Total Timed Codes (min):  43 1:1 Treatment Time: 43     TREATMENT AREA =  Left shoulder pain [M25.512]    SUBJECTIVE  Pain Level (on 0 to 10 scale):  1  / 10   Medication Changes/New allergies or changes in medical history, any new surgeries or procedures?    no  If yes, update Summary List   Subjective Functional Status/Changes:  []  No changes reported     Wiped out from grand kids. Doing good with HEP. Maybe a 1 in the same spot. Had another incidence of the nausea with laying in supine.  Reports consciously standing and sitting straighter         OBJECTIVE  Modalities Rationale:     decrease pain and increase tissue extensibility to improve patient's ability to perform ADLs   min [] Estim, type/location:                                      []  att     []  unatt     []  w/US     []  w/ice    []  w/heat    min []  Mechanical Traction: type/lbs                   []  pro   []  sup   []  int   []  cont    []  before manual    []  after manual    min []  Ultrasound, settings/location:      min []  Iontophoresis w/ dexamethasone, location:                                               []  take home patch       []  in clinic   NT min []  Ice     []  Heat    location/position: Neck supine with wedge    min []  Vasopneumatic Device, press/temp:     min []  Other:    [x] Skin assessment post-treatment (if applicable):    [x]  intact    []  redness- no adverse reaction     []redness  adverse reaction:        28 min Therapeutic Exercise:  [x]  See flow sheet   Rationale:      increase ROM and increase strength to improve the patients ability to perform ADLs     15 min Manual Therapy: STM to L C/S paraspinals, upper trapezius, levator scapula, and parascapular mms, prone upper to mid T/S CPA/TPA mobs   Rationale:     decrease pain, increase ROM, increase tissue extensibility, decrease trigger points and increase postural awareness to improve patient's ability to perform household chores  The manual therapy interventions were performed at a separate and distinct time from the therapeutic activities interventions. Billed With/As:   [x] TE   [] TA   [] Neuro   [] Self Care Patient Education: [x] Review HEP    [x] Progressed/Changed HEP based on:   [] positioning   [x] body mechanics   [] transfers   [] heat/ice application    [] other:      Other Objective/Functional Measures: Added T/S rotation standing    Discussed calling PCP about nausea with lying in supine - education in orthostatic hypotn or BPPV  Moderate L upper arm pain with prone scap retraction     Post Treatment Pain Level (on 0 to 10) scale:   1  / 10     ASSESSMENT  Assessment/Changes in Function:     Possible TOS, Cervical, or thoracic involvement. Cuing for activity or repetition modification based on L UE sx. Issued updated HEP. []  See Progress Note/Recertification   Patient will continue to benefit from skilled PT services to modify and progress therapeutic interventions, address functional mobility deficits, address ROM deficits, address strength deficits, analyze and address soft tissue restrictions, analyze and cue movement patterns, analyze and modify body mechanics/ergonomics, assess and modify postural abnormalities and instruct in home and community integration to attain remaining goals. Progress toward goals / Updated goals:    1. Pt performing HEP. Goal in progress - Reports compliance with HEP (3/14/2022)  2. Patient will report decreased c/o pain to < or = 7/10 to facilitate improved quality of sleep with manageable sx in the left shoulder.  Goal in progress 4/10 pain at the worst (3/14/2022)       PLAN  [x]  Upgrade activities as tolerated yes Continue plan of care   []  Discharge due to :    []  Other:      Therapist: Olivier Echevarria    Date: 3/14/2022 Time: 2:01 PM     Future Appointments   Date Time Provider Lisset Chawla   3/14/2022 10:30 AM Rosie Lane [facility]   3/16/2022  2:45 PM Rosie Aldaron [facility]   3/21/2022  3:45 PM Rosie Patron [facility]   3/23/2022 11:15 AM Rosie Lane [facility]   3/28/2022  3:45 PM Rosie Lane [facility]   3/30/2022  2:45 PM Elizabeth Reece [facility]   4/4/2022  2:00 PM Rosie Lane [facility]   4/6/2022  2:00 PM Rosie Lane [facility]   1/11/2023 10:30 AM [facility]   1/11/2023 11:00 AM Estefanía Fletcher, [address]

## 2022-03-14 ENCOUNTER — TELEPHONE (OUTPATIENT)
Dept: PHYSICAL THERAPY | Age: 77
End: 2022-03-14

## 2022-03-14 ENCOUNTER — HOSPITAL ENCOUNTER (OUTPATIENT)
Dept: PHYSICAL THERAPY | Age: 77
Discharge: HOME | End: 2022-03-14
Payer: MEDICARE

## 2022-03-14 PROCEDURE — 97140 MANUAL THERAPY 1/> REGIONS: CPT

## 2022-03-14 PROCEDURE — 97110 THERAPEUTIC EXERCISES: CPT

## 2022-03-16 ENCOUNTER — HOSPITAL ENCOUNTER (OUTPATIENT)
Dept: PHYSICAL THERAPY | Age: 77
Discharge: HOME | End: 2022-03-16
Payer: MEDICARE

## 2022-03-16 PROCEDURE — 97140 MANUAL THERAPY 1/> REGIONS: CPT

## 2022-03-16 PROCEDURE — 97110 THERAPEUTIC EXERCISES: CPT

## 2022-03-18 PROBLEM — K65.4 MESENTERIC PANNICULITIS (HCC): Status: ACTIVE | Noted: 2020-05-06

## 2022-03-18 PROBLEM — N32.89 IRRITABLE BLADDER: Status: ACTIVE | Noted: 2021-05-19

## 2022-03-18 PROBLEM — E03.9 HYPOTHYROIDISM, ACQUIRED: Status: ACTIVE | Noted: 2021-05-19

## 2022-03-18 PROBLEM — Z90.79 HISTORY OF RADICAL PROSTATECTOMY: Status: ACTIVE | Noted: 2017-04-03

## 2022-03-18 PROBLEM — N52.9 ERECTILE DYSFUNCTION: Status: ACTIVE | Noted: 2018-03-19

## 2022-03-19 PROBLEM — E04.1 THYROID NODULE: Status: ACTIVE | Noted: 2020-06-10

## 2022-03-19 PROBLEM — I82.409 DVT (DEEP VENOUS THROMBOSIS) (HCC): Status: ACTIVE | Noted: 2020-01-01

## 2022-03-19 PROBLEM — M17.12 PRIMARY OSTEOARTHRITIS OF LEFT KNEE: Status: ACTIVE | Noted: 2021-02-26

## 2022-03-19 PROBLEM — K21.00 REFLUX ESOPHAGITIS: Status: ACTIVE | Noted: 2018-01-02

## 2022-03-19 PROBLEM — D17.9 ANGIOMYOLIPOMA: Status: ACTIVE | Noted: 2020-02-26

## 2022-03-19 PROBLEM — E78.00 PURE HYPERCHOLESTEROLEMIA: Status: ACTIVE | Noted: 2021-05-19

## 2022-03-19 PROBLEM — I82.462 ACUTE DEEP VEIN THROMBOSIS (DVT) OF CALF MUSCLE VEIN OF LEFT LOWER EXTREMITY (HCC): Status: ACTIVE | Noted: 2020-06-10

## 2022-03-19 PROBLEM — Z85.46 HISTORY OF PROSTATE CANCER: Status: ACTIVE | Noted: 2017-04-03

## 2022-03-20 PROBLEM — K42.9 UMBILICAL HERNIA WITHOUT OBSTRUCTION AND WITHOUT GANGRENE: Status: ACTIVE | Noted: 2017-10-18

## 2022-03-20 PROBLEM — J32.9 SINUSITIS: Status: ACTIVE | Noted: 2020-02-12

## 2022-03-21 ENCOUNTER — HOSPITAL ENCOUNTER (OUTPATIENT)
Dept: PHYSICAL THERAPY | Age: 77
End: 2022-03-21
Payer: MEDICARE

## 2022-03-28 ENCOUNTER — HOSPITAL ENCOUNTER (OUTPATIENT)
Dept: PHYSICAL THERAPY | Age: 77
Discharge: HOME | End: 2022-03-28
Payer: MEDICARE

## 2022-03-28 PROCEDURE — 97110 THERAPEUTIC EXERCISES: CPT

## 2022-03-28 PROCEDURE — 97140 MANUAL THERAPY 1/> REGIONS: CPT

## 2022-03-28 NOTE — PROGRESS NOTES
PHYSICAL THERAPY - DAILY TREATMENT NOTE    Patient Name: Ole Morales        Date: 3/28/2022  : 1945   yes Patient  Verified  Visit #:      of   10  Insurance: Payor: Aaron Bonilla / Plan: [place] MEDICARE PART A & B / Product Type: Medicare /      In time: 3:48 Out time: 4:26   Total Treatment Time: 38     Medicare/BCBS Time Tracking (below)   Total Timed Codes (min):  38 1:1 Treatment Time: 38     TREATMENT AREA =  Left shoulder pain [M25.512]    SUBJECTIVE  Pain Level (on 0 to 10 scale):  2  / 10   Medication Changes/New allergies or changes in medical history, any new surgeries or procedures?    no  If yes, update Summary List   Subjective Functional Status/Changes:  []  No changes reported     Sorry I missed a session my wife had a kidney stone. A little ache in the neck.  Actually getting better         OBJECTIVE  Modalities Rationale:     decrease pain and increase tissue extensibility to improve patient's ability to perform ADLs   min [] Estim, type/location:                                      []  att     []  unatt     []  w/US     []  w/ice    []  w/heat    min []  Mechanical Traction: type/lbs                   []  pro   []  sup   []  int   []  cont    []  before manual    []  after manual    min []  Ultrasound, settings/location:      min []  Iontophoresis w/ dexamethasone, location:                                               []  take home patch       []  in clinic   NT min []  Ice     []  Heat    location/position: Neck supine with wedge    min []  Vasopneumatic Device, press/temp:     min []  Other:    [x] Skin assessment post-treatment (if applicable):    [x]  intact    []  redness- no adverse reaction     []redness - adverse reaction:        24 min Therapeutic Exercise:  [x]  See flow sheet   Rationale:      increase ROM and increase strength to improve the patients ability to perform ADLs     14 min Manual Therapy: STM to L SCM, C/S paraspinals, upper trapezius, levator scapula, and parascapular mms, prone upper to mid T/S CPA/TPA mobs, L pec release, STM LHB   Rationale:     decrease pain, increase ROM, increase tissue extensibility, decrease trigger points and increase postural awareness to improve patient's ability to perform household chores  The manual therapy interventions were performed at a separate and distinct time from the therapeutic activities interventions. Billed With/As:   [x] TE   [] TA   [] Neuro   [] Self Care Patient Education: [x] Review HEP    [x] Progressed/Changed HEP based on:   [] positioning   [x] body mechanics   [] transfers   [] heat/ice application    [] other:      Other Objective/Functional Measures:    See PN      Post Treatment Pain Level (on 0 to 10) scale:   1  / 10     ASSESSMENT  Assessment/Changes in Function:     See PN      []  See Progress Note/Recertification   Patient will continue to benefit from skilled PT services to modify and progress therapeutic interventions, address functional mobility deficits, address ROM deficits, address strength deficits, analyze and address soft tissue restrictions, analyze and cue movement patterns, analyze and modify body mechanics/ergonomics, assess and modify postural abnormalities and instruct in home and community integration to attain remaining goals.    Progress toward goals / Updated goals:    See PN     PLAN  [x]  Upgrade activities as tolerated yes Continue plan of care   []  Discharge due to :    []  Other:      Therapist: Sneha Schuster    Date: 3/28/2022 Time: 2:01 PM     Future Appointments   Date Time Provider Lisset Chawla   3/28/2022  3:45 PM Malu V[facility]   3/30/2022  2:45 PM Emeka Reece [facility]   4/4/2022  2:00 PM Malu Vieyra [facility]   4/6/2022  2:00 PM Malu [facility]rell [facility]   1/11/2023 10:30 AM [facility] [address]   1/11/2023 11:00 AM Gia Cueva, [address] [place] [address]

## 2022-03-28 NOTE — PROGRESS NOTES
[address], [facility] [address],[facility], [address] [facility], [place] - Phone: [phone]  Fax: [phone] OF CARE/RECERTIFICATION FOR PHYSICAL THERAPY          Patient Name: Pepe Dunham : 1945   Treatment/Medical Diagnosis: Left shoulder pain [M25.512]   Onset Date: 2021    Referral Source: Sourav Orourke MD Start of Care [facility]): 2022   Prior Hospitalization: See Medical History Provider #: [id number]   Prior Level of Function: Chronic h/o neck pain, able to reach overhead with less pain   Comorbidities: Prostate Cancer (04'), LBP, HTN, L TKR, R Knee pain    Medications: Verified on Patient Summary List   Visits from [facility]: 5 Missed Visits: 0     Goal/Measure of Progress Goal Met? 1. Pt performing HEP. Status at last Eval: Issued HEP Current Status: Intermittent compliance with HEP  Progressing   2. Patient will report decreased c/o pain to < or = 7/10 to facilitate improved quality of sleep with manageable sx in the left shoulder. Status at last Eval: 10/10 pain at the worst Current Status: 7-8/10 pain at the worst  Progressing     Key Functional Changes/Progress: The pt has progressed well with therapy, consistently reporting decreased pain and increased functional ability. Currently, the patient's main complaint is ache located along the top of the left shoulder/neck. Currently, pt reports 60% improvement in the left shoulder sx since IE. Average L shoulder pain is rated at 2/10 and  7-8/10 at the worst. L Shoulder AROM is as follows: flexion 154 deg, scaption 152 deg, ER @ 0 deg ABD 75 deg, and FIR T10/11 deg. Pt reports improved ability to turn head since starting PT services. Pt would benefit from continued PT services in order to decrease left shoulder and neck pain and TTP, improve ROM, strength, flexibility, balance, joint mobility and address remaining impairments.    Problem List: pain affecting function, decrease ROM, decrease strength, edema affecting function, impaired gait/ balance, decrease ADL/ functional abilitiies, decrease activity tolerance, decrease flexibility/ joint mobility and decrease transfer abilities   Treatment Plan may include any combination of the following: Therapeutic exercise, Therapeutic activities, Neuromuscular re-education, Physical agent/modality, Gait/balance training, Manual therapy, Aquatic therapy, Patient education, Self Care training, Functional mobility training, Home safety training and Stair training  Patient Goal(s) has been updated and includes:  \"regain mobility\"    Goals for this certification period include and are to be achieved in   4  weeks:  1. Pt independent in HEP. 2.  Increase score on FOTO to 65/100 indicating improved function. 3.  Patient will report decreased c/o pain to < or = 6/10 to facilitate improved quality of sleep with manageable sx in the left shoulder. 4.  Increase left Sh AROM in flexion to 150 deg to facilitate reaching into overhead cabinets. Frequency / Duration:   Patient to be seen   2   times per week for   4    weeks:    Assessments/Recommendations: Continue therapy with the following recommendations: 2x per week for 4 weeks  If you have any questions/comments please contact us directly at [phone]. Thank you for allowing us to assist in the care of your patient.     Therapist Signature: Margaret Reece Date: 7/91/6055   Certification Period:  Reporting Period: 2/22/2022 to 5/21/2021 2/22/2022 to3/28/2022 Time: 9:51 AM   NOTE TO PHYSICIAN:  PLEASE COMPLETE THE ORDERS BELOW AND FAX TO   [facility]: [phone]  If you are unable to process this request in 24 hours please contact our office: [phone]    ___ I have read the above report and request that my patient continue as recommended.   ___ I have read the above report and request that my patient continue therapy with the following changes/special instructions: ________________________________________________   ___ I have read the above report and request that my patient be discharged from therapy.      Physician Signature:        Date:       Time:                                        Cooper Hernández MD

## 2022-03-30 ENCOUNTER — HOSPITAL ENCOUNTER (OUTPATIENT)
Dept: PHYSICAL THERAPY | Age: 77
Discharge: HOME | End: 2022-03-30
Payer: MEDICARE

## 2022-03-30 PROCEDURE — 97140 MANUAL THERAPY 1/> REGIONS: CPT

## 2022-03-30 PROCEDURE — 97110 THERAPEUTIC EXERCISES: CPT

## 2022-03-30 NOTE — PROGRESS NOTES
PHYSICAL THERAPY - DAILY TREATMENT NOTE    Patient Name: Kimberly Villagomez        Date: 3/30/2022  : 1945   yes Patient  Verified  Visit #:   6   of   10  Insurance: Payor: Bonifacio Solomon / Plan: [place] MEDICARE PART A & B / Product Type: Medicare /      In time: 2:46 Out time: 3:28   Total Treatment Time: 42     Medicare/BCBS Time Tracking (below)   Total Timed Codes (min):  42 1:1 Treatment Time: 42     TREATMENT AREA =  Left shoulder pain [M25.512]    SUBJECTIVE  Pain Level (on 0 to 10 scale):  1  / 10   Medication Changes/New allergies or changes in medical history, any new surgeries or procedures?    no  If yes, update Summary List   Subjective Functional Status/Changes:  []  No changes reported     Woke up nauseous, reports only an ache today not much doesn't radiate into the shoulder as often as before.  Notes left TKR in April          OBJECTIVE  Modalities Rationale:     decrease pain and increase tissue extensibility to improve patient's ability to perform ADLs   min [] Estim, type/location:                                      []  att     []  unatt     []  w/US     []  w/ice    []  w/heat    min []  Mechanical Traction: type/lbs                   []  pro   []  sup   []  int   []  cont    []  before manual    []  after manual    min []  Ultrasound, settings/location:      min []  Iontophoresis w/ dexamethasone, location:                                               []  take home patch       []  in clinic   NT min []  Ice     []  Heat    location/position: Neck supine with wedge    min []  Vasopneumatic Device, press/temp:     min []  Other:    [x] Skin assessment post-treatment (if applicable):    [x]  intact    []  redness- no adverse reaction     []redness - adverse reaction:        26 min Therapeutic Exercise:  [x]  See flow sheet   Rationale:      increase ROM and increase strength to improve the patients ability to perform ADLs     16 min Manual Therapy: STM to L SCM, C/S paraspinals, scalenes, upper trapezius, levator scapula, and parascapular mms, L pec release, STM L subclavius   Rationale:     decrease pain, increase ROM, increase tissue extensibility, decrease trigger points and increase postural awareness to improve patient's ability to perform household chores  The manual therapy interventions were performed at a separate and distinct time from the therapeutic activities interventions. Billed With/As:   [x] TE   [] TA   [] Neuro   [] Self Care Patient Education: [x] Review HEP    [x] Progressed/Changed HEP based on:   [] positioning   [x] body mechanics   [] transfers   [] heat/ice application    [] other:      Other Objective/Functional Measures:    Marked TTP and mm tone to left subclavius  Added UBE  Cuing for set up with standing T/S rotation      Post Treatment Pain Level (on 0 to 10) scale:   1  / 10     ASSESSMENT  Assessment/Changes in Function:     Manual focused on anterior chest hypertonicity. Progressed repetitions of TB parascapular and RTC strengthening therex with good tolerance \"I feel better than when I came in\". []  See Progress Note/Recertification   Patient will continue to benefit from skilled PT services to modify and progress therapeutic interventions, address functional mobility deficits, address ROM deficits, address strength deficits, analyze and address soft tissue restrictions, analyze and cue movement patterns, analyze and modify body mechanics/ergonomics, assess and modify postural abnormalities and instruct in home and community integration to attain remaining goals. Progress toward goals / Updated goals:    2. Patient will report decreased c/o pain to < or = 7/10 to facilitate improved quality of sleep with manageable sx in the left shoulder.  Goal Met 5/10 pain at the worst - \"I havent had any pain anymore where I cant move my arm\" (3/30/22)     PLAN  [x]  Upgrade activities as tolerated yes Continue plan of care   []  Discharge due to :    []  Other: Therapist: Dayna Reece    Date: 3/30/2022 Time: 2:01 PM     Future Appointments   Date Time Provider Lisset Chawla   3/30/2022  2:45 PM Corrinne Galt [facility]   4/4/2022  2:00 PM Corrinne Galt [facility]   4/6/2022  2:00 PM Corrinne Galt [facility]   1/11/2023 10:30 AM [facility]   1/11/2023 11:00 AM David Bills 25

## 2022-03-31 NOTE — PROGRESS NOTES
PHYSICAL THERAPY - DAILY TREATMENT NOTE    Patient Name: Benji Pruitt        Date: 2022  : 1945   yes Patient  Verified  Visit #:   7   of   10  Insurance: Payor: Emili Michael / Plan: [place] MEDICARE PART A & B / Product Type: Medicare /      In time: 2:12 Out time: 2:51   Total Treatment Time: 39     Medicare/BCBS Time Tracking (below)   Total Timed Codes (min):  39 1:1 Treatment Time: 39     TREATMENT AREA =  Left shoulder pain [M25.512]    SUBJECTIVE  Pain Level (on 0 to 10 scale):  5  / 10   Medication Changes/New allergies or changes in medical history, any new surgeries or procedures?    no  If yes, update Summary List   Subjective Functional Status/Changes:  []  No changes reported     Working on the house and probably overdid it - ache in th eback of the back          OBJECTIVE  Modalities Rationale:     decrease pain and increase tissue extensibility to improve patient's ability to perform ADLs   min [] Estim, type/location:                                      []  att     []  unatt     []  w/US     []  w/ice    []  w/heat    min []  Mechanical Traction: type/lbs                   []  pro   []  sup   []  int   []  cont    []  before manual    []  after manual    min []  Ultrasound, settings/location:      min []  Iontophoresis w/ dexamethasone, location:                                               []  take home patch       []  in clinic   NT min []  Ice     []  Heat    location/position: Neck supine with wedge    min []  Vasopneumatic Device, press/temp:     min []  Other:    [x] Skin assessment post-treatment (if applicable):    [x]  intact    []  redness- no adverse reaction     []redness - adverse reaction:        27 min Therapeutic Exercise:  [x]  See flow sheet   Rationale:      increase ROM and increase strength to improve the patients ability to perform ADLs     12 min Manual Therapy: STM to L SCM, C/S paraspinals, scalenes, upper trapezius, levator scapula, and parascapular mms, L pec release, STM L subclavius   Rationale:     decrease pain, increase ROM, increase tissue extensibility, decrease trigger points and increase postural awareness to improve patient's ability to perform household chores  The manual therapy interventions were performed at a separate and distinct time from the therapeutic activities interventions. Billed With/As:   [x] TE   [] TA   [] Neuro   [] Self Care Patient Education: [x] Review HEP    [x] Progressed/Changed HEP based on:   [] positioning   [x] body mechanics   [] transfers   [] heat/ice application    [] other:      Other Objective/Functional Measures:    Continued with flow sheet   Continued mm hypertonicity to L pec and subclavius     Post Treatment Pain Level (on 0 to 10) scale:   1-2  / 10     ASSESSMENT  Assessment/Changes in Function:     Patient has progressed well with PT services reporting decreased pain and incr functional mobility - secondary to upcoming L TKR discussed decr frequency to 1x per week in order to address remaining impairments. []  See Progress Note/Recertification   Patient will continue to benefit from skilled PT services to modify and progress therapeutic interventions, address functional mobility deficits, address ROM deficits, address strength deficits, analyze and address soft tissue restrictions, analyze and cue movement patterns, analyze and modify body mechanics/ergonomics, assess and modify postural abnormalities and instruct in home and community integration to attain remaining goals. Progress toward goals / Updated goals:    2. Patient will report decreased c/o pain to < or = 7/10 to facilitate improved quality of sleep with manageable sx in the left shoulder.  Goal Met 7-8/10 pain at the worst - posterior neck and left side of the neck (4/4/22) - \"left shoulder pain has gone away\"     PLAN  [x]  Upgrade activities as tolerated yes Continue plan of care   []  Discharge due to :    []  Other:      Therapist: Cristina Wheat [place]    Date: 4/4/2022 Time: 2:01 PM     Future Appointments   Date Time Provider Lisset Chawla   4/4/2022  2:00 PM Gregory Pardo [facility]   4/6/2022  2:00 PM Gregory Pardo [facility]   1/11/2023 10:30 AM [facility]TRASOUND [facility] HARESH SCHED   1/11/2023 11:00 AM David Proctor 27

## 2022-04-04 ENCOUNTER — HOSPITAL ENCOUNTER (OUTPATIENT)
Dept: PHYSICAL THERAPY | Age: 77
Discharge: HOME | End: 2022-04-04
Payer: MEDICARE

## 2022-04-04 PROCEDURE — 97140 MANUAL THERAPY 1/> REGIONS: CPT

## 2022-04-04 PROCEDURE — 97110 THERAPEUTIC EXERCISES: CPT

## 2022-04-06 ENCOUNTER — HOSPITAL ENCOUNTER (OUTPATIENT)
Dept: PHYSICAL THERAPY | Age: 77
Discharge: HOME | End: 2022-04-06
Payer: MEDICARE

## 2022-04-06 PROCEDURE — 97110 THERAPEUTIC EXERCISES: CPT

## 2022-04-06 PROCEDURE — 97140 MANUAL THERAPY 1/> REGIONS: CPT

## 2022-04-06 NOTE — PROGRESS NOTES
PHYSICAL THERAPY - DAILY TREATMENT NOTE    Patient Name: Jordana Castañeda        Date: 2022  : 1945   yes Patient  Verified  Visit #:   8   of   10  Insurance: Payor: Ana Zimmer / Plan: VA MEDICARE PART A & B / Product Type: Medicare /      In time: 1:57 Out time: 2:35   Total Treatment Time: 38     Medicare/BCBS Time Tracking (below)   Total Timed Codes (min):  38 1:1 Treatment Time: 38     TREATMENT AREA =  Left shoulder pain [M25.512]    SUBJECTIVE  Pain Level (on 0 to 10 scale):  3  / 10   Medication Changes/New allergies or changes in medical history, any new surgeries or procedures?    no  If yes, update Summary List   Subjective Functional Status/Changes:  []  No changes reported     Worked on a work bench with a neighbor - notes soreness after doing that       OBJECTIVE  Modalities Rationale:     decrease pain and increase tissue extensibility to improve patient's ability to perform ADLs   min [] Estim, type/location:                                      []  att     []  unatt     []  w/US     []  w/ice    []  w/heat    min []  Mechanical Traction: type/lbs                   []  pro   []  sup   []  int   []  cont    []  before manual    []  after manual    min []  Ultrasound, settings/location:      min []  Iontophoresis w/ dexamethasone, location:                                               []  take home patch       []  in clinic   NT min []  Ice     []  Heat    location/position: Neck supine with wedge    min []  Vasopneumatic Device, press/temp:     min []  Other:    [x] Skin assessment post-treatment (if applicable):    [x]  intact    []  redness- no adverse reaction     []redness - adverse reaction:        28 min Therapeutic Exercise:  [x]  See flow sheet   Rationale:      increase ROM and increase strength to improve the patients ability to perform ADLs     10 min Manual Therapy: STM to L SCM, C/S paraspinals, scalenes, upper trapezius, levator scapula, and parascapular mms, L pec release, STM L subclavius   Rationale:     decrease pain, increase ROM, increase tissue extensibility, decrease trigger points and increase postural awareness to improve patient's ability to perform household chores  The manual therapy interventions were performed at a separate and distinct time from the therapeutic activities interventions. Billed With/As:   [x] TE   [] TA   [] Neuro   [] Self Care Patient Education: [x] Review HEP    [x] Progressed/Changed HEP based on:   [] positioning   [x] body mechanics   [] transfers   [] heat/ice application    [] other:      Other Objective/Functional Measures:    Slow improvements in L pec and UT mm tone   Progressed sitting FR T/S extension to full FR  Advanced horiz ABD to GTB  Added 1/2 prone T     Post Treatment Pain Level (on 0 to 10) scale:    2/ 10     ASSESSMENT  Assessment/Changes in Function:     Patient reporting moderate pain with L 1/2 prone T over wedge. Fair tolerance for progressions in parascapular strengthening. []  See Progress Note/Recertification   Patient will continue to benefit from skilled PT services to modify and progress therapeutic interventions, address functional mobility deficits, address ROM deficits, address strength deficits, analyze and address soft tissue restrictions, analyze and cue movement patterns, analyze and modify body mechanics/ergonomics, assess and modify postural abnormalities and instruct in home and community integration to attain remaining goals. Progress toward goals / Updated goals:    2. Patient will report decreased c/o pain to < or = 7/10 to facilitate improved quality of sleep with manageable sx in the left shoulder.  Goal Met 7-8/10 pain at the worst - posterior neck and left side of the neck (4/4/22) - \"left shoulder pain has gone away\"     PLAN  [x]  Upgrade activities as tolerated yes Continue plan of care   []  Discharge due to :    []  Other:      Therapist: Anamika Cabrera    Date: 4/6/2022 Time: 2:01 PM     Future Appointments   Date Time Provider Lisset Chawla   [facility]2022  [facility]   4/20/2022  2:45 PM Almas Reece [facility]   1/11/2023 10:30 AM [facility]TR[facility] HARESH SCHED   1/11/2023 11:00 AM David Crowley

## 2022-04-20 ENCOUNTER — APPOINTMENT (OUTPATIENT)
Dept: PHYSICAL THERAPY | Age: 77
End: 2022-04-20
Payer: MEDICARE

## 2022-05-12 ENCOUNTER — HOSPITAL ENCOUNTER (OUTPATIENT)
Dept: PHYSICAL THERAPY | Age: 77
Discharge: HOME | End: 2022-05-12
Payer: MEDICARE

## 2022-05-12 PROCEDURE — 97162 PT EVAL MOD COMPLEX 30 MIN: CPT

## 2022-05-12 NOTE — PROGRESS NOTES
PHYSICAL THERAPY - DAILY TREATMENT NOTE    Patient Name: Shen Flores        Date: 2022  : 1945   Yes Patient  Verified  Visit #:      of   15  Insurance: Payor: VA MEDICARE / Plan: VA MEDICARE PART A & B / Product Type: Medicare /      In time: 12:01 Out time: 12:34   Total Treatment Time: 33     Medicare/BCBS Time Tracking (below)   Total Timed Codes (min):  00 1:1 Treatment Time:  33     TREATMENT AREA =  Right knee pain [M25.561]    SUBJECTIVE  Pain Level (on 0 to 10 scale):  3-4  / 10   Medication Changes/New allergies or changes in medical history, any new surgeries or procedures?    no  If yes, update Summary List   Subjective Functional Status/Changes:  []  No changes reported     See POC         OBJECTIVE  Modalities Rationale:     Billed With/As:   [x] TE   [] TA   [] Neuro   [] Self Care Patient Education: [x] Review HEP    [x] Progressed/Changed HEP based on:   [x] positioning   [x] body mechanics   [] transfers   [] heat/ice application    [] other:      Other Objective/Functional Measures:    See POC     Post Treatment Pain Level (on 0 to 10) scale:   3-4  / 10     ASSESSMENT  Assessment/Changes in Function:     See POC     []  See Progress Note/Recertification   Patient will continue to benefit from skilled PT services to modify and progress therapeutic interventions, address functional mobility deficits, address ROM deficits, address strength deficits, analyze and address soft tissue restrictions, analyze and cue movement patterns, analyze and modify body mechanics/ergonomics, assess and modify postural abnormalities and instruct in home and community integration to attain remaining goals.    Progress toward goals / Updated goals:    See newly established goals in POC     PLAN  [x]  Upgrade activities as tolerated yes Continue plan of care   []  Discharge due to :    []  Other:      Therapist: Maggie Hogan    Date: 2022 Time: 1:47 PM     Future Appointments   Date Time Provider Lisset Chawla   5/18/2022  2:45 PM Iliana Fore [facility]   5/19/2022 11:45 AM Shanell Meliza Grier [facility]   5/23/2022  9:30 AM [place], [address] [facility]   5/25/2022 12:00 PM Alvia Fore [facility]   5/26/2022 11:00 AM Alvia Fore [facility]   5/31/2022  2:15 PM Iliana Fore [facility]   6/1/2022  2:15 PM Jas Stacye, [facility]   6/3/2022 10:45 AM Jas Stacye, PT [facility]   6/6/2022  2:00 PM Jas Passe, PT [facility]   6/8/2022 12:30 PM Jas Passe, PT [facility]   1/11/2023 10:30 AM [facility]   1/11/2023 11:00 AM David Fofana 27

## 2022-05-12 NOTE — PROGRESS NOTES
[address] [place] [address] PHYSICAL THERAPY  [address] [place] [address] 51, [facility] 201,[facility], [facility] - Phone: [phone]  Fax: [phone] / 4634 [facility]  Patient Name: Belén Manrique : 1945   Medical   Diagnosis: Right knee pain [M25.561] Treatment Diagnosis: Right knee pain [M25.561]   Onset Date: 2022     Referral Source: Dorothy Cortes MD [facility]): 2022   Prior Hospitalization: See medical history Provider #: [id number]   Prior Level of Function: Independent painful ambulation and prolonged standing   Comorbidities: H/O Prostate Cancer (), HTN, L4/5 L/S Surgery, LBP, Thyroid Problems, L TKR (2021), H/O L LE DVT ()   Medications: Verified on Patient Summary List   The Plan of Care and following information is based on the information from the initial evaluation.   ===========================================================================================  Assessment / key information: Patient is a 64 y.o. male who lives with wife in 1 story home with 4 steps to enter presents to [facility] at [facility]. with diagnosis of Right knee pain [M25.561]. Patient is s/p R TKR 2022. Notes participation in [facility] PT services following surgery. Pending doppler of R LE perfomed 2022 to r/o R LE DVT. Pt describes right knee pain as a constant ache and soreness located in the anterior aspect of the knee and posterior right thigh. Patient's pain level is rated as 3/10 at the best, 3-4/10 currently, and 7-8/10 at the worst. Knee pain and difficulty with sleeping, transferring from sit to stand, stairs, prolonged standing, donning shoes and socks, transferring in into the shower, and transferring in and out of car decreases with medication. Gait analysis revealed mod I ambulation with SPC with decreased TKE during initial contact and heel off phases of gait.    Upon objective evaluation patient presents with impaired and painful A/PROM of right knee (R knee A/PROM: -17/-15 to 82/86 deg/ L knee AROM = 0 to 120 deg), grossly impaired R hip and knee strength, edema t/o the left knee (R/L joint line 40/36.5 cm; 38.25/35 cm; suprapatellar 42/37cm), impaired static standing balance, tenderness to palpation to M/L distal hamstring and proximal gastroc, and decreased flexibility of the hamstring (R/L ham 90/90 = 126/130 deg), quadriceps, and gastrocnemius muscles. Observation of right knee revealed post surgical scar with good healing and noted slight redness and warmth. Patient can benefit from skilled PT to increase knee ROM, strength, joint mobility, flexibility, and balance, decrease swelling, tone, TTP, and pain to improve overall function and quality of life.    ===========================================================================================  Eval Complexity: History HIGH Complexity :3+ comorbidities / personal factors will impact the outcome/ POC ;  Examination  HIGH Complexity : 4+ Standardized tests and measures addressing body structure, function, activity limitation and / or participation in recreation ; Presentation MEDIUM Complexity : Evolving with changing characteristics ; Decision Making Other outcome measures Objective ROM Measurements  MEDIUM;  Overall Complexity MEDIUM  Problem List: pain affecting function, decrease ROM, decrease strength, edema affecting function, impaired gait/ balance, decrease ADL/ functional abilitiies, decrease activity tolerance, decrease flexibility/ joint mobility and decrease transfer abilities   Treatment Plan may include any combination of the following: Therapeutic exercise, Therapeutic activities, Neuromuscular re-education, Physical agent/modality, Gait/balance training, Manual therapy, Patient education, Self Care training, Functional mobility training, Home safety training and Stair training  Patient / Family readiness to learn indicated by: asking questions, trying to perform skills and interest  Persons(s) to be included in education: patient (P)  Barriers to Learning/Limitations: None  Measures taken, if barriers to learning:    Patient Goal (s): \"get motion back like the left to 120 deg\"   Patient self reported health status: good  Rehabilitation Potential: good   Short Term Goals: To be accomplished in  3  weeks:  1) Establish home exercise program.  2) Increase left knee AROM to -8 to 98 degrees to facilitate improved gait mechanics with ambulation.  Long Term Goals: To be accomplished in  6  weeks:  1) Patient will report decreased c/o pain to < or = 5/10 at the worst to facilitate improved ability to perform ADLs with manageable sx in the left knee. 2) Increase left knee AROM to -4 to 108 degrees to facilitate improved gait mechanics with ambulation. 3) Patient will increase right knee strength to 4+/5 so patient has improved ability to navigate stairs reciprocally. Frequency / Duration:   Patient to be seen  3  times per week for 3  Weeks  2  times per week for 3  weeks:  Patient / Caregiver education and instruction: self care, activity modification, brace/ splint application and exercises  Therapist Signature: Arsen Burton Date: 7/65/9617   Certification Period: 5/12/2022 to 8/11/2022 Time: 12:03 PM   ===========================================================================================  I certify that the above Physical Therapy Services are being furnished while the patient is under my care. I agree with the treatment plan and certify that this therapy is necessary. Physician Signature:        Date:       Time:                                           Jesus Dye MD  Please sign and return to [facility] at [facility]. or you may fax the signed copy to [phone]. Thank you.

## 2022-05-16 ENCOUNTER — HOSPITAL ENCOUNTER (OUTPATIENT)
Dept: PHYSICAL THERAPY | Age: 77
Discharge: HOME | End: 2022-05-16
Payer: MEDICARE

## 2022-05-16 PROCEDURE — 97140 MANUAL THERAPY 1/> REGIONS: CPT

## 2022-05-16 PROCEDURE — 97110 THERAPEUTIC EXERCISES: CPT

## 2022-05-16 NOTE — PROGRESS NOTES
PHYSICAL THERAPY - DAILY TREATMENT NOTE    Patient Name: Ant Catherine        Date: 2022  : 1945   yes Patient  Verified  Visit #:     Insurance: Payor: Catherine Mercer / Plan: VA MEDICARE PART A & B / Product Type: Medicare /      In time: 5:57 Out time: 6:53   Total Treatment Time: 56     Medicare/BCBS Time Tracking (below)   Total Timed Codes (min):  56 1:1 Treatment Time:  56     TREATMENT AREA =  Pain in right knee [M25.561]    SUBJECTIVE  Pain Level (on 0 to 10 scale):  4  / 10   Medication Changes/New allergies or changes in medical history, any new surgeries or procedures?    no  If yes, update Summary List   Subjective Functional Status/Changes:  []  No changes reported     I turned around and this right leg was underneath the desk. Xin Waller out and got the mail today. 4/10 R knee 3/10 R ankle       OBJECTIVE  Modalities Rationale:     decrease pain to improve patient's ability to tolerate prolonged ambulation   min [] Estim, type/location:                                      []  att     []  unatt     []  w/US     []  w/ice    []  w/heat    min []  Mechanical Traction: type/lbs                   []  pro   []  sup   []  int   []  cont    []  before manual    []  after manual    min []  Ultrasound, settings/location:      min []  Iontophoresis w/ dexamethasone, location:                                               []  take home patch       []  in clinic   TC min []  Ice     []  Heat    location/position:     min []  Vasopneumatic Device, press/temp:     min []  Other:    [x] Skin assessment post-treatment (if applicable):    [x]  intact    []  redness- no adverse reaction     []redness  adverse reaction:        33 min Therapeutic Exercise:  [x]  See flow sheet   Rationale:      increase ROM and increase strength to improve the patients ability to tolerate prolonged standing.       23 min Manual Therapy: R knee PROM, patellar mobs, STM to hamstring and gastroc   Rationale:      decrease pain, increase ROM, increase tissue extensibility, decrease edema  and decrease trigger points to improve patient's ability to sleep at night   The manual therapy interventions were performed at a separate and distinct time from the therapeutic activities interventions. Billed With/As:   [x] TE   [] TA   [] Neuro   [] Self Care Patient Education: [x] Review HEP    [x] Progressed/Changed HEP based on:   [] positioning   [] body mechanics   [] transfers   [] heat/ice application    [] other:      Other Objective/Functional Measures:    Demonstrate tenderness to palpation and muscle hypertonicity to R quad and hamstring musculature   R Knee P/AROM = -12 to 91 deg  Per patient report - negative doppler to r/o R LE DVT (copy of results in chart)     Post Treatment Pain Level (on 0 to 10) scale:   7 / 10     ASSESSMENT  Assessment/Changes in Function:     Initiated R LE AROM and flexibility program with good patient tolerance. VCing for proper form with straightening in order to decrease hip ER compensatory movement for max benefit. TC to ice post treatment - recommended use of ice at home to assist with soreness. Updated and issued HEP. []  See Progress Note/Recertification   Patient will continue to benefit from skilled PT services to modify and progress therapeutic interventions, address functional mobility deficits, address ROM deficits, address strength deficits, analyze and address soft tissue restrictions, analyze and cue movement patterns, analyze and modify body mechanics/ergonomics, assess and modify postural abnormalities and instruct in home and community integration to attain remaining goals. Progress toward goals / Updated goals:    First visit after initial evaluation. Progress tx per POC.         PLAN  [x]  Upgrade activities as tolerated yes Continue plan of care   []  Discharge due to :    []  Other:      Therapist: Marci Evans    Date: 5/16/2022 Time: 1:46 PM     Future Appointments Date Time Provider Lisset Chawla   5/16/2022  6:00 PM [facility]   5/18/2022  2:45 PM [facility]   5/19/2022 11:45 AM [facility]   5/23/2022  9:30 AM [place], [address] [facility]   5/25/2022 12:00 PM [facility]   5/26/2022 11:00 AM [facility]   5/31/2022  2:15 PM [facility]   6/1/2022  2:15 PM Linda Comes, PT [facility]   6/3/2022 10:45 AM Linda Comes, PT [facility]   6/6/2022  2:00 PM Linda Comes, PT [facility]   6/8/2022 12:30 PM Linda Comes, PT [facility]   1/11/2023 10:30 AM [facility] HARESH SCHED   1/11/2023 11:00 AM Loni Spencer, [address]

## 2022-05-18 ENCOUNTER — HOSPITAL ENCOUNTER (OUTPATIENT)
Dept: PHYSICAL THERAPY | Age: 77
Discharge: HOME | End: 2022-05-18
Payer: MEDICARE

## 2022-05-18 PROCEDURE — 97110 THERAPEUTIC EXERCISES: CPT

## 2022-05-18 PROCEDURE — 97140 MANUAL THERAPY 1/> REGIONS: CPT

## 2022-05-18 NOTE — PROGRESS NOTES
PHYSICAL THERAPY - DAILY TREATMENT NOTE    Patient Name: Ivonne Keys        Date: 2022  : 1945   Yes Patient  Verified  Visit #:   3   of   15  Insurance: Payor: Fe Arguelles / Plan: [place] MED[place]ARE PART A & B / Product Type: Medicare /      In time: 248 Out time: 3:28   Total Treatment Time: 40     Medicare/BCBS Time Tracking (below)   Total Timed Codes (min):  40 1:1 Treatment Time: 40     TREATMENT AREA = R knee Pain [M25.561]  SUBJECTIVE  Pain Level (on 0 to 10 scale):  1-2  / 10   Medication Changes/New allergies or changes in medical history, any new surgeries or procedures?    no  If yes, update Summary List   Subjective Functional Status/Changes:  []  No changes reported     Notes increased soreness after last visit          OBJECTIVE  Modalities Rationale:   24 min Therapeutic Exercise:  [x]  See flow sheet   Rationale:      increase ROM and increase strength to improve the patients ability to perform standing ADLs. 16 min Manual Therapy: L knee PROM in extension and flexion (flexion EOB), STM medial hamstring and distal quadriceps, prone knee extension PROM   Rationale:      decrease pain, increase ROM, increase tissue extensibility, and decrease trigger points to improve patient's ability to don and doff shoes and socks   The manual therapy interventions were performed at a separate and distinct time from the therapeutic activities interventions. Billed With/As:   [x] TE   [] TA   [] Neuro   [] Self Care Patient Education: [x] Review HEP    [x] Progressed/Changed HEP based on:   [x] positioning   [x] body mechanics   [] transfers   [] heat/ice application    [] other:      Other Objective/Functional Measures:    R Knee AROM = -9 to 94 deg  Added prone hang     Post Treatment Pain Level (on 0 to 10) scale:   3 / 10     ASSESSMENT  Assessment/Changes in Function:     Seeing improvements in R knee ROM measurements. Progressed AAROM with good tolerance.       []  See Progress Note/Recertification   Patient will continue to benefit from skilled PT services to modify and progress therapeutic interventions, address functional mobility deficits, address ROM deficits, address strength deficits, analyze and address soft tissue restrictions, analyze and cue movement patterns, analyze and modify body mechanics/ergonomics, assess and modify postural abnormalities and instruct in home and community integration to attain remaining goals. Progress toward goals / Updated goals:    1) Establish home exercise program. Goal in progress - I with HEP (5/18/22)  2) Increase left knee AROM to -8 to 98 degrees to facilitate improved gait mechanics with ambulation.  Goal in progress - L knee AROM -9 to 94 deg (5/18/22)       PLAN  [x]  Upgrade activities as tolerated yes Continue plan of care   []  Discharge due to :    []  Other:      Therapist: Jv Bianchi    Date: 5/18/2022 Time: 1:47 PM     Future Appointments   Date Time Provider Lisset Chawla   5/18/2022  2:45 PM Eri Villar [facility]   5/19/2022 11:45 AM [place], [address] [facility]   5/23/2022  9:30 AM [place], [address] [facility]   5/25/2022 12:00 PM Eri ROMEOTC [facility]   5/26/2022 11:00 AM Erich Reece   5/31/2022  2:15 PM Eri Villar [facility]   6/1/2022  2:15 PM Luh Angeles, PT [facility]   6/3/2022 10:45 AM Luh Angeles, PT [facility]   6/6/2022  2:00 PM Luh Angeles, PT [facility]   6/8/2022 12:30 PM [facility]   1/11/2023 10:30 AM [address] [facility]   1/11/2023 11:00 AM David Hollis

## 2022-05-19 ENCOUNTER — HOSPITAL ENCOUNTER (OUTPATIENT)
Dept: PHYSICAL THERAPY | Age: 77
Discharge: HOME | End: 2022-05-19
Payer: MEDICARE

## 2022-05-19 ENCOUNTER — APPOINTMENT (OUTPATIENT)
Dept: PHYSICAL THERAPY | Age: 77
End: 2022-05-19
Payer: MEDICARE

## 2022-05-19 PROCEDURE — 97110 THERAPEUTIC EXERCISES: CPT

## 2022-05-19 PROCEDURE — 97140 MANUAL THERAPY 1/> REGIONS: CPT

## 2022-05-19 NOTE — PROGRESS NOTES
PHYSICAL THERAPY - DAILY TREATMENT NOTE    Patient Name: Terence Draper        Date: 2022  : 1945   Yes Patient  Verified  Visit #:   4   of   15  Insurance: Payor: Anamaria Choi / Plan: [place] MEDICARE PART A & B / Product Type: Medicare /      In time: 11:46 Out time: 12:31   Total Treatment Time: 45     Medicare/BCBS Time Tracking (below)   Total Timed Codes (min):  45 1:1 Treatment Time: 45     TREATMENT AREA =  Right knee pain [M25.561]    SUBJECTIVE  Pain Level (on 0 to 10 scale):  2-3  / 10   Medication Changes/New allergies or changes in medical history, any new surgeries or procedures?    no  If yes, update Summary List   Subjective Functional Status/Changes:  []  No changes reported     [place] stiff last night            OBJECTIVE  Modalities Rationale:   29 min Therapeutic Exercise:  [x]  See flow sheet   Rationale:      increase ROM and increase strength to improve the patients ability to perform standing ADLs. 16 min Manual Therapy: L knee PROM in extension and flexion (flexion EOB), STM medial hamstring and distal quadriceps, prone knee extension PROM   Rationale:      decrease pain, increase ROM, increase tissue extensibility, and decrease trigger points to improve patient's ability to don and doff shoes and socks   The manual therapy interventions were performed at a separate and distinct time from the therapeutic activities interventions.    Billed With/As:   [x] TE   [] TA   [] Neuro   [] Self Care Patient Education: [x] Review HEP    [x] Progressed/Changed HEP based on:   [x] positioning   [x] body mechanics   [] transfers   [] heat/ice application    [] other:      Other Objective/Functional Measures:    R Knee AROM = -10 to 95 deg  Continues to present with mm tone and TTP to hamstring and RF     Post Treatment Pain Level (on 0 to 10) scale:   1-2 / 10     ASSESSMENT  Assessment/Changes in Function:     Patient education on desensitivity techniques - secondary to patient c/o of pain when sheets touch his knee. []  See Progress Note/Recertification   Patient will continue to benefit from skilled PT services to modify and progress therapeutic interventions, address functional mobility deficits, address ROM deficits, address strength deficits, analyze and address soft tissue restrictions, analyze and cue movement patterns, analyze and modify body mechanics/ergonomics, assess and modify postural abnormalities and instruct in home and community integration to attain remaining goals. Progress toward goals / Updated goals:    1) Establish home exercise program. Goal in progress - I with HEP (5/18/22)  2) Increase left knee AROM to -8 to 98 degrees to facilitate improved gait mechanics with ambulation.  Goal in progress - L knee AROM -10 to 95 deg (5/18/22)       PLAN  [x]  Upgrade activities as tolerated yes Continue plan of care   []  Discharge due to :    []  Other:      Therapist: Radha Wakefield    Date: 5/19/2022 Time: 1:47 PM     Future Appointments   Date Time Provider Lisset Chawla   5/19/2022 11:45 AM Juan Dux [facility]   5/23/2022  9:30 AM [place], [address] [facility]   5/25/2022 12:00 PM Juan Dux [facility]   5/26/2022 11:00 AM Juan Dux [facility]   5/31/2022  2:15 PM Juan Dux [address] [facility]   6/1/2022  2:15 PM Jacobo [place],  [address] [facility]   6/3/2022 10:45 AM Jacobo [place],  [address] [facility]   6/6/2022  2:00 PM Jacobo [place],  [address] [facility]   6/8/2022 12:30 PM Jacobo [place], PT [facility]   1/11/2023 10:30 AM [address] [place] [address]   1/11/2023 11:00 AM Waunita Goldberg, [address]

## 2022-05-23 ENCOUNTER — HOSPITAL ENCOUNTER (OUTPATIENT)
Dept: PHYSICAL THERAPY | Age: 77
Discharge: HOME | End: 2022-05-23
Payer: MEDICARE

## 2022-05-23 PROCEDURE — 97110 THERAPEUTIC EXERCISES: CPT

## 2022-05-23 PROCEDURE — 97140 MANUAL THERAPY 1/> REGIONS: CPT

## 2022-05-23 NOTE — PROGRESS NOTES
PHYSICAL THERAPY - DAILY TREATMENT NOTE    Patient Name: Tiffany Mckeon        Date: 2022  : 1945   yes Patient  Verified  Visit #:     Insurance: Payor: Nettie Perez / Plan: [place] MEDICARE PART A & B / Product Type: Medicare /      In time: : Out time: 10:19   Total Treatment Time: 56     Medicare/BCBS Time Tracking (below)   Total Timed Codes (min):  56 1:1 Treatment Time:  56     TREATMENT AREA =  Pain in right knee [M25.561]  SUBJECTIVE  Pain Level (on 0 to 10 scale):  3- 10   Medication Changes/New allergies or changes in medical history, any new surgeries or procedures?    no  If yes, update Summary List   Subjective Functional Status/Changes:  []  No changes reported     Notes R ankle pain after prolonged ambulation - wife reports pt is bow legged and walks on the outside of the foot        OBJECTIVE  Modalities Rationale:     decrease pain to improve patient's ability to tolerate prolonged ambulation   min [] Estim, type/location:                                      []  att     []  unatt     []  w/US     []  w/ice    []  w/heat    min []  Mechanical Traction: type/lbs                   []  pro   []  sup   []  int   []  cont    []  before manual    []  after manual    min []  Ultrasound, settings/location:      min []  Iontophoresis w/ dexamethasone, location:                                               []  take home patch       []  in clinic   TC min []  Ice     [x]  Heat    location/position: With manual    min []  Vasopneumatic Device, press/temp:     min []  Other:    [x] Skin assessment post-treatment (if applicable):    [x]  intact    []  redness- no adverse reaction     []redness  adverse reaction:        33 min Therapeutic Exercise:  [x]  See flow sheet   Rationale:      increase ROM and increase strength to improve the patients ability to tolerate prolonged standing.       23 min Manual Therapy: R patellar mobs, STM to hamstring and gastroc in prone with MHP & 4# prone hang, Sitting and supine R knee flexion PROM, supine R knee extension PROM   Rationale:      decrease pain, increase ROM, increase tissue extensibility, decrease edema  and decrease trigger points to improve patient's ability to sleep at night   The manual therapy interventions were performed at a separate and distinct time from the therapeutic activities interventions. Billed With/As:   [x] TE   [] TA   [] Neuro   [] Self Care Patient Education: [x] Review HEP    [x] Progressed/Changed HEP based on:   [] positioning   [] body mechanics   [] transfers   [] heat/ice application    [] other:      Other Objective/Functional Measures:    Improving muscle hypertonicity to R quad - continues to report TTP  R Knee P/AROM = 8 to 98 deg  Added rocking on bike Seat 10-9       1-2  / 10     ASSESSMENT  Assessment/Changes in Function:     Slow improvements in R knee PROM. Progressed AAROM with increased challenge. []  See Progress Note/Recertification   Patient will continue to benefit from skilled PT services to modify and progress therapeutic interventions, address functional mobility deficits, address ROM deficits, address strength deficits, analyze and address soft tissue restrictions, analyze and cue movement patterns, analyze and modify body mechanics/ergonomics, assess and modify postural abnormalities and instruct in home and community integration to attain remaining goals. Progress toward goals / Updated goals:    2. Increase L knee AROM to -8 to 98 deg to facilitate improved gait mechanics with ambulation.  Goal in progress L knee AROM = -8 to 98 following manual and therex (5/23/22)       PLAN  [x]  Upgrade activities as tolerated yes Continue plan of care   []  Discharge due to :    []  Other:      Therapist: Karna Habermann    Date: 5/23/2022 Time: 1:46 PM     Future Appointments   Date Time Provider Lisset Chawla   5/25/2022 12:00 PM miranda Sers [facility]   5/26/2022 11:00 AM Juan Dux [facility]   5/31/2022  2:15 PM Juan Dux [facility]   6/3/2022 10:45 AM Jacobo Mooney, PT [facility]   6/6/2022  2:00 PM Jacobo Mooney, PT [facility]   6/8/2022 12:30 PM Jacobo Mooney, PT [facility]   6/13/2022 12:00 PM Juan Dux [facility]   1/11/2023 10:30 AM [facility]   1/11/2023 11:00 AM Waunita Goldberg, [address]

## 2022-05-24 NOTE — PROGRESS NOTES
PHYSICAL THERAPY - DAILY TREATMENT NOTE    Patient Name: Belén Manrique        Date: 2022  : 1945   yes Patient  Verified  Visit #:     Insurance: Payor: Galina Beaulieu / Plan: [place] MEDICARE PART A & B / Product Type: Medicare /      In time: 12:00 Out time: 12:40   Total Treatment Time: 40     Medicare/BCBS Time Tracking (below)   Total Timed Codes (min):  40 1:1 Treatment Time: 40     TREATMENT AREA =  Pain in right knee [M25.561]  SUBJECTIVE  Pain Level (on 0 to 10 scale):  1  / 10   Medication Changes/New allergies or changes in medical history, any new surgeries or procedures?    no  If yes, update Summary List   Subjective Functional Status/Changes:  []  No changes reported     R ankle very bothersome wiith walking, continues to report decreased R knee flexion - ache          OBJECTIVE  Modalities Rationale:     decrease pain to improve patient's ability to tolerate prolonged ambulation   min [] Estim, type/location:                                      []  att     []  unatt     []  w/US     []  w/ice    []  w/heat    min []  Mechanical Traction: type/lbs                   []  pro   []  sup   []  int   []  cont    []  before manual    []  after manual    min []  Ultrasound, settings/location:      min []  Iontophoresis w/ dexamethasone, location:                                               []  take home patch       []  in clinic   TC min []  Ice     [x]  Heat    location/position: With manual    min []  Vasopneumatic Device, press/temp:     min []  Other:    [x] Skin assessment post-treatment (if applicable):    [x]  intact    []  redness- no adverse reaction     []redness  adverse reaction:        26 min Therapeutic Exercise:  [x]  See flow sheet   Rationale:      increase ROM and increase strength to improve the patients ability to tolerate prolonged standing.       14 min Manual Therapy: Prone quad stretch, Sitting and supine R knee flexion PROM, Supine STM to R quad with Knee in flexion while foot on elevated HOB for sustained flexion   Rationale:      decrease pain, increase ROM, increase tissue extensibility, decrease edema  and decrease trigger points to improve patient's ability to sleep at night   The manual therapy interventions were performed at a separate and distinct time from the therapeutic activities interventions. Billed With/As:   [x] TE   [] TA   [] Neuro   [] Self Care Patient Education: [x] Review HEP    [x] Progressed/Changed HEP based on:   [] positioning   [] body mechanics   [] transfers   [] heat/ice application    [] other:      Other Objective/Functional Measures:    R Knee AAROM/AROM = 95 to 97 deg      2  / 10     ASSESSMENT  Assessment/Changes in Function:     Focus on R knee flexion AROM today. Very slow improvements in mobility. Good tolerance for session with little increase in AROM. []  See Progress Note/Recertification   Patient will continue to benefit from skilled PT services to modify and progress therapeutic interventions, address functional mobility deficits, address ROM deficits, address strength deficits, analyze and address soft tissue restrictions, analyze and cue movement patterns, analyze and modify body mechanics/ergonomics, assess and modify postural abnormalities and instruct in home and community integration to attain remaining goals. Progress toward goals / Updated goals:    2. Increase L knee AROM to -8 to 98 deg to facilitate improved gait mechanics with ambulation.  Goal in progress L knee AROM = NT to 96 following manual and therex (5/25/22)       PLAN  [x]  Upgrade activities as tolerated yes Continue plan of care   []  Discharge due to :    []  Other:      Therapist: Anamika Cabrera    Date: 5/25/2022 Time: 1:46 PM     Future Appointments   Date Time Provider Lisset Chawla   5/25/2022 12:00 PM Billy Munoz [facility]   5/26/2022 11:00 AM Billy [address]   5/31/2022  2:15 PM Alisa Sierra [facility]   6/3/2022 10:45 AM Sohail Davison, PT [facility]   6/6/2022  2:00 PM Sohail Davison, PT [facility]   6/8/2022 12:30 PM Sohail Davison [facility]   6/13/2022 12:00 PM Marlyn Whitmore [facility]   1/11/2023 10:30 AM [facility] HARESH SCHED   1/11/2023 11:00 AM Bárbara Guillory, [address] [facility]

## 2022-05-25 ENCOUNTER — HOSPITAL ENCOUNTER (OUTPATIENT)
Dept: PHYSICAL THERAPY | Age: 77
Discharge: HOME | End: 2022-05-25
Payer: MEDICARE

## 2022-05-25 PROCEDURE — 97140 MANUAL THERAPY 1/> REGIONS: CPT

## 2022-05-25 PROCEDURE — 97110 THERAPEUTIC EXERCISES: CPT

## 2022-05-26 ENCOUNTER — HOSPITAL ENCOUNTER (OUTPATIENT)
Dept: PHYSICAL THERAPY | Age: 77
Discharge: HOME | End: 2022-05-26
Payer: MEDICARE

## 2022-05-26 PROCEDURE — 97140 MANUAL THERAPY 1/> REGIONS: CPT

## 2022-05-26 PROCEDURE — 97110 THERAPEUTIC EXERCISES: CPT

## 2022-05-26 NOTE — PROGRESS NOTES
PHYSICAL THERAPY - DAILY TREATMENT NOTE    Patient Name: Terence Draper        Date: 2022  : 1945   Yes Patient  Verified  Visit #:      of   15  Insurance: Payor: Anamaria Choi / Plan: VA MEDICARE PART A & B / Product Type: Medicare /      In time: 10:50 Out time: 11:52   Total Treatment Time: 62     Medicare/BCBS Time Tracking (below)   Total Timed Codes (min):  62 1:1 Treatment Time: 62     TREATMENT AREA =    SUBJECTIVE  Pain Level (on 0 to 10 scale):  0-1  / 10   Medication Changes/New allergies or changes in medical history, any new surgeries or procedures?    no  If yes, update Summary List   Subjective Functional Status/Changes:  []  No changes reported     Just ache, went to MD yesterday - he said not to worry but gave me 3 weeks to get up to 100/105 deg. I have an appt on the  if not there with surgically manipulate the joint           OBJECTIVE  Modalities Rationale:   38 min Therapeutic Exercise:  [x]  See flow sheet   Rationale:      increase ROM and increase strength to improve the patients ability to perform standing ADLs. 24 min Manual Therapy: L knee PROM in extension and flexion (flexion EOB and supine with slide board and MHP)(extension in supine), STM medial hamstring in prone (with 4# ankle wt  and distal quadriceps, prone knee extension PROM   Rationale:      decrease pain, increase ROM, increase tissue extensibility, and decrease trigger points to improve patient's ability to don and doff shoes and socks   The manual therapy interventions were performed at a separate and distinct time from the therapeutic activities interventions.    Billed With/As:   [x] TE   [] TA   [] Neuro   [] Self Care Patient Education: [x] Review HEP    [x] Progressed/Changed HEP based on:   [x] positioning   [x] body mechanics   [] transfers   [] heat/ice application    [] other:      Other Objective/Functional Measures:    R Knee P/AROM = -4/-7 to 102/99 deg  Recommended supine wall heel slides if able at home in order to use gravity to assisst with ROM     Post Treatment Pain Level (on 0 to 10) scale:   0/ 10     ASSESSMENT  Assessment/Changes in Function:     Discussed continuation of 3x per week for 3 weeks in order to address continued ROM limitations and reach MD flexion goal of 100/105 by 6/14/22. Good tolerance for session denies pain post session - only reporting ache.      []  See Progress Note/Recertification   Patient will continue to benefit from skilled PT services to modify and progress therapeutic interventions, address functional mobility deficits, address ROM deficits, address strength deficits, analyze and address soft tissue restrictions, analyze and cue movement patterns, analyze and modify body mechanics/ergonomics, assess and modify postural abnormalities and instruct in home and community integration to attain remaining goals. Progress toward goals / Updated goals:    1) Establish home exercise program. Goal in progress - I with HEP (5/18/22)  2) Increase left knee AROM to -8 to 98 degrees to facilitate improved gait mechanics with ambulation.  Goal in progress - L knee AROM -7 to 99 deg (526/22)       PLAN  [x]  Upgrade activities as tolerated yes Continue plan of care   []  Discharge due to :    []  Other:      Therapist: Manuel Adams    Date: 5/26/2022 Time: 1:47 PM     Future Appointments   Date Time Provider Lisset Chawla   5/26/2022 11:00 AM Iliana Huertas [facility]   5/31/2022  2:15 PM Iliana Huertas [facility]   6/3/2022 10:45 AM Jas Robert PT [facility]   6/6/2022  2:00 PM Jas Robert PT [facility]   6/8/2022 12:30 PM Jas Robert PT [facility]   6/13/2022 12:00 PM Iliana Huertas [facility]   1/11/2023 10:30 AM [facility] [address]   1/11/2023 11:00 AM David Fofana

## 2022-05-31 ENCOUNTER — HOSPITAL ENCOUNTER (OUTPATIENT)
Dept: PHYSICAL THERAPY | Age: 77
Discharge: HOME | End: 2022-05-31
Payer: MEDICARE

## 2022-05-31 PROCEDURE — 97140 MANUAL THERAPY 1/> REGIONS: CPT

## 2022-05-31 PROCEDURE — 97110 THERAPEUTIC EXERCISES: CPT

## 2022-05-31 NOTE — PROGRESS NOTES
PHYSICAL THERAPY - DAILY TREATMENT NOTE    Patient Name: Aaron Calderon        Date: 2022  : 1945   Yes Patient  Verified  Visit #:   8   of   15  Insurance: Payor: Krishna Mathew / Plan: [place] MEDICARE PART A & B / Product Type: Medicare /      In time: 2:14 Out time: 3:07   Total Treatment Time: 53     Medicare/BCBS Time Tracking (below)   Total Timed Codes (min):  53 1:1 Treatment Time: 53     TREATMENT AREA =  Right knee pain [M25.561]    SUBJECTIVE  Pain Level (on 0 to 10 scale):  2  / 10   Medication Changes/New allergies or changes in medical history, any new surgeries or procedures?    no  If yes, update Summary List   Subjective Functional Status/Changes:  []  No changes reported     Soaked in the tub woke up and it was stiff as a board. cissie pushed it to 145          OBJECTIVE    Modalities Rationale:   30 min Therapeutic Exercise:  [x]  See flow sheet   Rationale:      increase ROM and increase strength to improve the patients ability to perform standing ADLs. 23 min Manual Therapy: L knee PROM in extension and flexion (flexion EOB and supine with slide board and MHP)(extension in supine), STM medial hamstring in prone (with 4# ankle wt for 5 min) and distal quadriceps, prone knee extension PROM   Rationale:      decrease pain, increase ROM, increase tissue extensibility, and decrease trigger points to improve patient's ability to don and doff shoes and socks   The manual therapy interventions were performed at a separate and distinct time from the therapeutic activities interventions.    Billed With/As:   [x] TE   [] TA   [] Neuro   [] Self Care Patient Education: [x] Review HEP    [x] Progressed/Changed HEP based on:   [x] positioning   [x] body mechanics   [] transfers   [] heat/ice application    [] other:      Other Objective/Functional Measures:    R Knee P/AROM = -8/-11 to 103/98 deg  Demonstrates improving gait mechanics without use of AD      Post Treatment Pain Level (on 0 to 10) scale:   1/ 10     ASSESSMENT  Assessment/Changes in Function:     Increased R knee stiffness upon treatment session. Continues to present with R LE swelling (patient reports some swelling may be due to past spider bite). Recommended when sleeping in supine to place pillow or blanket under knees to decrease morning stiffness. []  See Progress Note/Recertification   Patient will continue to benefit from skilled PT services to modify and progress therapeutic interventions, address functional mobility deficits, address ROM deficits, address strength deficits, analyze and address soft tissue restrictions, analyze and cue movement patterns, analyze and modify body mechanics/ergonomics, assess and modify postural abnormalities and instruct in home and community integration to attain remaining goals. Progress toward goals / Updated goals:    1) Establish home exercise program. Goal in progress - I with HEP (5/18/22)  2) Increase left knee AROM to -8 to 98 degrees to facilitate improved gait mechanics with ambulation.  Goal in progress - L knee AROM -8 to 98 deg (5/31/22)       PLAN  [x]  Upgrade activities as tolerated yes Continue plan of care   []  Discharge due to :    []  Other:      Therapist: Anamika Cabrera    Date: 5/31/2022 Time: 1:47 PM     Future Appointments   Date Time Provider Lisset Chawla   5/31/2022  2:15 PM Billy Munoz [facility]   6/3/2022 10:45 AM Melanee Habermann, PT [address]   6/6/2022  2:00 PM Melanee Habermann, PT [facility]   6/8/2022 12:30 PM Melanee Habermann, PT [facility]   6/13/2022 12:00 PM Billy HENSLEY [facility]   1/11/2023 10:30 AM [facility] [address]   1/11/2023 11:00 AM David Foley 27

## 2022-06-01 ENCOUNTER — APPOINTMENT (OUTPATIENT)
Dept: PHYSICAL THERAPY | Age: 77
End: 2022-06-01
Payer: MEDICARE

## 2022-06-03 ENCOUNTER — HOSPITAL ENCOUNTER (OUTPATIENT)
Dept: PHYSICAL THERAPY | Age: 77
Discharge: HOME | End: 2022-06-03
Payer: MEDICARE

## 2022-06-03 PROCEDURE — 97535 SELF CARE MNGMENT TRAINING: CPT | Performed by: PHYSICAL THERAPIST

## 2022-06-03 PROCEDURE — 97140 MANUAL THERAPY 1/> REGIONS: CPT | Performed by: PHYSICAL THERAPIST

## 2022-06-03 NOTE — PROGRESS NOTES
Bernard Ballesteros PHYSICAL THERAPY - DAILY TREATMENT NOTE    Patient Name: Marianela Apodaca        Date: 6/3/2022  : 1945   yes Patient  Verified  Visit #:   8      15  Insurance: Payor: VA MEDICARE / Plan: VA MEDICARE PART A & B / Product Type: Medicare /      In time:  Out time: 1140   Total Treatment Time: 55     Medicare/BCBS Time Tracking (below)   Total Timed Codes (min):  55 1:1 Treatment Time:  55     TREATMENT AREA =  R knee   SUBJECTIVE  Pain Level (on 0 to 10 scale):    / 10   Medication Changes/New allergies or changes in medical history, any new surgeries or procedures?    no  If yes, update Summary List   Subjective Functional Status/Changes:  []  No changes reported     I think that I pushed my knee too hard cause it is a little bit swollen and hot. OBJECTIVE      30 min Manual Therapy: Retrograde massage, knee extension prom, hs/gatroc stretch   Rationale:      decrease pain, increase ROM and increase tissue extensibility to improve patient's ability to complete adls  The manual therapy interventions were performed at a separate and distinct time from the therapeutic activities interventions.     25 min Self Care: See below   Rationale:    increase ROM and decrase swelling to improve the patients ability to complete adls    Billed With/As:   [] TE   [] TA   [] Neuro   [] Self Care Patient Education: [x] Review HEP    [] Progressed/Changed HEP based on:   [] positioning   [] body mechanics   [] transfers   [] heat/ice application    [] other:      Other Objective/Functional Measures:    Reviewed with both pt and wife appropriate techniques for prom as wife applying direct pressure to patella, pt wife able to demo appropriate form following this   General effusion but no red flags  Emphasized extension prom with pt feet elevated head of bed     Post Treatment Pain Level (on 0 to 10) scale:   1  / 10     ASSESSMENT  Assessment/Changes in Function:     No increase in pain following session []  See Progress Note/Recertification   Patient will continue to benefit from skilled PT services to modify and progress therapeutic interventions, address functional mobility deficits, address ROM deficits, address strength deficits, analyze and address soft tissue restrictions, analyze and cue movement patterns, analyze and modify body mechanics/ergonomics, assess and modify postural abnormalities and instruct in home and community integration to attain remaining goals.    Progress toward goals / Updated goals:    Pt and wife agreed to continue with therapy 5x/week to improve prom with therapist     PLAN  []  Upgrade activities as tolerated yes Continue plan of care   []  Discharge due to :    []  Other:      Therapist: Wing Hancock, PT    Date: 6/3/2022 Time: 11:42 AM     Future Appointments   Date Time Provider Lisset Chawla   6/6/2022  2:00 PM Wendy Leon, [facility] [address]   6/7/2022  1:45 PM Wendy Leon PT [facility]   6/8/2022 12:30 PM Wendy Leon PT [facility]   6/9/2022 11:00 AM Wendy Leon PT [facility]   6/10/2022  9:15 AM Wendy Leon PT [facility]   6/13/2022 12:00 PM Zee Delaney [facility]   6/14/2022  3:30 PM Wendy Leon PT [facility]   6/21/2022  2:30 PM Wendy Leon PT [facility]   6/23/2022 11:00 AM Wendy Leon PT [facility]   6/28/2022  2:00 PM Wendy Leon PT [facility]   6/30/2022 11:00 AM Wendy Leon PT [facility]   7/5/2022  1:45 PM Wendy Leon, PT [facility]   7/7/2022 11:00 AM Wendy Leon, PT [facility]   7/12/2022  2:00 PM Wendy Leon, PT [facility]   7/14/2022 11:00 AM Wendy Leon, PT [facility]   7/19/2022  2:30 PM Wendy Leon, PT [facility]   7/21/2022 11:00 AM Wendy Leon, PT [facility]   7/26/2022  2:00 PM Wendy Leon, PT [facility]   7/28/2022 11:00 AM Wendy Leon, PT [facility]   1/11/2023 10:30 AM [facility] [address] [facility]   1/11/2023 11:00 AM Yasmin Ojeda, [address] [place] [address] [facility]

## 2022-06-06 ENCOUNTER — HOSPITAL ENCOUNTER (OUTPATIENT)
Dept: PHYSICAL THERAPY | Age: 77
Discharge: HOME | End: 2022-06-06
Payer: MEDICARE

## 2022-06-06 PROCEDURE — 97110 THERAPEUTIC EXERCISES: CPT | Performed by: PHYSICAL THERAPIST

## 2022-06-06 PROCEDURE — 97140 MANUAL THERAPY 1/> REGIONS: CPT | Performed by: PHYSICAL THERAPIST

## 2022-06-06 NOTE — PROGRESS NOTES
Kerwin Palumbo PHYSICAL THERAPY - DAILY TREATMENT NOTE    Patient Name: Jona Zaman        Date: 2022  : 1945   yes Patient  Verified  Visit #:      15  Insurance: Payor: [employer]  / Plan: [place] MEDICARE PART A & B / Product Type: Medicare /      In time: 200 Out time: 245   Total Treatment Time: 45     Medicare/BCBS Time Tracking (below)   Total Timed Codes (min):  45 1:1 Treatment Time:  45     TREATMENT AREA =  Right knee pain [M25.561]    SUBJECTIVE  Pain Level (on 0 to 10 scale):  3   10   Medication Changes/New allergies or changes in medical history, any new surgeries or procedures?    no  If yes, update Summary List   Subjective Functional Status/Changes:  []  No changes reported     I really have been trying to keep my knee moving and bend it as much as possible. I cannot find a spot on the wall to do the stretch          OBJECTIVE    25 min Therapeutic Exercise:  [x]  See flow sheet   Rationale:      increase ROM and increase strength to improve the patients ability to complete adls     20 min Manual Therapy: Knee prom, retrograde massage, hs/gastroc stretch   Rationale:      decrease pain, increase ROM, increase tissue extensibility and decrease edema  to improve patient's ability to complete adls  The manual therapy interventions were performed at a separate and distinct time from the therapeutic activities interventions.       Billed With/As:   [] TE   [] TA   [] Neuro   [] Self Care Patient Education: [x] Review HEP    [] Progressed/Changed HEP based on:   [] positioning   [] body mechanics   [] transfers   [] heat/ice application    [] other:      Other Objective/Functional Measures:    te as per flow sheet with emphasis on posterior chain stretching, able to obtain 0 degrees off edge of step at end of session     Post Treatment Pain Level (on 0 to 10) scale:   4  / 10     ASSESSMENT  Assessment/Changes in Function:     Increased soreness following session     []  See Progress Note/Recertification   Patient will continue to benefit from skilled PT services to modify and progress therapeutic interventions, address functional mobility deficits, address ROM deficits, address strength deficits, analyze and address soft tissue restrictions, analyze and cue movement patterns, analyze and modify body mechanics/ergonomics, assess and modify postural abnormalities, address imbalance/dizziness and instruct in home and community integration to attain remaining goals.    Progress toward goals / Updated goals:    Progress with rom      PLAN  []  Upgrade activities as tolerated yes Continue plan of care   []  Discharge due to :    []  Other:      Therapist: Haven Kirkpatrick PT    Date: 6/6/2022 Time: 10:25 AM     Future Appointments   Date Time Provider Lisset Chawla   6/6/2022  2:00 PM Day Santos, PT [facility]   6/7/2022  1:45 PM Day Santos PT [facility]   6/8/2022 12:30 PM Day Santos [facility]   6/9/2022 11:00 AM Day Santos PT [facility]   6/10/2022  9:15 AM Day Santos PT [facility]   6/13/2022 12:00 PM Corrinne Galt [facility]   6/14/2022  3:30 PM Day Santos PT [facility]   6/21/2022  2:30 PM Day Santos PT [facility]   6/23/2022 11:00 AM Day Santos PT [facility]   6/28/2022  2:00 PM Day Santos PT [facility]   6/30/2022 11:00 AM Day Santos PT [facility]   7/5/2022  1:45 PM Day Santos, PT [facility]   7/7/2022 11:00 AM Day Santos, PT [facility]   7/12/2022  2:00 PM Day Santos, PT [facility]   7/14/2022 11:00 AM Day Santos, PT [facility]   7/19/2022  2:30 PM Day Santos, PT [facility]   7/21/2022 11:00 AM Day Santos, PT [facility]   7/26/2022  2:00 PM Day Santos, PT [facility]   7/28/2022 11:00 AM Day Santos, PT [facility]   1/11/2023 10:30 AM [address] [place] [address]   1/11/2023 11:00 AM Fran Costa, [address]

## 2022-06-07 ENCOUNTER — HOSPITAL ENCOUNTER (OUTPATIENT)
Dept: PHYSICAL THERAPY | Age: 77
Discharge: HOME | End: 2022-06-07
Payer: MEDICARE

## 2022-06-07 PROCEDURE — 97110 THERAPEUTIC EXERCISES: CPT | Performed by: PHYSICAL THERAPIST

## 2022-06-07 PROCEDURE — 97140 MANUAL THERAPY 1/> REGIONS: CPT | Performed by: PHYSICAL THERAPIST

## 2022-06-08 ENCOUNTER — HOSPITAL ENCOUNTER (OUTPATIENT)
Dept: PHYSICAL THERAPY | Age: 77
Discharge: HOME | End: 2022-06-08
Payer: MEDICARE

## 2022-06-08 PROCEDURE — 97140 MANUAL THERAPY 1/> REGIONS: CPT | Performed by: PHYSICAL THERAPIST

## 2022-06-08 PROCEDURE — 97110 THERAPEUTIC EXERCISES: CPT | Performed by: PHYSICAL THERAPIST

## 2022-06-08 NOTE — PROGRESS NOTES
Carlin Bryson PHYSICAL THERAPY - DAILY TREATMENT NOTE    Patient Name: Ivonne Keys        Date: 2022  : 1945   yes Patient  Verified  Visit #:   11  of   15  Insurance: Payor: Fe Arguelles / Plan: VA MEDICARE PART A & B / Product Type: Medicare /      In time: 3015 Out time: 120   Total Treatment Time: 50     Medicare/BCBS Time Tracking (below)   Total Timed Codes (min):  50 1:1 Treatment Time:  50     TREATMENT AREA =  Right knee pain [M25.561]    SUBJECTIVE  Pain Level (on 0 to 10 scale):  4  / 10   Medication Changes/New allergies or changes in medical history, any new surgeries or procedures?    no  If yes, update Summary List   Subjective Functional Status/Changes:  []  No changes reported     Wife got me to 105 with bending        OBJECTIVE    25 min Therapeutic Exercise:  [x]  See flow sheet   Rationale:      increase ROM and increase strength to improve the patients ability to complete adls     25 min Manual Therapy: Knee prom, retrograde massage, quad stretch   Rationale:      decrease pain, increase ROM, increase tissue extensibility and decrease edema  to improve patient's ability to complete adls  The manual therapy interventions were performed at a separate and distinct time from the therapeutic activities interventions.       Billed With/As:   [] TE   [] TA   [] Neuro   [] Self Care Patient Education: [x] Review HEP    [] Progressed/Changed HEP based on:   [] positioning   [] body mechanics   [] transfers   [] heat/ice application    [] other:      Other Objective/Functional Measures:    emphasized kneeextension this session - prom prone ext 0 degrees  Circumferential measurement at mid patella still 43 cm   Still ambulates with flexed gait pattern     Post Treatment Pain Level (on 0 to 10) scale:   4/ 10     ASSESSMENT  Assessment/Changes in Function:     No increase in pain following session    []  See Progress Note/Recertification   Patient will continue to benefit from skilled PT services to modify and progress therapeutic interventions, address functional mobility deficits, address ROM deficits, address strength deficits, analyze and address soft tissue restrictions, analyze and cue movement patterns, analyze and modify body mechanics/ergonomics, assess and modify postural abnormalities, address imbalance/dizziness and instruct in home and community integration to attain remaining goals.    Progress toward goals / Updated goals:    Progression with rom but not with edema control      PLAN  []  Upgrade activities as tolerated yes Continue plan of care   []  Discharge due to :    []  Other:      Therapist: Diamante Pinon PT    Date: 6/8/2022 Time: 10:25 AM     Future Appointments   Date Time Provider Lisset Chawla   6/9/2022 11:00 AM JAKE [address]   6/10/2022  9:15 AM Ileana Foster PT [facility]   6/14/2022  3:30 PM JAKE Warren [facility]   6/21/2022  2:30 PM Ileana Foster PT [facility]   6/23/2022 11:00 AM Ileana Foster PT [facility]   6/28/2022  2:00 PM Ileana Foster PT [facility]   6/30/2022 11:00 AM Ileana Foster PT [facility]   7/5/2022  1:45 PM Ileana Foster PT [facility]   7/7/2022 11:00 AM Ileana Foster PT [facility]   7/12/2022  2:00 PM Ileana Foster PT [facility]   7/14/2022 11:00 AM Ileana Foster PT [facility]   7/19/2022  2:30 PM Ileana Foster PT [facility]   7/21/2022 11:00 AM Ileana Foster PT [facility]   7/26/2022  2:00 PM Ileana Foster PT [facility]   7/28/2022 11:00 AM Ileana Foster PT [facility]   1/11/2023 10:30 AM [address]   1/11/2023 11:00 AM David Reyes

## 2022-06-09 ENCOUNTER — HOSPITAL ENCOUNTER (OUTPATIENT)
Dept: PHYSICAL THERAPY | Age: 77
Discharge: HOME | End: 2022-06-09
Payer: MEDICARE

## 2022-06-09 PROCEDURE — 97140 MANUAL THERAPY 1/> REGIONS: CPT | Performed by: PHYSICAL THERAPIST

## 2022-06-09 PROCEDURE — 97110 THERAPEUTIC EXERCISES: CPT | Performed by: PHYSICAL THERAPIST

## 2022-06-09 NOTE — PROGRESS NOTES
Inda Brittle PHYSICAL THERAPY - DAILY TREATMENT NOTE    Patient Name: Belén Manrique        Date: 2022  : 1945   yes Patient  Verified  Visit #:   11  of   15  Insurance: Payor: Galina Beaulieu / Plan: VA MEDICARE PART A & B / Product Type: Medicare /      In time: 485 Out time: 7757   Total Treatment Time: 40     Medicare/BCBS Time Tracking (below)   Total Timed Codes (min):  40 1:1 Treatment Time:  40     TREATMENT AREA =  Left shoulder pain [M25.512]    SUBJECTIVE  Pain Level (on 0 to 10 scale):   10   Medication Changes/New allergies or changes in medical history, any new surgeries or procedures?    no  If yes, update Summary List   Subjective Functional Status/Changes:  []  No changes reported   I was able to walk 3 blocks and back without any issues            OBJECTIVE    25 min Therapeutic Exercise:  [x]  See flow sheet   Rationale:      increase ROM and increase strength to improve the patients ability to complete adls     15 min Manual Therapy: Knee prom, retrograde massage, quad stretch   Rationale:      decrease pain, increase ROM, increase tissue extensibility and decrease edema  to improve patient's ability to complete adls  The manual therapy interventions were performed at a separate and distinct time from the therapeutic activities interventions.       Billed With/As:   [] TE   [] TA   [] Neuro   [] Self Care Patient Education: [x] Review HEP    [] Progressed/Changed HEP based on:   [] positioning   [] body mechanics   [] transfers   [] heat/ice application    [] other:      Other Objective/Functional Measures:  104 flexion at stairs  te as per flow sheet without c/o        Post Treatment Pain Level (on 0 to 10) scale:   3/ 10     ASSESSMENT  Assessment/Changes in Function:   No adverse reactions following session      []  See Progress Note/Recertification   Patient will continue to benefit from skilled PT services to modify and progress therapeutic interventions, address functional mobility deficits, address ROM deficits, address strength deficits, analyze and address soft tissue restrictions, analyze and cue movement patterns, analyze and modify body mechanics/ergonomics, assess and modify postural abnormalities, address imbalance/dizziness and instruct in home and community integration to attain remaining goals.    Progress toward goals / Updated goals:    Progress with rom     PLAN  []  Upgrade activities as tolerated yes Continue plan of care   []  Discharge due to :    []  Other:      Therapist: Roseanne Piña PT    Date: 6/9/2022 Time: 10:25 AM     Future Appointments   Date Time Provider Lisset Chawla   6/9/2022 11:00 AM JAKE [address]   6/10/2022  9:15 AM Roseannkatlyn Mtz PT [facility]   6/14/2022  3:30 PM Roseann Mtz PT [facility]   6/21/2022  2:30 PM Roseann Mtz PT [facility]   6/23/2022 11:00 AM Roseann Mtz PT [facility]   6/28/2022  2:00 PM Roseann Mtz PT [facility]   6/30/2022 11:00 AM Roseann Mtz PT [facility]   7/5/2022  1:45 PM Roseann Mtz PT [facility]   7/7/2022 11:00 AM Roseann Mtz PT [facility]   7/12/2022  2:00 PM Roseann Mtz PT [facility]   7/14/2022 11:00 AM Roseann Mtz PT [facility]   7/19/2022  2:30 PM Roseann Mtz PT [facility]   7/21/2022 11:00 AM Roseann Mtz PT [facility]   7/26/2022  2:00 PM Roseann Mtz PT [facility]   7/28/2022 11:00 AM Roseann Mtz, PT [facility]   1/11/2023 10:30 AM [address] [place] [address]   1/11/2023 11:00 AM Lennox Kil, [address] [place] [address]

## 2022-06-10 ENCOUNTER — HOSPITAL ENCOUNTER (OUTPATIENT)
Dept: PHYSICAL THERAPY | Age: 77
Discharge: HOME | End: 2022-06-10
Payer: MEDICARE

## 2022-06-10 PROCEDURE — 97140 MANUAL THERAPY 1/> REGIONS: CPT | Performed by: PHYSICAL THERAPIST

## 2022-06-10 PROCEDURE — 97110 THERAPEUTIC EXERCISES: CPT | Performed by: PHYSICAL THERAPIST

## 2022-06-10 NOTE — PROGRESS NOTES
.[facility]   [facility] [address], [address],[facility], 70 [facility] - Phone: [phone]  Fax: [phone] OF CARE/RECERTIFICATION FOR PHYSICAL THERAPY          Patient Name: Alcon Pinedo : 1945   Treatment/Medical Diagnosis: R TKA   Onset Date: 22    Referral Source: Nica Escamilla MD Start of Care [facility]): 22   Prior Hospitalization: See Medical History Provider #: [id number]   Prior Level of Function: I with painful ambulation and prolonged standing   Comorbidities: H/p prostate cancer (), HTN, l4/5 surgery, LBP, thyroid problems, 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 1. Note daily mac pain </=5/10 in order to increase participation in adls   Status at last Eval: 10/10 Current Status: 4/10 yes   2.  arom -4 - 108 to facilitate improved gait mechanics with ambulation    Status at last Eval: -17- Current Status: -3-98 progressing   3. Increase R knee strength 4+/5 to navigate stairs appropriately    Status at last Eval: 2/5 Current Status: 4-/5 progressing     Key Functional Changes/Progress: PROM ON 22 (-17 - 82) INITIAL EVALUATION 22 ( -8 - 97) YOUR LAST OFFICE VISIT, CURRENT PROM/AROM 0/-3 - 98/104. BOTH EXTENSION AND FLEXION ROM ARE IMPROVING.  THERE IS NO CHANGE IN EDEMA AND CONTINUES WITH CAPSULAR END FEEL  Problem List: pain affecting function, decrease ROM, decrease strength, edema affecting function, impaired gait/ balance, decrease ADL/ functional abilitiies, decrease activity tolerance, decrease flexibility/ joint mobility and decrease transfer abilities   Treatment Plan may include any combination of the following: Therapeutic exercise, Therapeutic activities, Neuromuscular re-education, Physical agent/modality, Gait/balance training, Manual therapy, Patient education, Self Care training, Functional mobility training and Home safety training  Patient Goal(s) has been updated and includes:      Goals for this certification period include and are to be achieved in   4  weeks:  1. Achieve goal #2  2. Achieve goal#3  3. Negotiate stairs with reciprocal gait pattern  Frequency / Duration:   Patient to be seen   2-5   times per week for   4    weeks:    Assessments/Recommendations: await your consult to determine if LATA is indicated. Will continue therapy frequency based on this visit   If you have any questions/comments please contact us directly at [phone]. Thank you for allowing us to assist in the care of your patient. Therapist Signature: Simona Riley, JAKE Date: 9/30/6520   Certification Period:  Reporting Period: 5/12/22-6/10/22  5/12/2208/11/2 Time: 6:52 AM   NOTE TO PHYSICIAN:  PLEASE COMPLETE THE ORDERS BELOW AND FAX TO   [facility] Physical Therapy: [phone]  If you are unable to process this request in 24 hours please contact our office: [phone]    ___ I have read the above report and request that my patient continue as recommended.   ___ I have read the above report and request that my patient continue therapy with the following changes/special instructions: ________________________________________________   ___ I have read the above report and request that my patient be discharged from therapy.      Physician Signature:        Date:       Time:    Cris Matute MD

## 2022-06-10 NOTE — PROGRESS NOTES
[facility] PHYSICAL THERAPY - DAILY TREATMENT NOTE    Patient Name: Ant Catherine        Date: 6/10/2022  : 1945   yes Patient  Verified  Visit #:   15  of   26 Insurance: Payor: Catherine Mercer / Plan: [place] MED[place]ARE PART A & B / Product Type: Medicare /      In time: 1130 Out time: 1210   Total Treatment Time: 40     Medicare/BCBS Time Tracking (below)   Total Timed Codes (min):  40 1:1 Treatment Time:  40     TREATMENT AREA =  Left shoulder pain [M25.512]    SUBJECTIVE  Pain Level (on 0 to 10 scale):  2   10   Medication Changes/New allergies or changes in medical history, any new surgeries or procedures?    no  If yes, update Summary List   Subjective Functional Status/Changes:  []  No changes reported     See pn     OBJECTIVE    10 min Therapeutic Exercise:  [x]  See flow sheet   Rationale:      increase ROM and increase strength to improve the patients ability to complete adls     30 min Manual Therapy: Knee prom, retrograde massage, quad stretch   Rationale:      decrease pain, increase ROM, increase tissue extensibility and decrease edema  to improve patient's ability to complete adls  The manual therapy interventions were performed at a separate and distinct time from the therapeutic activities interventions.       Billed With/As:   [] TE   [] TA   [] Neuro   [] Self Care Patient Education: [x] Review HEP    [] Progressed/Changed HEP based on:   [] positioning   [] body mechanics   [] transfers   [] heat/ice application    [] other:      Other Objective/Functional Measures:  See pn     Post Treatment Pain Level (on 0 to 10) scale:   2/ 10     ASSESSMENT  Assessment/Changes in Function:   See pn   []  See Progress Note/Recertification   Patient will continue to benefit from skilled PT services to modify and progress therapeutic interventions, address functional mobility deficits, address ROM deficits, address strength deficits, analyze and address soft tissue restrictions, analyze and cue movement patterns, analyze and modify body mechanics/ergonomics, assess and modify postural abnormalities, address imbalance/dizziness and instruct in home and community integration to attain remaining goals.    Progress toward goals / Updated goals:  See pn       PLAN  []  Upgrade activities as tolerated yes Continue plan of care   []  Discharge due to :    []  Other:      Therapist: Laure Gibson PT    Date: 6/10/2022 Time: 10:25 AM     Future Appointments   Date Time Provider Lisset Chawla   6/10/2022 11:45 AM Crystal Leila, PT [facility]   6/14/2022  3:30 PM Crystal Leila, PT [facility]   6/21/2022  2:30 PM Crystal Leila, PT [facility]   6/23/2022 11:00 AM Crystal Leila, PT [facility]   6/28/2022  2:00 PM Crystal Kos, PT [facility]   6/30/2022 11:00 AM Crystal Leila, PT [facility]   7/5/2022  1:45 PM Crystal Kos, PT [facility]   7/7/2022 11:00 AM Crystal Leila, PT [facility]   7/12/2022  2:00 PM Crystal Leila, PT [facility]   7/14/2022 11:00 AM Crystal Leila, PT [facility]   7/19/2022  2:30 PM Crystal Leila, PT [facility]   7/21/2022 11:00 AM Crystal Leila, PT [facility]   7/26/2022  2:00 PM Crystal Leila, PT [facility]   7/28/2022 11:00 AM Crystal Dee, PT [facility]   1/11/2023 10:30 AM [address]   1/11/2023 11:00 AM Esteban Burrows, [address] [facility]

## 2022-06-13 ENCOUNTER — APPOINTMENT (OUTPATIENT)
Dept: PHYSICAL THERAPY | Age: 77
End: 2022-06-13
Payer: MEDICARE

## 2022-06-14 ENCOUNTER — HOSPITAL ENCOUNTER (OUTPATIENT)
Dept: PHYSICAL THERAPY | Age: 77
Discharge: HOME | End: 2022-06-14
Payer: MEDICARE

## 2022-06-14 PROCEDURE — 97110 THERAPEUTIC EXERCISES: CPT | Performed by: PHYSICAL THERAPIST

## 2022-06-14 PROCEDURE — 97140 MANUAL THERAPY 1/> REGIONS: CPT | Performed by: PHYSICAL THERAPIST

## 2022-06-14 NOTE — PROGRESS NOTES
[facility] PHYSICAL THERAPY - DAILY TREATMENT NOTE    Patient Name: Gee Alves        Date: 2022  : 1945   yes Patient  Verified  Visit #:   15   of   26  Insurance: Payor: Whitney Yadav / Plan: [place] MEDICARE PART A & B / Product Type: Medicare /      In time: 330 Out time: 411   Total Treatment Time: 40     Medicare/BCBS Time Tracking (below)   Total Timed Codes (min):  40 1:1 Treatment Time:  40     TREATMENT AREA =  Right knee pain [M25.561]    SUBJECTIVE  Pain Level (on 0 to 10 scale):  4  / 10   Medication Changes/New allergies or changes in medical history, any new surgeries or procedures?    no  If yes, update Summary List   Subjective Functional Status/Changes:  []  No changes reported     I met with the pa yesterday and we decided to go ahead with the LATA next Tuesday. They are going to call and schedule it on Monday. OBJECTIVE    25 min Therapeutic Exercise:  [x]  See flow sheet   Rationale:      increase ROM and increase strength to improve the patients ability to complete adls     15 min Manual Therapy: Dmt/stretch quad, knee flexion prom   Rationale:      decrease pain, increase ROM, increase tissue extensibility and decrease trigger points to improve patient's ability to squat  The manual therapy interventions were performed at a separate and distinct time from the therapeutic activities interventions.       Billed With/As:   [] TE   [] TA   [] Neuro   [] Self Care Patient Education: [x] Review HEP    [] Progressed/Changed HEP based on:   [] positioning   [] body mechanics   [] transfers   [] heat/ice application    [] other:      Other Objective/Functional Measures:    aarom flex 103/prom 108/arom 99 - all with end range pain  te as per flow sheet     Post Treatment Pain Level (on 0 to 10) scale:   1  / 10     ASSESSMENT  Assessment/Changes in Function:     No adverse reactions following session      []  See Progress Note/Recertification   Patient will continue to benefit from skilled PT services to modify and progress therapeutic interventions, address functional mobility deficits, address ROM deficits, address strength deficits, analyze and address soft tissue restrictions, analyze and cue movement patterns, analyze and modify body mechanics/ergonomics, assess and modify postural abnormalities, address imbalance/dizziness and instruct in home and community integration to attain remaining goals. Progress toward goals / Updated goals:    Despite gains with rom pt/pa opted to have wood next tues.  Will continue up until that point with current protocol and then increase frequency based on wood protocol      PLAN  []  Upgrade activities as tolerated yes Continue plan of care   []  Discharge due to :    []  Other:      Therapist: Ashok King PT    Date: 6/14/2022 Time: 2:50 PM     Future Appointments   Date Time Provider Lisset Chawla   6/14/2022  3:30 PM Merril [place], PT [id number]   6/21/2022  2:30 PM Merrilonnie Pitto, PT [facility]   6/23/2022 11:00 AM Merril [place], PT [facility]   6/28/2022  2:00 PM Merril [place], PT [id number]   6/30/2022 11:00 AM Merril Pato, PT [facility]   7/5/2022  1:45 PM Merrilonnie Whyte, PT [facility]   7/7/2022 11:00 AM [place]il [place], PT [facility]   7/12/2022  2:00 PM Merril [place], PT [facility]   7/14/2022 11:00 AM [place]il [place], PT [facility]   7/19/2022  2:30 PM Merril Pato, PT [facility]   7/21/2022 11:00 AM Merril [place], PT [facility]   7/26/2022  2:00 PM JAKE Wing [facility]   7/28/2022 11:00 AM JAKE Wing [facility]   1/11/2023 10:30 AM [address] [place] [address]   1/11/2023 11:00 AM David Wright

## 2022-06-15 ENCOUNTER — HOSPITAL ENCOUNTER (OUTPATIENT)
Dept: PHYSICAL THERAPY | Age: 77
Discharge: HOME | End: 2022-06-15
Payer: MEDICARE

## 2022-06-15 PROCEDURE — 97140 MANUAL THERAPY 1/> REGIONS: CPT | Performed by: PHYSICAL THERAPIST

## 2022-06-15 PROCEDURE — 97110 THERAPEUTIC EXERCISES: CPT | Performed by: PHYSICAL THERAPIST

## 2022-06-15 NOTE — PROGRESS NOTES
[facility] PHYSICAL THERAPY - DAILY TREATMENT NOTE    Patient Name: Joanie Fraction        Date: 6/15/2022  : 1945   yes Patient  Verified  Visit #:     Insurance: Payor: BLUE [employer]OSS / Plan: [address] [place] [address] / Product Type: PPO /      In time: 315 Out time: 400   Total Treatment Time: 45     Medicare/BCBS Time Tracking (below)   Total Timed Codes (min):  45 1:1 Treatment Time:  45     TREATMENT AREA =  Right knee pain [M25.561]    SUBJECTIVE  Pain Level (on 0 to 10 scale):  2  / 10   Medication Changes/New allergies or changes in medical history, any new surgeries or procedures?    no  If yes, update Summary List   Subjective Functional Status/Changes:  []  No changes reported     cissie got me to 104 this morning         OBJECTIVE    30 min Therapeutic Exercise:  [x]  See flow sheet   Rationale:      increase ROM and increase strength to improve the patients ability to complete adls     15 min Manual Therapy: Dmt/stretch quad, knee flexion prom   Rationale:      decrease pain, increase ROM, increase tissue extensibility and decrease trigger points to improve patient's ability to squat  The manual therapy interventions were performed at a separate and distinct time from the therapeutic activities interventions.       Billed With/As:   [] TE   [] TA   [] Neuro   [] Self Care Patient Education: [x] Review HEP    [] Progressed/Changed HEP based on:   [] positioning   [] body mechanics   [] transfers   [] heat/ice application    [] other:      Other Objective/Functional Measures:    Progressed te as per flow sheet - required ue A during valarie negotiation with swing phase of gait to maintain balance  Prom to 0     Post Treatment Pain Level (on 0 to 10) scale:   2  / 10     ASSESSMENT  Assessment/Changes in Function:     No increase in pain following session      []  See Progress Note/Recertification   Patient will continue to benefit from skilled PT services to modify and progress therapeutic interventions, address functional mobility deficits, address ROM deficits, address strength deficits, analyze and address soft tissue restrictions, analyze and cue movement patterns, analyze and modify body mechanics/ergonomics, assess and modify postural abnormalities, address imbalance/dizziness and instruct in home and community integration to attain remaining goals.    Progress toward goals / Updated goals:    Pt to have wood on 6/21 - will return to daily PT to address rom restrictions      PLAN  []  Upgrade activities as tolerated yes Continue plan of care   []  Discharge due to :    []  Other:      Therapist: Hernando Muir, PT    Date: 6/15/2022 Time: 2:50 PM     Future Appointments   Date Time Provider Lisset Chawla   6/15/2022  3:45 PM Cent[place],  [facility]   6/21/2022  2:30 PM Centeon [place],  [facility]   6/22/2022  2:15 PM Centeno [place],  [facility]   6/23/2022 11:00 AM Centeno [place],  [facility]   6/24/2022 10:45 AM Centeno [place],  [facility]   6/27/2022  3:30 PM Centeno [place],  [facility]   6/28/2022  2:00 PM Centeno [place],  [facility]   6/29/2022  4:30 PM Centeno [place],  [facility]   6/30/2022 11:00 AM Cent[facility] [place],  [facility]   7/1/2022 10:45 AM Centeno [place],  [facility]   7/5/2022  1:45 PM Centeno [place],  [facility]   7/7/2022 11:00 AM Centeno [place],  [facility]   7/12/2022  2:00 PM Centeno [place],  [facility]   7/14/2022 11:00 AM Cent[place], PT [facility]   7/19/2022  2:30 PM Centeno [place],  [facility]   7/21/2022 11:00 AM Centeno [place], PT [facility]   7/26/2022  2:00 PM Centeno [place], PT [facility]   7/28/2022 11:00 AM Jacobo [place], PT [facility]   1/11/2023 10:30 AM [address] [place] [address]   1/11/2023 11:00 AM Waunita Goldberg, [address]

## 2022-06-21 ENCOUNTER — HOSPITAL ENCOUNTER (OUTPATIENT)
Dept: PHYSICAL THERAPY | Age: 77
Discharge: HOME | End: 2022-06-21
Payer: MEDICARE

## 2022-06-21 PROCEDURE — 97110 THERAPEUTIC EXERCISES: CPT | Performed by: PHYSICAL THERAPIST

## 2022-06-21 PROCEDURE — 97140 MANUAL THERAPY 1/> REGIONS: CPT | Performed by: PHYSICAL THERAPIST

## 2022-06-21 NOTE — PROGRESS NOTES
[facility] PHYSICAL THERAPY - DAILY TREATMENT NOTE    Patient Name: Remigio Hogan        Date: 2022  : 1945   yes Patient  Verified  Visit #:     Insurance: Payor: Del Acuna / Plan: VA MEDICARE PART A & B / Product Type: Medicare /      In time: 230 Out time: 300   Total Treatment Time: 30     Medicare/BCBS Time Tracking (below)   Total Timed Codes (min):  30 1:1 Treatment Time:  30     TREATMENT AREA =  Right knee pain [M25.561]    SUBJECTIVE  Pain Level (on 0 to 10 scale):   10   Medication Changes/New allergies or changes in medical history, any new surgeries or procedures?    no  If yes, update Summary List   Subjective Functional Status/Changes:  []  No changes reported     I think we might have pushed it a little too hard or I have been on it a lot because I am using my cane a lot more and in some more pain         OBJECTIVE    10 min Therapeutic Exercise:  [x]  See flow sheet   Rationale:      increase ROM and increase strength to improve the patients ability to complete adls     20 min Manual Therapy: Dmt/stretch quad, knee flexion prom   Rationale:      decrease pain, increase ROM, increase tissue extensibility and decrease trigger points to improve patient's ability to squat  The manual therapy interventions were performed at a separate and distinct time from the therapeutic activities interventions.       Billed With/As:   [] TE   [] TA   [] Neuro   [] Self Care Patient Education: [x] Review HEP    [] Progressed/Changed HEP based on:   [] positioning   [] body mechanics   [] transfers   [] heat/ice application    [] other:      Other Objective/Functional Measures:  Arrived to session with use of SPC and increased flexed knee gait compared to previous sessions  Increased redness/warmth compared to yesterday session        Post Treatment Pain Level (on 0 to 10) scale:   5  / 10     ASSESSMENT  Assessment/Changes in Function:       No changes post session with rom or pain levels []  See Progress Note/Recertification   Patient will continue to benefit from skilled PT services to modify and progress therapeutic interventions, address functional mobility deficits, address ROM deficits, address strength deficits, analyze and address soft tissue restrictions, analyze and cue movement patterns, analyze and modify body mechanics/ergonomics, assess and modify postural abnormalities, address imbalance/dizziness and instruct in home and community integration to attain remaining goals. Progress toward goals / Updated goals:    Pt to have wood this Friday.  Will assess response      PLAN  []  Upgrade activities as tolerated yes Continue plan of care   []  Discharge due to :    []  Other:      Therapist: Laure Gibson PT    Date: 6/21/2022 Time: 2:50 PM     Future Appointments   Date Time Provider Lisset Chawla   6/21/2022  2:30 PM Crystalyuly Dee, PT [facility]   6/22/2022  2:15 PM Crystal Kos, PT [facility]   6/23/2022 11:00 AM Crystal Leila, PT [facility]   6/24/2022 10:45 AM Crystal Leila, PT [facility]   6/27/2022  3:30 PM Crystal Leila, PT [facility]   6/28/2022  2:00 PM Crystal Leila, PT [facility]   6/29/2022  4:30 PM Crystal Leila, PT [facility]   6/30/2022 11:00 AM Crystal Leila, PT [facility]   7/1/2022 10:45 AM Crystal Leila, PT [facility]   7/5/2022  1:45 PM Crystal Leila, PT [facility]   7/7/2022 11:00 AM Crystal Leila, PT [facility]   7/12/2022  2:00 PM Crystalyuly Dee, PT [facility]   7/14/2022 11:00 AM Crystalyuly Dee, PT [facility]   7/19/2022  2:30 PM Crystalyuly Dee, PT [facility]   7/21/2022 11:00 AM Crystal Dee, PT [facility]   7/26/2022  2:00 PM Crystalyuly Dee, PT [facility]   7/28/2022 11:00 AM Crystal Dee, PT [facility]   1/11/2023 10:30 AM [address] [place] [address]   1/11/2023 11:00 AM Esteban Burrows, [address] [employer]

## 2022-06-22 ENCOUNTER — HOSPITAL ENCOUNTER (OUTPATIENT)
Dept: PHYSICAL THERAPY | Age: 77
Discharge: HOME | End: 2022-06-22
Payer: MEDICARE

## 2022-06-22 PROCEDURE — 97140 MANUAL THERAPY 1/> REGIONS: CPT | Performed by: PHYSICAL THERAPIST

## 2022-06-22 PROCEDURE — 97110 THERAPEUTIC EXERCISES: CPT | Performed by: PHYSICAL THERAPIST

## 2022-06-22 NOTE — PROGRESS NOTES
[facility] PHYSICAL THERAPY - DAILY TREATMENT NOTE    Patient Name: Familia Elizabeth        Date: 2022  : 1945   yes Patient  Verified  Visit #:     Insurance: Payor: Cindy Calender / Plan: [place] MEDICARE PART A & B / Product Type: Medicare /      In time: 210 Out time: 305   Total Treatment Time: 55     Medicare/BCBS Time Tracking (below)   Total Timed Codes (min):  55 1:1 Treatment Time:  55     TREATMENT AREA =  Right knee pain [M25.561]    SUBJECTIVE  Pain Level (on 0 to 10 scale):  4 / 10   Medication Changes/New allergies or changes in medical history, any new surgeries or procedures?    no  If yes, update Summary List   Subjective Functional Status/Changes:  []  No changes reported     Still really sore from when cissie pushed my knee aroun        OBJECTIVE    30 min Therapeutic Exercise:  [x]  See flow sheet   Rationale:      increase ROM and increase strength to improve the patients ability to complete adls     25 min Manual Therapy: Dmt/stretch quad, knee flexion prom   Rationale:      decrease pain, increase ROM, increase tissue extensibility and decrease trigger points to improve patient's ability to squat  The manual therapy interventions were performed at a separate and distinct time from the therapeutic activities interventions.       Billed With/As:   [] TE   [] TA   [] Neuro   [] Self Care Patient Education: [x] Review HEP    [] Progressed/Changed HEP based on:   [] positioning   [] body mechanics   [] transfers   [] heat/ice application    [] other:      Other Objective/Functional Measures:    Progressed te as per flow sheet - required ue A during valarie negotiation with swing phase of gait to maintain balance  Prom to -8-99     Post Treatment Pain Level (on 0 to 10) scale:   4  / 10     ASSESSMENT  Assessment/Changes in Function:     No increase in pain following session      []  See Progress Note/Recertification   Patient will continue to benefit from skilled PT services to modify and progress therapeutic interventions, address functional mobility deficits, address ROM deficits, address strength deficits, analyze and address soft tissue restrictions, analyze and cue movement patterns, analyze and modify body mechanics/ergonomics, assess and modify postural abnormalities, address imbalance/dizziness and instruct in home and community integration to attain remaining goals.    Progress toward goals / Updated goals:    Pt to have wood on 6/24 - will return to daily PT to address rom restrictions      PLAN  []  Upgrade activities as tolerated yes Continue plan of care   []  Discharge due to :    []  Other:      Therapist: Chely Benson PT    Date: 6/22/2022 Time: 2:50 PM     Future Appointments   Date Time Provider Lisset Chawla   6/23/2022 11:00 AM Michelet Harris, PT [id number]   6/24/2022 10:45 AM Michelet Harris, PT [place] [facility]   6/27/2022  3:30 PM Michelet Harris PT [facility] [place]   6/28/2022  2:00 PM Michelet Harris PT [facility] [place]   6/29/2022  4:30 PM Michelet Harris, PT [facility] [place]   6/30/2022 11:00 AM Michelet Harris PT [facility] [place]   7/1/2022 10:45 AM Michelet Harris PT [facility] [place]   7/5/2022  1:45 PM Michelet Harris PT [facility] [place]   7/7/2022 11:00 AM Michelet Harris PT [facility] [place]   7/12/2022  2:00 PM Michelet Harris PT [facility] [place]   7/14/2022 11:00 AM Michelet Harris, JA[facility]   7/19/2022  2:30 PM Michelet Harris PT [place] [facility]   7/21/2022 11:00 AM [facility]   7/26/2022  2:00 PM JAKE Nunez [facility]   7/28/2022 11:00 AM JAKE Nunez [facility]   1/11/2023 10:30 AM [address]   1/11/2023 11:00 AM David Brooks

## 2022-06-23 ENCOUNTER — APPOINTMENT (OUTPATIENT)
Dept: PHYSICAL THERAPY | Age: 77
End: 2022-06-23
Payer: MEDICARE

## 2022-06-24 ENCOUNTER — HOSPITAL ENCOUNTER (OUTPATIENT)
Dept: PHYSICAL THERAPY | Age: 77
Discharge: HOME | End: 2022-06-24
Payer: MEDICARE

## 2022-06-24 PROCEDURE — 97140 MANUAL THERAPY 1/> REGIONS: CPT | Performed by: PHYSICAL THERAPIST

## 2022-06-24 NOTE — PROGRESS NOTES
[facility] PHYSICAL THERAPY - DAILY TREATMENT NOTE    Patient Name: Shen Flores        Date: 2022  : 1945   yes Patient  Verified  Visit #:     Insurance: Payor: Greta Nation / Plan: [place] MEDICARE PART A & B / Product Type: Medicare /      In time: 6193 Out time: 452   Total Treatment Time: 25     Medicare/BCBS Time Tracking (below)   Total Timed Codes (min):  55 1:1 Treatment Time:  55     TREATMENT AREA =  Right knee pain [M25.561]    SUBJECTIVE  Pain Level (on 0 to 10 scale):  4 / 10   Medication Changes/New allergies or changes in medical history, any new surgeries or procedures?    no  If yes, update Summary List   Subjective Functional Status/Changes:  []  No changes reported     Per wife surgeon said its too far gone to make any changes with rom - said he pushed as hard as he could but he was afraid he was going to break the femur        OBJECTIVE        25 min Manual Therapy: Dmt/stretch quad, knee flexion prom   Rationale:      decrease pain, increase ROM, increase tissue extensibility and decrease trigger points to improve patient's ability to squat  The manual therapy interventions were performed at a separate and distinct time from the therapeutic activities interventions. Billed With/As:   [] TE   [] TA   [] Neuro   [] Self Care Patient Education: [x] Review HEP    [] Progressed/Changed HEP based on:   [] positioning   [] body mechanics   [] transfers   [] heat/ice application    [] other:      Other Objective/Functional Measures:    Arrived to session immediately following wood - pt was alert and oriented to place but not time.  Wife and pt consented to rx  80 prom at end of session      Post Treatment Pain Level (on 0 to 10) scale:   4  / 10     ASSESSMENT  Assessment/Changes in Function:     No increase in pain following session but advised both pt and wife it is imperative to perform prom 4x/day following wood protocol      []  See Progress Note/Recertification   Patient will continue to benefit from skilled PT services to modify and progress therapeutic interventions, address functional mobility deficits, address ROM deficits, address strength deficits, analyze and address soft tissue restrictions, analyze and cue movement patterns, analyze and modify body mechanics/ergonomics, assess and modify postural abnormalities, address imbalance/dizziness and instruct in home and community integration to attain remaining goals.    Progress toward goals / Updated goals:  Pt to attempt prom I over 43 hours due to weekend - will follow up in clinic on Monday and resume daily prom     PLAN  []  Upgrade activities as tolerated yes Continue plan of care   []  Discharge due to :    []  Other:      Therapist: Veda Gutierrez PT    Date: 6/24/2022 Time: 2:50 PM     Future Appointments   Date Time Provider Lisset Chawla   6/27/2022  3:30 PM Shagufta Ozuna,  [address] [facility]   6/28/2022  2:00 PM Michael Crespo [address] [facility]   6/30/2022 11:00 AM [facility]ukes[facility]  [facility]   7/1/2022 10:45 AM Shagufta Lukes,  [facility]   7/5/2022  1:45 PM Matheson Lukes,  [facility]   7/7/2022 11:00 AM Matheson Lukes,  [facility]   7/12/2022  2:00 PM Shagufta Lukes,  [facility]   7/14/2022 11:00 AM [facility]   7/19/2022  2:30 PM Shagufta Deakes,  [facility]   7/21/2022 11:00 AM Matheson Lukes, PT [facility]   7/26/2022  2:00 PM Matheson Deakes,  [facility]   7/28/2022 11:00 AM Shagufta Ozuna PT [facility]   1/11/2023 10:30 AM [facility] [address] [facility]   1/11/2023 11:00 AM David Merrill 27

## 2022-06-27 ENCOUNTER — HOSPITAL ENCOUNTER (OUTPATIENT)
Dept: PHYSICAL THERAPY | Age: 77
Discharge: HOME | End: 2022-06-27
Payer: MEDICARE

## 2022-06-27 PROCEDURE — 97110 THERAPEUTIC EXERCISES: CPT | Performed by: PHYSICAL THERAPIST

## 2022-06-27 PROCEDURE — 97140 MANUAL THERAPY 1/> REGIONS: CPT | Performed by: PHYSICAL THERAPIST

## 2022-06-27 NOTE — PROGRESS NOTES
[facility]  PHYSICAL THERAPY - DAILY TREATMENT NOTE    Patient Name: Tasneem Caba        Date: 2022  : 1945   yes Patient  Verified  Visit #:     Insurance: Payor: Kayley Wolff / Plan: [place] MEDICARE PART A & B / Product Type: Medicare /      In time: 330 Out time: 410   Total Treatment Time: 40     Medicare/BCBS Time Tracking (below)   Total Timed Codes (min): 40 1:1 Treatment Time:  40     TREATMENT AREA =  Right knee pain [M25.561]    SUBJECTIVE  Pain Level (on 0 to 10 scale):  3  10   Medication Changes/New allergies or changes in medical history, any new surgeries or procedures?    no  If yes, update Summary List   Subjective Functional Status/Changes:  []  No changes reported   Actually not too bad over the weekend       OBJECTIVE    15 min Therapeutic Exercise:  [x]  See flow sheet   Rationale:      increase ROM and increase strength to improve the patients ability to complete adls     25 min Manual Therapy: Dmt/stretch quad, knee flexion prom   Rationale:      decrease pain, increase ROM, increase tissue extensibility and decrease trigger points to improve patient's ability to squat  The manual therapy interventions were performed at a separate and distinct time from the therapeutic activities interventions.       Billed With/As:   [] TE   [] TA   [] Neuro   [] Self Care Patient Education: [x] Review HEP    [] Progressed/Changed HEP based on:   [] positioning   [] body mechanics   [] transfers   [] heat/ice application    [] other:      Other Objective/Functional Measures:  Circumferential measurements 41cm mid patella 43 cm proximal incision, 37 cm distal incision  Full prom at end of session      Post Treatment Pain Level (on 0 to 10) scale:   3 10     ASSESSMENT  Assessment/Changes in Function:     Improved tolerance to session      []  See Progress Note/Recertification   Patient will continue to benefit from skilled PT services to modify and progress therapeutic interventions, address functional mobility deficits, address ROM deficits, address strength deficits, analyze and address soft tissue restrictions, analyze and cue movement patterns, analyze and modify body mechanics/ergonomics, assess and modify postural abnormalities, address imbalance/dizziness and instruct in home and community integration to attain remaining goals.    Progress toward goals / Updated goals:    Progress with  Edema control this session      PLAN  []  Upgrade activities as tolerated yes Continue plan of care   []  Discharge due to :    []  Other:      Therapist: Danita Yung PT    Date: 6/27/2022 Time: 2:50 PM     Future Appointments   Date Time Provider Lisset Chawla   6/27/2022  3:30 PM Lulu Penta, PT [id number]   6/28/2022  2:00 PM Yenifer Brando [facility]   6/30/2022 11:00 AM Lulu Penta, PT [facility]   7/1/2022 10:45 AM Lulu Penta, PT [facility]   7/5/2022  1:45 PM Lulu Penta, PT [facility]   7/7/2022 11:00 AM Lulu Penta, PT [facility]   7/12/2022  2:00 PM Lulu Penta, PT [facility]   7/14/2022 11:00 AM Lulu Penta, PT [facility]   7/19/2022  2:30 PM Lulu Penta, PT [facility]   7/21/2022 11:00 AM Lulu Penta, PT [facility]   7/26/2022  2:00 PM Lulu Penta, PT [facility]   7/28/2022 11:00 AM Lulu Penta, PT [facility]   1/11/2023 10:30 AM [address]   1/11/2023 11:00 AM David Banegas 27

## 2022-06-28 ENCOUNTER — HOSPITAL ENCOUNTER (OUTPATIENT)
Dept: PHYSICAL THERAPY | Age: 77
Discharge: HOME | End: 2022-06-28
Payer: MEDICARE

## 2022-06-28 PROCEDURE — 97110 THERAPEUTIC EXERCISES: CPT | Performed by: PHYSICAL THERAPIST

## 2022-06-28 PROCEDURE — 97140 MANUAL THERAPY 1/> REGIONS: CPT | Performed by: PHYSICAL THERAPIST

## 2022-06-28 NOTE — PROGRESS NOTES
[facility] PHYSICAL THERAPY - DAILY TREATMENT NOTE    Patient Name: Марина Marsh        Date: 2022  : 1945   yes Patient  Verified  Visit #:     Insurance: Payor: León Goode / Plan: VA MEDICARE PART A & B / Product Type: Medicare /      In time: 200 Out time: 240   Total Treatment Time: 40     Medicare/BCBS Time Tracking (below)   Total Timed Codes (min): 40 1:1 Treatment Time:  40     TREATMENT AREA =  Right knee pain [M25.561]    SUBJECTIVE  Pain Level (on 0 to 10 scale):  3  10   Medication Changes/New allergies or changes in medical history, any new surgeries or procedures?    no  If yes, update Summary List   Subjective Functional Status/Changes:  []  No changes reported   Feeling like I am moving better        OBJECTIVE    25 min Therapeutic Exercise:  [x]  See flow sheet   Rationale:      increase ROM and increase strength to improve the patients ability to complete adls     15 min Manual Therapy: Dmt/stretch quad, knee flexion prom   Rationale:      decrease pain, increase ROM, increase tissue extensibility and decrease trigger points to improve patient's ability to squat  The manual therapy interventions were performed at a separate and distinct time from the therapeutic activities interventions.       Billed With/As:   [] TE   [] TA   [] Neuro   [] Self Care Patient Education: [x] Review HEP    [] Progressed/Changed HEP based on:   [] positioning   [] body mechanics   [] transfers   [] heat/ice application    [] other:      Other Objective/Functional Measures:  aded in prone quad stretch without c/o  te as per flow sheet with only min cues required for form   Post Treatment Pain Level (on 0 to 10) scale:   2 10     ASSESSMENT  Assessment/Changes in Function:     No increase in pain following session    []  See Progress Note/Recertification   Patient will continue to benefit from skilled PT services to modify and progress therapeutic interventions, address functional mobility deficits, address ROM deficits, address strength deficits, analyze and address soft tissue restrictions, analyze and cue movement patterns, analyze and modify body mechanics/ergonomics, assess and modify postural abnormalities, address imbalance/dizziness and instruct in home and community integration to attain remaining goals.    Progress toward goals / Updated goals:    Progress with rom     PLAN  []  Upgrade activities as tolerated yes Continue plan of care   []  Discharge due to :    []  Other:      Therapist: Deep Shepard PT    Date: 6/28/2022 Time: 2:50 PM     Future Appointments   Date Time Provider Lisset Chawla   6/28/2022  2:00 PM Jd Credit, PT [id number]   6/30/2022 11:00 AM Jd Credit, PT [facility]   7/1/2022 10:45 AM Jd Credit, PT [facility]   7/5/2022  1:45 PM Jd Credit, PT [facility]   7/7/2022 11:00 AM Jd Credit, PT [facility]   7/12/2022  2:00 PM Jd Credit, PT [facility]   7/14/2022 11:00 AM Jd Credit, PT [facility]   7/19/2022  2:30 PM Jd Credit, PT [facility]   7/21/2022 11:00 AM Jd Credit, PT [facility]   7/26/2022  2:00 PM Jd Credit, PT [facility]   7/28/2022 11:00 AM Jd Credit, PT [facility]   1/11/2023 10:30 AM [facility] [address]   1/11/2023 11:00 AM David Murray

## 2022-06-29 ENCOUNTER — APPOINTMENT (OUTPATIENT)
Dept: PHYSICAL THERAPY | Age: 77
End: 2022-06-29
Payer: MEDICARE

## 2022-06-29 ENCOUNTER — HOSPITAL ENCOUNTER (OUTPATIENT)
Dept: PHYSICAL THERAPY | Age: 77
Discharge: HOME | End: 2022-06-29
Payer: MEDICARE

## 2022-06-29 PROCEDURE — 97140 MANUAL THERAPY 1/> REGIONS: CPT | Performed by: PHYSICAL THERAPIST

## 2022-06-29 NOTE — PROGRESS NOTES
[facility] PHYSICAL THERAPY - DAILY TREATMENT NOTE    Patient Name: Silvino Codding        Date: 2022  : 1945   yes Patient  Verified  Visit #:     Insurance: Payor: BLUE CROSS / Plan: [address] [place] [address] / Product Type: PPO /      In time: 1100 Out time: 1145   Total Treatment Time: 45     Medicare/[facility] Time Tracking (below)   Total Timed Codes (min): 45 1:1 Treatment Time:  45     TREATMENT AREA =  Right knee pain [M25.561]    SUBJECTIVE  Pain Level (on 0 to 10 scale):   10   Medication Changes/New allergies or changes in medical history, any new surgeries or procedures?    no  If yes, update Summary List   Subjective Functional Status/Changes:  []  No changes reported   Feel like it is less swollen        OBJECTIVE    30 min Therapeutic Exercise:  [x]  See flow sheet   Rationale:      increase ROM and increase strength to improve the patients ability to complete adls     15 min Manual Therapy: Dmt/stretch quad, knee extension  prom   Rationale:      decrease pain, increase ROM, increase tissue extensibility and decrease trigger points to improve patient's ability to squat  The manual therapy interventions were performed at a separate and distinct time from the therapeutic activities interventions.       Billed With/As:   [] TE   [] TA   [] Neuro   [] Self Care Patient Education: [x] Review HEP    [] Progressed/Changed HEP based on:   [] positioning   [] body mechanics   [] transfers   [] heat/ice application    [] other:      Other Objective/Functional Measures:  1:1 mt only   Progressed te as per flow sheet including wall squats which required cues to avoid listing - once cued able to achieve but decreased depth range   Post Treatment Pain Level (on 0 to 10) scale:  1/ 10     ASSESSMENT  Assessment/Changes in Function:     Reduction in pain after session    []  See Progress Note/Recertification   Patient will continue to benefit from skilled PT services to modify and progress therapeutic interventions, address functional mobility deficits, address ROM deficits, address strength deficits, analyze and address soft tissue restrictions, analyze and cue movement patterns, analyze and modify body mechanics/ergonomics, assess and modify postural abnormalities, address imbalance/dizziness and instruct in home and community integration to attain remaining goals.    Progress toward goals / Updated goals:  Progression with pain and edema control        PLAN  []  Upgrade activities as tolerated yes Continue plan of care   []  Discharge due to :    []  Other:      Therapist: Kaylin Son PT    Date: 6/29/2022 Time: 2:50 PM     Future Appointments   Date Time Provider Lisset Chawla   6/29/2022 11:00 AM Honey Valera, [facility] [address]   6/30/2022 11:00 AM Honey Valera PT [facility]   7/1/2022 10:45 AM Honey Valera PT [facility]   7/5/2022  1:45 PM Honey Valera PT [facility]   7/7/2022 11:00 AM Honey Valera PT [facility]   7/12/2022  2:00 PM Honey Valera PT [facility]   7/14/2022 11:00 AM Honey Valera PT [facility]   7/19/2022  2:30 PM Honey Valera PT [facility]   7/21/2022 11:00 AM Honey Valera PT [facility]   7/26/2022  2:00 PM Honey Valera PT [facility]   7/28/2022 11:00 AM Honey Valera PT [facility]   1/11/2023 10:30 AM [facility] [address] [facility]   1/11/2023 11:00 AM David Berman 27

## 2022-06-30 ENCOUNTER — HOSPITAL ENCOUNTER (OUTPATIENT)
Dept: PHYSICAL THERAPY | Age: 77
Discharge: HOME | End: 2022-06-30
Payer: MEDICARE

## 2022-06-30 PROCEDURE — 97110 THERAPEUTIC EXERCISES: CPT | Performed by: PHYSICAL THERAPIST

## 2022-06-30 PROCEDURE — 97140 MANUAL THERAPY 1/> REGIONS: CPT | Performed by: PHYSICAL THERAPIST

## 2022-06-30 NOTE — PROGRESS NOTES
Julianne Caraballo PHYSICAL THERAPY - DAILY TREATMENT NOTE    Patient Name: Rafy Caro        Date: 2022  : 1945   yes Patient  Verified  Visit #:     Insurance: Payor: Sarah Amador / Plan: [place] MEDICARE PART A & B / Product Type: Medicare /      In time: 1100 Out time: 1130   Total Treatment Time: 30     Medicare/BCBS Time Tracking (below)   Total Timed Codes (min): 30 1:1 Treatment Time:  30     TREATMENT AREA =  Right knee pain [M25.561]    SUBJECTIVE  Pain Level (on 0 to 10 scale):  2/ 10   Medication Changes/New allergies or changes in medical history, any new surgeries or procedures?    no  If yes, update Summary List   Subjective Functional Status/Changes:  []  No changes reported   I have to be out of here in 30 minutes I have to take cissie to a dentist appt       OBJECTIVE    10 min Therapeutic Exercise:  [x]  See flow sheet   Rationale:      increase ROM and increase strength to improve the patients ability to complete adls     20 min Manual Therapy: Dmt/stretch quad, knee extension  prom   Rationale:      decrease pain, increase ROM, increase tissue extensibility and decrease trigger points to improve patient's ability to squat  The manual therapy interventions were performed at a separate and distinct time from the therapeutic activities interventions. Billed With/As:   [] TE   [] TA   [] Neuro   [] Self Care Patient Education: [x] Review HEP    [] Progressed/Changed HEP based on:   [] positioning   [] body mechanics   [] transfers   [] heat/ice application    [] other:      Other Objective/Functional Measures:  Modified session due to pt time constraint.    Able to achieve full passive extension    Post Treatment Pain Level (on 0 to 10) scale:  1/ 10     ASSESSMENT  Assessment/Changes in Function:     Reduction in pain after session    []  See Progress Note/Recertification   Patient will continue to benefit from skilled PT services to modify and progress therapeutic interventions, address functional mobility deficits, address ROM deficits, address strength deficits, analyze and address soft tissue restrictions, analyze and cue movement patterns, analyze and modify body mechanics/ergonomics, assess and modify postural abnormalities, address imbalance/dizziness and instruct in home and community integration to attain remaining goals.    Progress toward goals / Updated goals:  Progression with pain and edema control        PLAN  []  Upgrade activities as tolerated yes Continue plan of care   []  Discharge due to :    []  Other:      Therapist: Jessika Loco PT    Date: 6/30/2022 Time: 2:50 PM     Future Appointments   Date Time Provider Lisset Chawla   7/1/2022 10:45 AM Hortencia Coburn, PT [facility]   7/5/2022  1:45 PM Hortencia Coburn, PT [facility]   7/7/2022 11:00 AM Hortencia Coburn PT [facility]   7/12/2022  2:00 PM Hortencia Coburn PT [facility]   7/14/2022 11:00 AM Hortencia Coburn, PT [facility]   7/19/2022  2:30 PM Hortencia Coburn, PT [facility]   7/21/2022 11:00 AM Hortencia Coburn PT [facility]   7/26/2022  2:00 PM Hortencia Coburn PT [facility]   7/28/2022 11:00 AM Hortencia Coburn PT [facility]   1/11/2023 10:30 AM [facility] [address]   1/11/2023 11:00 AM David Richard 27

## 2022-07-01 ENCOUNTER — HOSPITAL ENCOUNTER (OUTPATIENT)
Dept: PHYSICAL THERAPY | Age: 77
Discharge: HOME | End: 2022-07-01
Payer: MEDICARE

## 2022-07-01 PROCEDURE — 97140 MANUAL THERAPY 1/> REGIONS: CPT | Performed by: PHYSICAL THERAPIST

## 2022-07-01 NOTE — PROGRESS NOTES
Ginna Renee PHYSICAL THERAPY - DAILY TREATMENT NOTE    Patient Name: Ariela Arreguin        Date: 2022  : 1945   yes Patient  Verified  Visit #:     Insurance: Payor: Tonie Westfall / Plan: [place] MEDICARE PART A & B / Product Type: Medicare /      In time: 9874 Out time: 1130   Total Treatment Time: 45     Medicare/BCBS Time Tracking (below)   Total Timed Codes (min): 45 1:1 Treatment Time:  45     TREATMENT AREA =  Right knee pain [M25.561]    SUBJECTIVE  Pain Level (on 0 to 10 scale):  2/ 10   Medication Changes/New allergies or changes in medical history, any new surgeries or procedures?    no  If yes, update Summary List   Subjective Functional Status/Changes:  []  No changes reported     im not sleeping well besides that feeling fine     OBJECTIVE    25 min Therapeutic Exercise:  [x]  See flow sheet   Rationale:      increase ROM and increase strength to improve the patients ability to complete adls     20 min Manual Therapy: Dmt/stretch quad, knee extension  prom   Rationale:      decrease pain, increase ROM, increase tissue extensibility and decrease trigger points to improve patient's ability to squat  The manual therapy interventions were performed at a separate and distinct time from the therapeutic activities interventions.       Billed With/As:   [] TE   [] TA   [] Neuro   [] Self Care Patient Education: [x] Review HEP    [] Progressed/Changed HEP based on:   [] positioning   [] body mechanics   [] transfers   [] heat/ice application    [] other:      Other Objective/Functional Measures:  1:1 mt only  Full prom knee extension with reports post knee discomfort    Post Treatment Pain Level (on 0 to 10) scale:  1/ 10     ASSESSMENT  Assessment/Changes in Function:     Ambulated out of clinic with near symmetrical gait pattern    []  See Progress Note/Recertification   Patient will continue to benefit from skilled PT services to modify and progress therapeutic interventions, address functional mobility deficits, address ROM deficits, address strength deficits, analyze and address soft tissue restrictions, analyze and cue movement patterns, analyze and modify body mechanics/ergonomics, assess and modify postural abnormalities, address imbalance/dizziness and instruct in home and community integration to attain remaining goals. Progress toward goals / Updated goals:    Progress with rom.  Pt to attempt on own 3 days due to holiday - will assess carry over of rom     PLAN  []  Upgrade activities as tolerated yes Continue plan of care   []  Discharge due to :    []  Other:      Therapist: Yadira Tillman PT    Date: 7/1/2022 Time: 2:50 PM     Future Appointments   Date Time Provider Lisset Chawla   7/1/2022 10:45 AM Pasha Michael, PT [facility]   7/5/2022  1:45 PM Pasha Michael, PT [facility]   7/7/2022 11:00 AM Pasha Michael, PT [facility]   7/12/2022  2:00 PM Lea Paz [address]   7/14/2022 11:00 AM Pasha Michael, PT [address]   7/19/2022  2:30 PM Pasha Michael, PT [facility]   7/21/2022 11:00 AM Pasha Michael PT [facility]   7/26/2022  2:00 PM Pasha Michael PT [facility]   7/28/2022 11:00 AM Pasha Michael, PT [facility]   1/11/2023 10:30 AM [address] [place] [address]   1/11/2023 11:00 AM David Velez

## 2022-07-05 ENCOUNTER — HOSPITAL ENCOUNTER (OUTPATIENT)
Dept: PHYSICAL THERAPY | Age: 77
Discharge: HOME | End: 2022-07-05
Payer: MEDICARE

## 2022-07-05 PROCEDURE — 97110 THERAPEUTIC EXERCISES: CPT | Performed by: PHYSICAL THERAPIST

## 2022-07-05 NOTE — PROGRESS NOTES
Roberto Reyes PHYSICAL THERAPY - DAILY TREATMENT NOTE    Patient Name: Bassem Reyna        Date: 2022  : 1945   yes Patient  Verified  Visit #:     Insurance: Payor: Heath Winkler / Plan: [place] MEDICARE PART A & B / Product Type: Medicare /      In time: 144 Out time: 134   Total Treatment Time: 50     Medicare/BCBS Time Tracking (below)   Total Timed Codes (min): 50 1:1 Treatment Time:  50     TREATMENT AREA =  Right knee pain [M25.561]    SUBJECTIVE  Pain Level (on 0 to 10 scale):  1/ 10   Medication Changes/New allergies or changes in medical history, any new surgeries or procedures?    no  If yes, update Summary List   Subjective Functional Status/Changes:  []  No changes reported     See pn     OBJECTIVE    30 min Therapeutic Exercise:  [x]  See flow sheet   Rationale:      increase ROM and increase strength to improve the patients ability to complete adls     20 min Manual Therapy: Dmt/stretch quad, knee extension  prom   Rationale:      decrease pain, increase ROM, increase tissue extensibility and decrease trigger points to improve patient's ability to squat  The manual therapy interventions were performed at a separate and distinct time from the therapeutic activities interventions.       Billed With/As:   [] TE   [] TA   [] Neuro   [] Self Care Patient Education: [x] Review HEP    [] Progressed/Changed HEP based on:   [] positioning   [] body mechanics   [] transfers   [] heat/ice application    [] other:      Other Objective/Functional Measures:  1:1 te only  See pn   Post Treatment Pain Level (on 0 to 10) scale:   10     ASSESSMENT  Assessment/Changes in Function:   See pn   []  See Progress Note/Recertification   Patient will continue to benefit from skilled PT services to modify and progress therapeutic interventions, address functional mobility deficits, address ROM deficits, address strength deficits, analyze and address soft tissue restrictions, analyze and cue movement patterns, analyze and modify body mechanics/ergonomics, assess and modify postural abnormalities, address imbalance/dizziness and instruct in home and community integration to attain remaining goals.    Progress toward goals / Updated goals:    See pn     PLAN  []  Upgrade activities as tolerated yes Continue plan of care   []  Discharge due to :    []  Other:      Therapist: Low Womack PT    Date: 7/5/2022 Time: 2:50 PM     Future Appointments   Date Time Provider Lisset Chawla   7/7/2022 11:00 AM JAKE [address]   7/14/2022 11:00 AM Virginie Davis PT [facility]   7/19/2022  2:30 PM Virginie Davis PT [facility]   7/21/2022 11:00 AM Virginie Davis PT [facility]   7/26/2022  2:00 PM Virginie Davis PT [facility]   7/28/2022 11:00 AM Virginie Davis PT [facility]   1/11/2023 10:30 AM  [address]   1/11/2023 11:00 AM David Rosa 27

## 2022-07-05 NOTE — PROGRESS NOTES
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 1.  Negotiate stairs with reciprocal gait pattern    Status at last Eval: Step to  Current Status:  yes   2.  arom -4 - 108 to facilitate improved gait mechanics with ambulation    Status at last Eval: -3-98 Current Status: 0-112 yes   3. Increase R knee strength 4+/5 to navigate stairs appropriately    Status at last Eval: 4-/5 Current Status: 4-/5 progressing     Key Functional Changes/Progress: Pt had LATA and is now on his 5th visit following procedure. He is able to initiate heel/toe strike during gait and achieve tke during stance phase. His arom 0-112 vs 0-119 prom but with decreased pain/edema at end range. He has improved his functional mobility including ability to drive, reciprocal stair negotiation and squats >/=65 to A with sit<>stand transfers.    Problem List: pain affecting function, decrease ROM, decrease strength, edema affecting function, impaired gait/ balance, decrease ADL/ functional abilitiies, decrease activity tolerance, decrease flexibility/ joint mobility and decrease transfer abilities   Treatment Plan may include any combination of the following: Therapeutic exercise, Therapeutic activities, Neuromuscular re-education, Physical agent/modality, Gait/balance training, Manual therapy, Patient education, Self Care training, Functional mobility training and Home safety training  Patient Goal(s) has been updated and includes:      Goals for this certification period include and are to be achieved in   4  weeks:  1. Achieve goal #3  2. Increase knee arom 120 to A with squats  3. I with advanced hep in order to prepare for dc   Frequency / Duration:   Patient to be seen   2-5   times per week for   4    weeks:    Assessments/Recommendations: await your consult to determine if LATA is indicated. Will continue therapy frequency based on this visit   If you have any questions/comments please contact us directly at [phone]. Thank you for allowing us to assist in the care of your patient. Therapist Signature: Patience Brower PT Date: 5/7/1117   Certification Period:  Reporting Period: 5/12/22-8/10/22  6/10/22-7/5/22  Time: 6:52 AM   NOTE TO PHYSICIAN:  PLEASE COMPLETE THE ORDERS BELOW AND FAX TO   [facility] Physical Therapy: [phone]  If you are unable to process this request in 24 hours please contact our office: [phone]    ___ I have read the above report and request that my patient continue as recommended.   ___ I have read the above report and request that my patient continue therapy with the following changes/special instructions: ________________________________________________   ___ I have read the above report and request that my patient be discharged from therapy.      Physician Signature:        Date:       Time:    Mayank Mauricio MD

## 2022-07-06 ENCOUNTER — HOSPITAL ENCOUNTER (OUTPATIENT)
Dept: PHYSICAL THERAPY | Age: 77
Discharge: HOME | End: 2022-07-06
Payer: MEDICARE

## 2022-07-06 PROCEDURE — 97140 MANUAL THERAPY 1/> REGIONS: CPT | Performed by: PHYSICAL THERAPIST

## 2022-07-06 PROCEDURE — 97110 THERAPEUTIC EXERCISES: CPT | Performed by: PHYSICAL THERAPIST

## 2022-07-06 NOTE — PROGRESS NOTES
Nichole Catherine PHYSICAL THERAPY - DAILY TREATMENT NOTE    Patient Name: Armida Ariza        Date: 2022  : 1945   yes Patient  Verified  Visit #:     Insurance: Payor: VA MEDICARE / Plan: VA MEDICARE PART A & B / Product Type: Medicare /      In time: 2947 Out time: 6536   Total Treatment Time: 36     Medicare/BCBS Time Tracking (below)   Total Timed Codes (min): 36 1:1 Treatment Time:  36     TREATMENT AREA =  Right knee pain [M25.561]    SUBJECTIVE  Pain Level (on 0 to 10 scale):  1/ 10   Medication Changes/New allergies or changes in medical history, any new surgeries or procedures?    no  If yes, update Summary List   Subjective Functional Status/Changes:  []  No changes reported   I am feeling really good      OBJECTIVE    26 min Therapeutic Exercise:  [x]  See flow sheet   Rationale:      increase ROM and increase strength to improve the patients ability to complete adls     20 min Manual Therapy: Dmt/stretch quad, knee extension  prom   Rationale:      decrease pain, increase ROM, increase tissue extensibility and decrease trigger points to improve patient's ability to squat  The manual therapy interventions were performed at a separate and distinct time from the therapeutic activities interventions.       Billed With/As:   [] TE   [] TA   [] Neuro   [] Self Care Patient Education: [x] Review HEP    [] Progressed/Changed HEP based on:   [] positioning   [] body mechanics   [] transfers   [] heat/ice application    [] other:      Other Objective/Functional Measures:  Arom/prom flex 111/122 with end range ain reported  Able to negotiate 2 flights of stairs with reciprocal gait pattern without compensation    Post Treatment Pain Level (on 0 to 10) scale:  0/ 10     ASSESSMENT  Assessment/Changes in Function:   No adverse reactions following session    []  See Progress Note/Recertification   Patient will continue to benefit from skilled PT services to modify and progress therapeutic interventions, address functional mobility deficits, address ROM deficits, address strength deficits, analyze and address soft tissue restrictions, analyze and cue movement patterns, analyze and modify body mechanics/ergonomics, assess and modify postural abnormalities, address imbalance/dizziness and instruct in home and community integration to attain remaining goals.    Progress toward goals / Updated goals:    Progress towards ltg #2     PLAN  []  Upgrade activities as tolerated yes Continue plan of care   []  Discharge due to :    []  Other:      Therapist: Zhao Champion PT    Date: 7/6/2022 Time: 2:50 PM     Future Appointments   Date Time Provider Lisset Chawla   7/6/2022 11:45 AM Av Car, PT [facility]   7/7/2022 11:00 AM Av Car, PT [facility]   7/14/2022 11:00 AM Av Car, PT [address]   7/19/2022  2:30 PM Av Car, PT [facility]   7/21/2022 11:00 AM Av Car, PT [facility]   7/26/2022  2:00 PM Av Car, PT [facility]   7/28/2022 11:00 AM Av Car, PT [facility]   1/11/2023 10:30 AM [facility] [address]   1/11/2023 11:00 AM David Schroeder 27

## 2022-07-07 ENCOUNTER — HOSPITAL ENCOUNTER (OUTPATIENT)
Dept: PHYSICAL THERAPY | Age: 77
Discharge: HOME | End: 2022-07-07
Payer: MEDICARE

## 2022-07-07 PROCEDURE — 97140 MANUAL THERAPY 1/> REGIONS: CPT | Performed by: PHYSICAL THERAPIST

## 2022-07-07 NOTE — PROGRESS NOTES
Ginna Renee PHYSICAL THERAPY - DAILY TREATMENT NOTE    Patient Name: Ariela Arreguin        Date: 2022  : 1945   yes Patient  Verified  Visit #:      of   29  Insurance: Payor: Tonie Westfall / Plan: VA MEDICARE PART A & B / Product Type: Medicare /      In time: 1100 Out time: 1145   Total Treatment Time: 45     Medicare/BCBS Time Tracking (below)   Total Timed Codes (min): 45 1:1 Treatment Time:  15     TREATMENT AREA =  Right knee pain [M25.561]    SUBJECTIVE  Pain Level (on 0 to 10 scale):  1/ 10   Medication Changes/New allergies or changes in medical history, any new surgeries or procedures?    no  If yes, update Summary List   Subjective Functional Status/Changes:  []  No changes reported   I am feeling good      OBJECTIVE    30 min Therapeutic Exercise:  [x]  See flow sheet   Rationale:      increase ROM and increase strength to improve the patients ability to complete adls     15 min Manual Therapy: Dmt/stretch quad, knee extension  prom   Rationale:      decrease pain, increase ROM, increase tissue extensibility and decrease trigger points to improve patient's ability to squat  The manual therapy interventions were performed at a separate and distinct time from the therapeutic activities interventions.       Billed With/As:   [] TE   [] TA   [] Neuro   [] Self Care Patient Education: [x] Review HEP    [] Progressed/Changed HEP based on:   [] positioning   [] body mechanics   [] transfers   [] heat/ice application    [] other:      Other Objective/Functional Measures:  1:1 mt only  Progressed te as per flow sheet to include posterior chain strengthening - cues required for initial attempt   Post Treatment Pain Level (on 0 to 10) scale:  0/ 10     ASSESSMENT  Assessment/Changes in Function:   No increase in pain following session    []  See Progress Note/Recertification   Patient will continue to benefit from skilled PT services to modify and progress therapeutic interventions, address functional mobility deficits, address ROM deficits, address strength deficits, analyze and address soft tissue restrictions, analyze and cue movement patterns, analyze and modify body mechanics/ergonomics, assess and modify postural abnormalities, address imbalance/dizziness and instruct in home and community integration to attain remaining goals.    Progress toward goals / Updated goals:    Progress with strength goal      PLAN  []  Upgrade activities as tolerated yes Continue plan of care   []  Discharge due to :    []  Other:      Therapist: Rubina Jimenez PT    Date: 7/7/2022 Time: 2:50 PM     Future Appointments   Date Time Provider Lisset Chawla   7/7/2022 11:00 AM Umatilla Beams, PT [address]   7/14/2022 11:00 AM Umatilla Beams, PT [facility]   7/19/2022  2:30 PM Umatilla Beams, PT [facility]   7/21/2022 11:00 AM Umatilla Beams, PT [facility]   7/26/2022  2:00 PM Umatilla Beams, PT [facility]   7/28/2022 11:00 AM Umatilla Beams, PT [facility]   1/11/2023 10:30 AM  [address] [facility] [address]   1/11/2023 11:00 AM David Barraza 27

## 2022-07-12 ENCOUNTER — APPOINTMENT (OUTPATIENT)
Dept: PHYSICAL THERAPY | Age: 77
End: 2022-07-12
Payer: MEDICARE

## 2022-07-12 ENCOUNTER — HOSPITAL ENCOUNTER (OUTPATIENT)
Dept: PHYSICAL THERAPY | Age: 77
Discharge: HOME | End: 2022-07-12
Payer: MEDICARE

## 2022-07-12 PROCEDURE — 97140 MANUAL THERAPY 1/> REGIONS: CPT | Performed by: PHYSICAL THERAPIST

## 2022-07-12 NOTE — PROGRESS NOTES
[facility] PHYSICAL THERAPY - DAILY TREATMENT NOTE    Patient Name: John Chau        Date: 2022  : 1945   yes Patient  Verified  Visit #:      of   29  Insurance: Payor: Julius Cortes / Plan: [place] MEDICARE PART A & B / Product Type: Medicare /      In time: 5724 Out time: 1100   Total Treatment Time: 40     Medicare/BCBS Time Tracking (below)   Total Timed Codes (min): 40 1:1 Treatment Time:  15     TREATMENT AREA =  Right knee pain [M25.561]    SUBJECTIVE  Pain Level (on 0 to 10 scale):  1/ 10   Medication Changes/New allergies or changes in medical history, any new surgeries or procedures?    no  If yes, update Summary List   Subjective Functional Status/Changes:  []  No changes reported   Knee held up great over weekend     OBJECTIVE    25 min Therapeutic Exercise:  [x]  See flow sheet   Rationale:      increase ROM and increase strength to improve the patients ability to complete adls     15 min Manual Therapy: Dmt/stretch quad, knee extension  prom   Rationale:      decrease pain, increase ROM, increase tissue extensibility and decrease trigger points to improve patient's ability to squat  The manual therapy interventions were performed at a separate and distinct time from the therapeutic activities interventions. Billed With/As:   [] TE   [] TA   [] Neuro   [] Self Care Patient Education: [x] Review HEP    [] Progressed/Changed HEP based on:   [] positioning   [] body mechanics   [] transfers   [] heat/ice application    [] other:      Other Objective/Functional Measures:   Added in trx squats and pt able to obtain parallel without listing  1:1 mt only    Post Treatment Pain Level (on 0 to 10) scale:  0/ 10     ASSESSMENT  Assessment/Changes in Function:   No increase in pain following session    []  See Progress Note/Recertification   Patient will continue to benefit from skilled PT services to modify and progress therapeutic interventions, address functional mobility deficits, address ROM deficits, address strength deficits, analyze and address soft tissue restrictions, analyze and cue movement patterns, analyze and modify body mechanics/ergonomics, assess and modify postural abnormalities, address imbalance/dizziness and instruct in home and community integration to attain remaining goals.    Progress toward goals / Updated goals:    Progress with squat goal      PLAN  []  Upgrade activities as tolerated yes Continue plan of care   []  Discharge due to :    []  Other:      Therapist: Saira Marte PT    Date: 7/12/2022 Time: 2:50 PM     Future Appointments   Date Time Provider Lisset Chawla   7/12/2022 10:15 AM Ana Ours, PT [facility]   7/13/2022  2:15 PM Ana Ours, PT [facility]   7/14/2022 11:00 AM Ana Ours, PT [facility]   7/19/2022  2:30 PM Ana Ours, PT [facility]   7/21/2022 11:00 AM Ana Ours, PT [facility]   7/26/2022  2:00 PM Ana Ours, PT [facility]   7/27/2022  3:45 PM Ana Ours, PT [facility]   7/28/2022 11:00 AM Ana Ours, PT [facility]   1/11/2023 10:30 AM  [facility] [address]   1/11/2023 11:00 AM Barron Gallo, [address]

## 2022-07-13 ENCOUNTER — HOSPITAL ENCOUNTER (OUTPATIENT)
Dept: PHYSICAL THERAPY | Age: 77
Discharge: HOME | End: 2022-07-13
Payer: MEDICARE

## 2022-07-13 PROCEDURE — 97140 MANUAL THERAPY 1/> REGIONS: CPT | Performed by: PHYSICAL THERAPIST

## 2022-07-13 PROCEDURE — 97110 THERAPEUTIC EXERCISES: CPT | Performed by: PHYSICAL THERAPIST

## 2022-07-13 NOTE — PROGRESS NOTES
[facility] PHYSICAL THERAPY - DAILY TREATMENT NOTE    Patient Name: Raisa Godinez        Date: 2022  : 1945   yes Patient  Verified  Visit #:     Insurance: Payor: Jarocho Medici / Plan: [place] MEDICARE PART A & B / Product Type: Medicare /      In time: 541 Out time: 300   Total Treatment Time: 43     Medicare/BCBS Time Tracking (below)   Total Timed Codes (min): 43 1:1 Treatment Time:  43     TREATMENT AREA =  Right knee pain [M25.561]    SUBJECTIVE  Pain Level (on 0 to 10 scale):  1/ 10   Medication Changes/New allergies or changes in medical history, any new surgeries or procedures?    no  If yes, update Summary List   Subjective Functional Status/Changes:  []  No changes reported   Not too bad today      OBJECTIVE    28 min Therapeutic Exercise:  [x]  See flow sheet   Rationale:      increase ROM and increase strength to improve the patients ability to complete adls     15 min Manual Therapy: Hs/gastroc stretch, knee flexion    Rationale:      decrease pain, increase ROM, increase tissue extensibility and decrease trigger points to improve patient's ability to squat  The manual therapy interventions were performed at a separate and distinct time from the therapeutic activities interventions.       Billed With/As:   [] TE   [] TA   [] Neuro   [] Self Care Patient Education: [x] Review HEP    [] Progressed/Changed HEP based on:   [] positioning   [] body mechanics   [] transfers   [] heat/ice application    [] other:      Other Objective/Functional Measures:  Progressed posterior chain te as per flow sheet - challenged with sustained motion at 90/90 due to fatigue    Post Treatment Pain Level (on 0 to 10) scale:  0/ 10     ASSESSMENT  Assessment/Changes in Function:   No increase in pain following session    []  See Progress Note/Recertification   Patient will continue to benefit from skilled PT services to modify and progress therapeutic interventions, address functional mobility deficits, address ROM deficits, address strength deficits, analyze and address soft tissue restrictions, analyze and cue movement patterns, analyze and modify body mechanics/ergonomics, assess and modify postural abnormalities, address imbalance/dizziness and instruct in home and community integration to attain remaining goals.    Progress toward goals / Updated goals:    Progress with squat goal      PLAN  []  Upgrade activities as tolerated yes Continue plan of care   []  Discharge due to :    []  Other:      Therapist: Rubina Jimenez PT    Date: 7/13/2022 Time: 2:50 PM     Future Appointments   Date Time Provider Lisset Chawla   7/14/2022 11:00 AM Maciej Beams, PT [id number]   7/19/2022  2:30 PM [facility]   7/21/2022 11:00 AM [place] Beams, PT [facility]   7/26/2022  2:00 PM [facility]   7/27/2022  3:45 PM [place] Beams, PT [facility]   7/28/2022 11:00 AM Maciej Beams, PT [facility]   1/11/2023 10:30 AM [facility] [address]   1/11/2023 11:00 AM Didi Bennett, [address]

## 2022-07-14 ENCOUNTER — HOSPITAL ENCOUNTER (OUTPATIENT)
Dept: PHYSICAL THERAPY | Age: 77
Discharge: HOME | End: 2022-07-14
Payer: MEDICARE

## 2022-07-14 PROCEDURE — 97110 THERAPEUTIC EXERCISES: CPT | Performed by: PHYSICAL THERAPIST

## 2022-07-14 PROCEDURE — 97140 MANUAL THERAPY 1/> REGIONS: CPT | Performed by: PHYSICAL THERAPIST

## 2022-07-19 ENCOUNTER — HOSPITAL ENCOUNTER (OUTPATIENT)
Dept: PHYSICAL THERAPY | Age: 77
Discharge: HOME | End: 2022-07-19
Payer: MEDICARE

## 2022-07-19 PROCEDURE — 97110 THERAPEUTIC EXERCISES: CPT | Performed by: PHYSICAL THERAPIST

## 2022-07-19 PROCEDURE — 97140 MANUAL THERAPY 1/> REGIONS: CPT | Performed by: PHYSICAL THERAPIST

## 2022-07-19 NOTE — PROGRESS NOTES
[facility] PHYSICAL THERAPY - DAILY TREATMENT NOTE    Patient Name: Agatha Valverde        Date: 2022  : 1945   yes Patient  Verified  Visit #:  [id number] Gabriel Conti   of   29  Insurance: Payor: Emiliosiel Jolley / Plan: [place] MEDICARE PART A & B / Product Type: Medicare /      In time: 230 Out time: 315   Total Treatment Time: 45     Medicare/BCBS Time Tracking (below)   Total Timed Codes (min): 45 1:1 Treatment Time:  45     TREATMENT AREA =  Right knee pain [M25.561]    SUBJECTIVE  Pain Level (on 0 to 10 scale):  1/ 10   Medication Changes/New allergies or changes in medical history, any new surgeries or procedures?    no  If yes, update Summary List   Subjective Functional Status/Changes:  []  No changes reported   Went in the pool and did some exercises      OBJECTIVE    30 min Therapeutic Exercise:  [x]  See flow sheet   Rationale:      increase ROM and increase strength to improve the patients ability to complete adls     15 min Manual Therapy: Hs/gastroc stretch, knee flexion    Rationale:      decrease pain, increase ROM, increase tissue extensibility and decrease trigger points to improve patient's ability to squat  The manual therapy interventions were performed at a separate and distinct time from the therapeutic activities interventions.       Billed With/As:   [] TE   [] TA   [] Neuro   [] Self Care Patient Education: [x] Review HEP    [] Progressed/Changed HEP based on:   [] positioning   [] body mechanics   [] transfers   [] heat/ice application    [] other:      Other Objective/Functional Measures:  20 8\" step ups with good form and no use of UE for correction      Post Treatment Pain Level (on 0 to 10) scale:  0/ 10     ASSESSMENT  Assessment/Changes in Function:   No adverse reactions following session    []  See Progress Note/Recertification   Patient will continue to benefit from skilled PT services to modify and progress therapeutic interventions, address functional mobility deficits, address ROM deficits, address strength deficits, analyze and address soft tissue restrictions, analyze and cue movement patterns, analyze and modify body mechanics/ergonomics, assess and modify postural abnormalities, address imbalance/dizziness and instruct in home and community integration to attain remaining goals.    Progress toward goals / Updated goals:  D/c next week due to maximum gains with PT achieved        PLAN  []  Upgrade activities as tolerated yes Continue plan of care   []  Discharge due to :    []  Other:      Therapist: Shivani Alvarez PT    Date: 7/19/2022 Time: 2:50 PM     Future Appointments   Date Time Provider Lisset Chawla   7/21/2022 11:00 AM Elle Green, PT [facility]   7/26/2022  2:00 PM Rosendo Evans [facility]   7/27/2022  3:45 PM Elle Green PT [facility]   7/28/2022 11:00 AM Elle Green, PT [facility]   1/11/2023 10:30 AM [address] [place] [address]   1/11/2023 11:00 AM Alona Linares, [address]

## 2022-07-21 ENCOUNTER — HOSPITAL ENCOUNTER (OUTPATIENT)
Dept: PHYSICAL THERAPY | Age: 77
Discharge: HOME | End: 2022-07-21
Payer: MEDICARE

## 2022-07-21 PROCEDURE — 97140 MANUAL THERAPY 1/> REGIONS: CPT | Performed by: PHYSICAL THERAPIST

## 2022-07-21 PROCEDURE — 97110 THERAPEUTIC EXERCISES: CPT | Performed by: PHYSICAL THERAPIST

## 2022-07-21 NOTE — PROGRESS NOTES
[facility] PHYSICAL THERAPY - DAILY TREATMENT NOTE    Patient Name: Donna Alcocer        Date: 2022  : 1945   yes Patient  Verified  Visit #:  32   of   29  Insurance: Payor: Carolyn Dahliarocael / Plan: [place] MEDICARE PART A & B / Product Type: Medicare /      In time:  Out time: 77   Total Treatment Time: 47     Medicare/BCBS Time Tracking (below)   Total Timed Codes (min): 47 1:1 Treatment Time:  47     TREATMENT AREA =  Right knee pain [M25.561]    SUBJECTIVE  Pain Level (on 0 to 10 scale):  0/ 10   Medication Changes/New allergies or changes in medical history, any new surgeries or procedures?    no  If yes, update Summary List   Subjective Functional Status/Changes:  []  No changes reported   Did the stairs no problem      OBJECTIVE    32 min Therapeutic Exercise:  [x]  See flow sheet   Rationale:      increase ROM and increase strength to improve the patients ability to complete adls     15 min Manual Therapy: Hs/gastroc stretch, knee flexion    Rationale:      decrease pain, increase ROM, increase tissue extensibility and decrease trigger points to improve patient's ability to squat  The manual therapy interventions were performed at a separate and distinct time from the therapeutic activities interventions.       Billed With/As:   [] TE   [] TA   [] Neuro   [] Self Care Patient Education: [x] Review HEP    [] Progressed/Changed HEP based on:   [] positioning   [] body mechanics   [] transfers   [] heat/ice application    [] other:      Other Objective/Functional Measures:  Increased weights on all te without c/o    Post Treatment Pain Level (on 0 to 10) scale:  0/ 10     ASSESSMENT  Assessment/Changes in Function:   No adverse reactions following session    []  See Progress Note/Recertification   Patient will continue to benefit from skilled PT services to modify and progress therapeutic interventions, address functional mobility deficits, address ROM deficits, address strength deficits, analyze and address soft tissue restrictions, analyze and cue movement patterns, analyze and modify body mechanics/ergonomics, assess and modify postural abnormalities, address imbalance/dizziness and instruct in home and community integration to attain remaining goals.    Progress toward goals / Updated goals:  D/c next week due to maximum gains with PT achieved        PLAN  []  Upgrade activities as tolerated yes Continue plan of care   []  Discharge due to :    []  Other:      Therapist: Patience Brower PT    Date: 7/21/2022 Time: 2:50 PM     Future Appointments   Date Time Provider Lisset Chawla   7/21/2022 11:00 AM Hyun Olmedo PT [facility]   7/26/2022  2:00 PM Ike Simeon [facility]   7/27/2022  3:45 PM Hyun Olmedo PT [facility]   7/28/2022 11:00 AM Hyun Olmedo PT [facility]   1/11/2023 10:30 AM [address] [place] [address]   1/11/2023 11:00 AM David Barry 27

## 2022-07-25 ENCOUNTER — HOSPITAL ENCOUNTER (OUTPATIENT)
Dept: PHYSICAL THERAPY | Age: 77
Discharge: HOME | End: 2022-07-25
Payer: MEDICARE

## 2022-07-25 PROCEDURE — 97140 MANUAL THERAPY 1/> REGIONS: CPT | Performed by: PHYSICAL THERAPIST

## 2022-07-25 PROCEDURE — 97110 THERAPEUTIC EXERCISES: CPT | Performed by: PHYSICAL THERAPIST

## 2022-07-25 NOTE — PROGRESS NOTES
[facility]egha Aly PHYSICAL THERAPY - DAILY TREATMENT NOTE    Patient Name: Julio Tellez        Date: 2022  : 1945   yes Patient  Verified  Visit #:     Insurance: Payor: Yohan Risk / Plan: [place] MEDICARE PART A & B / Product Type: Medicare /      In time: 210 Out time: 255   Total Treatment Time: 45     Medicare/BCBS Time Tracking (below)   Total Timed Codes (min): 45 1:1 Treatment Time:  45     TREATMENT AREA =  Right knee pain [M25.561]    SUBJECTIVE  Pain Level (on 0 to 10 scale):  1/ 10   Medication Changes/New allergies or changes in medical history, any new surgeries or procedures?    no  If yes, update Summary List   Subjective Functional Status/Changes:  []  No changes reported   Just feel like I need strength and endurance      OBJECTIVE    30 min Therapeutic Exercise:  [x]  See flow sheet   Rationale:      increase ROM and increase strength to improve the patients ability to complete adls     15 min Manual Therapy: Dmt/stretch quad, knee extension  prom   Rationale:      decrease pain, increase ROM, increase tissue extensibility and decrease trigger points to improve patient's ability to squat  The manual therapy interventions were performed at a separate and distinct time from the therapeutic activities interventions.       Billed With/As:   [] TE   [] TA   [] Neuro   [] Self Care Patient Education: [x] Review HEP    [] Progressed/Changed HEP based on:   [] positioning   [] body mechanics   [] transfers   [] heat/ice application    [] other:      Other Objective/Functional Measures:  Able to perform squats to 65 without UT and parallel with UE   Post Treatment Pain Level (on 0 to 10) scale:  0/ 10     ASSESSMENT  Assessment/Changes in Function:   No increase in pain following session    []  See Progress Note/Recertification   Patient will continue to benefit from skilled PT services to modify and progress therapeutic interventions, address functional mobility deficits, address ROM deficits, address strength deficits, analyze and address soft tissue restrictions, analyze and cue movement patterns, analyze and modify body mechanics/ergonomics, assess and modify postural abnormalities, address imbalance/dizziness and instruct in home and community integration to attain remaining goals.    Progress toward goals / Updated goals:    D/c this week due max gains with PT     PLAN  []  Upgrade activities as tolerated yes Continue plan of care   []  Discharge due to :    []  Other:      Therapist: Collette Banks PT    Date: 7/25/2022 Time: 2:50 PM     Future Appointments   Date Time Provider Lisset Chawla   7/27/2022  3:45 PM Alisha Hollins PT [facility]   7/28/2022 11:00 AM Alisha Hollins PT [facility]   1/11/2023 10:30 AM [facility] [address]   1/11/2023 11:00 AM David Alamo [address]

## 2022-07-26 ENCOUNTER — APPOINTMENT (OUTPATIENT)
Dept: PHYSICAL THERAPY | Age: 77
End: 2022-07-26
Payer: MEDICARE

## 2022-07-27 ENCOUNTER — HOSPITAL ENCOUNTER (OUTPATIENT)
Dept: PHYSICAL THERAPY | Age: 77
Discharge: HOME | End: 2022-07-27
Payer: MEDICARE

## 2022-07-27 PROCEDURE — 97140 MANUAL THERAPY 1/> REGIONS: CPT | Performed by: PHYSICAL THERAPIST

## 2022-07-27 PROCEDURE — 97110 THERAPEUTIC EXERCISES: CPT | Performed by: PHYSICAL THERAPIST

## 2022-07-27 NOTE — PROGRESS NOTES
[facility]ol[facility] PHYSICAL THERAPY - DAILY TREATMENT NOTE    Patient Name: Chelsea Rodriguez        Date: 2022  : 1945   yes Patient  Verified  Visit #:   35   of   29  Insurance: Payor: Zuleima Smith / Plan: VA MEDICARE PART A & B / Product Type: Medicare /      In time: 345 Out time: 425   Total Treatment Time: 40     Medicare/BCBS Time Tracking (below)   Total Timed Codes (min): 40 1:1 Treatment Time:  40     TREATMENT AREA =  Right knee pain [M25.561]    SUBJECTIVE  Pain Level (on 0 to 10 scale):  1/ 10   Medication Changes/New allergies or changes in medical history, any new surgeries or procedures?    no  If yes, update Summary List   Subjective Functional Status/Changes:  []  No changes reported   Was able to jog up the stairs without any difficulty      OBJECTIVE    25 min Therapeutic Exercise:  [x]  See flow sheet   Rationale:      increase ROM and increase strength to improve the patients ability to complete adls     15 min Manual Therapy: Dmt/stretch quad, knee extension  prom   Rationale:      decrease pain, increase ROM, increase tissue extensibility and decrease trigger points to improve patient's ability to squat  The manual therapy interventions were performed at a separate and distinct time from the therapeutic activities interventions.       Billed With/As:   [] TE   [] TA   [] Neuro   [] Self Care Patient Education: [x] Review HEP    [] Progressed/Changed HEP based on:   [] positioning   [] body mechanics   [] transfers   [] heat/ice application    [] other:      Other Objective/Functional Measures:  Able to perform squats to 85 without UT and parallel with UE   Post Treatment Pain Level (on 0 to 10) scale:  0/ 10     ASSESSMENT  Assessment/Changes in Function:   No increase in pain following session    []  See Progress Note/Recertification   Patient will continue to benefit from skilled PT services to modify and progress therapeutic interventions, address functional mobility deficits, address ROM deficits, address strength deficits, analyze and address soft tissue restrictions, analyze and cue movement patterns, analyze and modify body mechanics/ergonomics, assess and modify postural abnormalities, address imbalance/dizziness and instruct in home and community integration to attain remaining goals.    Progress toward goals / Updated goals:    D/c next session due to max gains with PT     PLAN  []  Upgrade activities as tolerated yes Continue plan of care   []  Discharge due to :    []  Other:      Therapist: Emiliano Jameson PT    Date: 7/27/2022 Time: 2:50 PM     Future Appointments   Date Time Provider Lisset Chawla   7/28/2022 11:00 AM Castillo Ferirs, PT [facility]   1/11/2023 10:30 AM [facility] [address] [facility]   1/11/2023 11:00 AM David Veloz 27

## 2022-07-28 ENCOUNTER — HOSPITAL ENCOUNTER (OUTPATIENT)
Dept: PHYSICAL THERAPY | Age: 77
Discharge: HOME | End: 2022-07-28
Payer: MEDICARE

## 2022-07-28 PROCEDURE — 97110 THERAPEUTIC EXERCISES: CPT | Performed by: PHYSICAL THERAPIST

## 2022-07-28 PROCEDURE — 97140 MANUAL THERAPY 1/> REGIONS: CPT | Performed by: PHYSICAL THERAPIST

## 2022-07-28 NOTE — PROGRESS NOTES
[facility]  PHYSICAL THERAPY - DAILY TREATMENT NOTE    Patient Name: Tasneem Caba        Date: 2022  : 1945   yes Patient  Verified  Visit #:     Insurance: Payor: Kayley Angers / Plan: VA MEDICARE PART A & B / Product Type: Medicare /      In time: 1100 Out time: 1140   Total Treatment Time: 40     Medicare/BCBS Time Tracking (below)   Total Timed Codes (min): 40 1:1 Treatment Time:  40     TREATMENT AREA =  Right knee pain [M25.561]    SUBJECTIVE  Pain Level (on 0 to 10 scale):  1/ 10   Medication Changes/New allergies or changes in medical history, any new surgeries or procedures?    no  If yes, update Summary List   Subjective Functional Status/Changes:  []  No changes reported   See dc     OBJECTIVE    25 min Therapeutic Exercise:  [x]  See flow sheet   Rationale:      increase ROM and increase strength to improve the patients ability to complete adls     15 min Manual Therapy: Dmt/stretch quad, knee extension  prom   Rationale:      decrease pain, increase ROM, increase tissue extensibility and decrease trigger points to improve patient's ability to squat  The manual therapy interventions were performed at a separate and distinct time from the therapeutic activities interventions.       Billed With/As:   [] TE   [] TA   [] Neuro   [] Self Care Patient Education: [x] Review HEP    [] Progressed/Changed HEP based on:   [] positioning   [] body mechanics   [] transfers   [] heat/ice application    [] other:      Other Objective/Functional Measures:  See dc   Post Treatment Pain Level (on 0 to 10) scale:  0/ 10     ASSESSMENT  Assessment/Changes in Function:   See dc   []  See Progress Note/Recertification   Patient will continue to benefit from skilled PT services to see dc   Progress toward goals / Updated goals:  See dc     PLAN  []  Upgrade activities as tolerated yes Continue plan of care   []  Discharge due to :    []  Other:      Therapist: Lonny Cardenas, PT    Date: 2022 Time: 2:50 PM     Future Appointments   Date Time Provider Lisset Chawla   1/11/2023 10:30 AM [facility]   1/11/2023 11:00 AM Dora Vigil

## 2022-08-01 NOTE — PROGRESS NOTES
[address], [address],[facility], [facility] - Phone: [phone]  Fax: [phone] FROM PHYSICAL THERAPY                   Patient Name: Estevan Calderon : 1945   Treatment/Medical Diagnosis: Left shoulder pain [M25.512]   Onset Date: 2021     Referral Source: Francheska Marcos MD [place] [facility]): 2022   Prior Hospitalization: See Medical History Provider #: [id number]   Prior Level of Function: Chronic h/o neck pain, able to reach overhead with less pain   Comorbidities: Prostate Cancer (04'), LBP, HTN, L TKR, R Knee pain    Medications: Verified on Patient Summary List     [facility]: 8 Missed Visits: 0     Goal/Measure of Progress Goal Met? 1. Increase score on FOTO to 65/100 indicating improved function. Status at last Eval: 52 Current Status: Not retested n/a   2. Patient will report decreased c/o pain to < or = 6/10 to facilitate improved quality of sleep with manageable sx in the left shoulder. Status at last Eval: 7-8/10 max Current Status: 5/10 max recently yes   3. Increase left Sh AROM in flexion to 150 deg to facilitate reaching into overhead cabinets. Status at last Eval: 154 Current Status: Not retested yes   Key Functional Changes/Progress: Pt made fair progress with therapy. Continued moderate pain and some increaesd pain after activity. Pt began treatment on knee and DC'd shoulder rehab. Assessments/Recommendations: Other: DC to HEP. If you have any questions/comments please contact us directly at [phone]. Thank you for allowing us to assist in the care of your patient.     Therapist Signature: Robert Ulloa PT, DPT, OCS, CSCS Date: 22   Reporting Period: 3/28/22 - 22 Time: 3:32 PM

## 2022-08-15 NOTE — PROGRESS NOTES
DENISE [address], [address],[facility], 70 [facility] - Phone: [phone]  Fax: [phone]  DISCHARGE SUMMARY FOR PHYSICAL THERAPY          Patient Name: Chelsey Fernandes : 1945   Treatment/Medical Diagnosis: Right knee pain [M25.561]   Onset Date: 22    Referral Source: Ryan Garcia MD [facility]): 22   Prior Hospitalization: See Medical History Provider #: [id number]   Prior Level of Function: I with painful ambulation and prolonged standing   Comorbidities: H/p prostrate CA (), htn l4/5 surgery, lbp, thyroid problemss, L TKA (21), h/s L LE DVT   Medications: Verified on Patient Summary List   Visits from [facility]: 34 Missed Visits: 0     Goal/Measure of Progress Goal Met? 1. Increase R knee strength 4+/5 to navigate stairs appropriately    Status at last Eval: 4-/5 Current Status: 5/5 yes   2. Increase knee rom 120 to A with squats   Status at last Eval: 112 Current Status: 123 yes   3. I with advanced hep in order to prepare for dc   Status at last Eval: na Current Status:  yes     Key Functional Changes/Progress: goals achieved appropriate for dc     Assessments/Recommendations: Discontinue therapy. Progressing towards or have reached established goals. If you have any questions/comments please contact us directly at [phone]. Thank you for allowing us to assist in the care of your patient.     Therapist Signature: Jessika Loco PT Date: 22   Reporting Period: 22-22 Time: 2:35 PM

## 2023-02-15 ENCOUNTER — HOSPITAL ENCOUNTER (OUTPATIENT)
Facility: HOSPITAL | Age: 78
Setting detail: RECURRING SERIES
Discharge: HOME | End: 2023-02-18
Payer: MEDICARE

## 2023-02-15 PROCEDURE — 97162 PT EVAL MOD COMPLEX 30 MIN: CPT

## 2023-02-15 PROCEDURE — 97110 THERAPEUTIC EXERCISES: CPT

## 2023-02-15 NOTE — THERAPY EVALUATION
[address] [place] [address], 45 [facility], [address] [place] [address] LE:[phone] Fx: [phone]  Plan of Care / Statement of Necessity for Physical Therapy Services     Patient Name: Yvonne Leyva : 1945   Medical   Diagnosis: LBP Treatment Diagnosis: LBP   Onset Date: Chronic     Referral Source: Julia Villarreal MD Start of Care [facility]): 2/15/2023   Prior Hospitalization: See medical history Provider #: [id number]   Prior Level of Function: On and off LBP affecting functional mobility    Comorbidities:  Back pain, Cancer, High Blood Pressure, Prior Surgery, MIGUEL ÁNGEL TKR   Medications: Verified on Patient Summary List     Subjective information: Patient is a 76 y.o. male who presents to [facility] with diagnosis of LBP. 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 Worsening in   secondary disc herniation and underwent surgery. H/O  PT services with benefit, however, hasn't been performing exercise. Recent MRI of the L/S, however, results are unknown. Back pain is described as intermittent/constant and located on the left side of the back and hip. Pt denies numbness and tingling in MIGUEL ÁNGEL LEs. Pain level is rated at 1/10 at the best, 3-4/10 currently, and 8-9/10 at the worst. LBP increases with slouching, increased physical activity, rising in the morning, and hiking decreases with rest and back brace. Assessment / key information: Upon objective evaluation pt presents with poor posture, MIGUEL ÁNGEL glute/core weakness, and decreased MIGUEL ÁNGEL hamstring mm length. Patient scored 58/100 on FOTO indicating decreased functional status and quality of life.  Patient can benefit from skilled PT interventions to improve L/S ROM, flexibility, core strength, decrease pain and TTP and for education on posture, body mechanics and lifting mechanics/transfers to facilitate ADL's & overall functional status/quality of life. Objective Measures:   General Health:  Red Flags Indicated? [] Yes    [x] No    Posture:   Lateral Shift: (-); forward trunk flexion   Kyphosis: moderately increased   Lordosis: decreased       L/S AROM: Flexion 75% L LBP, Extension <25%, R/L SB 50/25, R/L Rotation 75/100  Repeated Movements: BEAU - L L/S and central  Dermatomes: decr R L3 light touch Sensation WNLs (possibly d/t h/o R TKR)    Special Tests:  R/L SLR: -/-    R/L Slump: -/-    R/L Hamstrings 90/90: 14/138 deg  MIGUEL ÁNGEL SIJ Compression/Distraction: -/-    Palpation: moderate tenderness to palpation to L L/S paraspinals and L piriformis; decreased capsular joint mobility in mid T/S and L/S    MMT Strength  L(0-5) R (0-5)   Psoas (L1,2) 5 5   Quadriceps (L3,4) 5- 4+   Ant Tibialis (L4) 4 5-   Gluteus Medius (L5) 3+ 3 p! Hamstring (S1,2) 5- 4+   Gluteus Lamont (S1, S2) 2+ 2+   Bridge  65%   SLR lift abdominal test NT     Evaluation Complexity:  History:  HIGH Complexity :3+ comorbidities / personal factors will impact the outcome/ POC ; Examination:  MEDIUM Complexity : 3 Standardized tests and measures addressin body structure, function, activity limitation and / or participation in recreation  ;Presentation:  MEDIUM Complexity : Evolving with changing characteristics  ; Clinical Decision Making:  MEDIUM Complexity : FOTO score of 26-74 FOTO score = an established functional score where 100 = no disability  Overall Complexity Rating: MEDIUM  Problem List: pain affecting function, decrease ROM, decrease strength, edema affection function, impaired gait/balance, decrease ADL/functional abilities, decrease activity tolerance, decrease flexibility/joint mobility, and decrease transfer abilities    Treatment Plan may include any combination of the followin Therapeutic Exercise, 35934 Neuromuscular Re-Education, 00943 Manual Therapy, 78440 Therapeutic Activity, 01204 Self Care/Home Management, 60250 Electrical Stim unattended, 05466 Electrical Stim attended, 97926 Vasopneumatic Device, [id number] Aquatic Therapy, [id number] Gait Training, [id number] Ultrasound, [id number] Mechanical Traction, [id number] Needle Insertion w/o Injection (1 or 2 muscles), 49435 Needle Insertion w/o Injection (3+ muscles), 97004 Canalith Repositioning, and 91356 Orthotic Management and Training  Patient / Family readiness to learn indicated by: asking questions, trying to perform skills, interest, return verbalization , and return demonstration   Persons(s) to be included in education: patient (P)  Barriers to Learning/Limitations: none  Measures taken if barriers to learning present:   Patient Goal (s): lessen pain  Patient Self Reported Health Status: good  Rehabilitation Potential: fair    Short Term Goals: To be accomplished in 2 weeks  1) Establish HEP. 2) Patient will report decreased c/o pain to < or = 7/10 at the worst to facilitate performance of ADLs with manageable sx in L/S. Long Term Goals: To be accomplished in 6 weeks  1) Patient will report decreased c/o pain to < or = 5/10 at the worst to facilitate performance of ADLs with manageable sx in L/S.  2) Patient will increase L/S ROM to pain free and WNLs to increase ability to perform household chores. 3) Increase FOTO to 61/100 indicating improved function and quality of life. 4) Patient to perform 85% bridge indicating improved core strength to improve ambulation around the grocery store.      Frequency / Duration: Patient to be seen 1-2 times per week for 6 weeks    Patient/ Caregiver education and instruction: Diagnosis, prognosis, self care, activity modification, brace/ splint application, and exercises [x]  Plan of care has been reviewed with PTA    Certification Period: 2/15/2023 to 5/14/2023    Jessie Ham, PT       2/15/2023       4:08 PM  ===================================================================  I certify that the above Therapy Services are being furnished while the patient is under my care. I agree with the treatment plan and certify that this therapy is necessary. [de-identified] Signature:_________________________   DATE:_________   TIME:________                           Gino Tee MD    ** Signature, Date and Time must be completed for valid certification **  Please sign and fax to [facility] [phone].   Thank you

## 2023-02-15 NOTE — PROGRESS NOTES
PHYSICAL / OCCUPATIONAL THERAPY - DAILY TREATMENT NOTE (updated )  For Eval visit    Patient Name: Jose Eduardo Thornton    Date: 2/15/2023    : 1945  Insurance: Payor: MEDICARE / Plan: MEDICARE PART A AND B / Product Type: *No Product type* /      Patient  verified yes     Visit #   Current / Total 1 12   Time   In / Out 1130 1207   Pain   In / Out 3-4 3-4   Subjective Functional Status/Changes: See POC   Changes to:  Meds, Allergies, Med Hx, Sx Hx? If yes, update Summary List no       TREATMENT AREA =  No admission diagnoses are documented for this encounter. OBJECTIVE        Back pain, Cancer, High Blood Pressure, Prior Surgery    28 min   Eval - untimed                      Therapeutic Procedures: Tx Min Billable or 1:1 Min (if diff from Tx Min) Procedure, Rationale, Specifics   9  67164 Therapeutic Exercise (timed):  increase ROM, strength, coordination, balance, and proprioception to improve patient's ability to progress to PLOF and address remaining functional goals. (see flow sheet as applicable)     Details if applicable:         73397 Neuromuscular Re-Education (timed):  improve balance, coordination, kinesthetic sense, posture, core stability and proprioception to improve patient's ability to develop conscious control of individual muscles and awareness of position of extremities in order to progress to PLOF and address remaining functional goals. (see flow sheet as applicable)     Details if applicable:       06481 Manual Therapy (timed):  decrease pain, increase ROM, and increase tissue extensibility to improve patient's ability to progress to PLOF and address remaining functional goals. The manual therapy interventions were performed at a separate and distinct time from the therapeutic activities interventions .  (see flow sheet as applicable)     Details if applicable:       25399 Therapeutic Activity (timed):  use of dynamic activities replicating functional movements to increase ROM, strength, coordination, balance, and proprioception in order to improve patient's ability to progress to PLOF and address remaining functional goals. (see flow sheet as applicable)     Details if applicable:       06445 Self Care/Home Management (timed):  improve patient knowledge and understanding of pain reducing techniques, positioning, posture/ergonomics, home safety, activity modification, diagnosis/prognosis, and physical therapy expectations, procedures and progression  to improve patient's ability to progress to PLOF and address remaining functional goals. (see flow sheet as applicable)     Details if applicable:       52782 Gait Training (timed):  __30_ feet without assisitve device on level surfaces with _SBA__ level of assistance while performing HHT/VHT, EC, retro, and tandem To improve safety and dynamic movement with household/community ambulation.   (see flow sheet as applicable)      9  [facility] Totals Reminder: bill using total billable min of TIMED therapeutic procedures (example: do not include dry needle or estim unattended, both untimed codes, in totals to left)  8-22 min = 1 unit; 23-37 min = 2 units; 38-52 min = 3 units; 53-67 min = 4 units; 68-82 min = 5 units   Total Total     [x]  Patient Education billed concurrently with other procedures   [x] Review HEP    [] Progressed/Changed HEP, detail:    [] Other detail:       Objective Information/Functional Measures/Assessment    See POC    Patient will continue to benefit from skilled PT / OT services to modify and progress therapeutic interventions, analyze and address functional mobility deficits, analyze and address ROM deficits, analyze and address strength deficits, analyze and address soft tissue restrictions, analyze and cue for proper movement patterns, analyze and modify for postural abnormalities, analyze and address imbalance/dizziness, and instruct in home and community integration to address functional deficits and attain remaining goals.    Progress toward goals / Updated goals:  [x]  See POC    See Established Goals in POC    PLAN  yes Continue plan of care  []  Upgrade activities as tolerated  []  Discharge due to :  [x]  Other: Established HEP     Conchis, PT    2/15/2023    11:24 AM    Future Appointments   Date Time Provider Gamal Ochoa   2/15/2023 11:30 AM Conchis PT [facility]   5/11/2023 11:45 AM Rekha Singh MD [facility] Lyn[place] Sched   1/10/2024 11:30 AM [facility] ULTRASOUND [facility] Ania Sched   1/17/2024 11:15 AM Rekha Singh MD [facility] Yariel Ramirez

## 2023-02-27 ENCOUNTER — TELEPHONE (OUTPATIENT)
Facility: HOSPITAL | Age: 78
End: 2023-02-27

## 2023-02-28 ENCOUNTER — APPOINTMENT (OUTPATIENT)
Facility: HOSPITAL | Age: 78
End: 2023-02-28
Payer: MEDICARE

## 2023-03-06 ENCOUNTER — HOSPITAL ENCOUNTER (OUTPATIENT)
Facility: HOSPITAL | Age: 78
Setting detail: RECURRING SERIES
Discharge: HOME | End: 2023-03-09
Payer: MEDICARE

## 2023-03-06 PROCEDURE — 97110 THERAPEUTIC EXERCISES: CPT

## 2023-03-06 PROCEDURE — 97140 MANUAL THERAPY 1/> REGIONS: CPT

## 2023-03-06 NOTE — PROGRESS NOTES
PHYSICAL / OCCUPATIONAL THERAPY - DAILY TREATMENT NOTE (updated )    Patient Name: Pao Vincent    Date: 3/6/2023    : 1945  Insurance: Payor: MEDICARE / Plan: MEDICARE PART A AND B / Product Type: *No Product type* /      Patient  verified yes     Visit #   Current / Total 2 12   Time   In / Out 333 403   Pain   In / Out 4 <2   Subjective Functional Status/Changes: Reports soreness in the back; left wrist injection   Changes to:  Meds, Allergies, Med Hx, Sx Hx? If yes, update Summary List no       TREATMENT AREA =  No admission diagnoses are documented for this encounter. OBJECTIVE        Therapeutic Procedures: Tx Min Billable or 1:1 Min (if diff from Tx Min) Procedure, Rationale, Specifics   20  69601 Therapeutic Exercise (timed):  increase ROM, strength, coordination, balance, and proprioception to improve patient's ability to progress to PLOF and address remaining functional goals. (see flow sheet as applicable)     Details if applicable: Addition of sit to stand; reviewed lumbar roll and soft brace       24497 Neuromuscular Re-Education (timed):  improve balance, coordination, kinesthetic sense, posture, core stability and proprioception to improve patient's ability to develop conscious control of individual muscles and awareness of position of extremities in order to progress to PLOF and address remaining functional goals. (see flow sheet as applicable)     Details if applicable:     10  05494 Manual Therapy (timed):  decrease pain, increase ROM, and increase tissue extensibility to improve patient's ability to progress to PLOF and address remaining functional goals. The manual therapy interventions were performed at a separate and distinct time from the therapeutic activities interventions .  (see flow sheet as applicable)     Details if applicable:  Prone STM to MIGUEL ÁNGEL lower T/S and upper L/S, MIGUEL ÁNGEL Ham stretch, MIGUEL ÁNGEL Piriformis release     55552 Therapeutic Activity (timed):  use of dynamic activities replicating functional movements to increase ROM, strength, coordination, balance, and proprioception in order to improve patient's ability to progress to PLOF and address remaining functional goals. (see flow sheet as applicable)     Details if applicable:       12355 Self Care/Home Management (timed):  improve patient knowledge and understanding of pain reducing techniques, positioning, posture/ergonomics, home safety, activity modification, diagnosis/prognosis, and physical therapy expectations, procedures and progression  to improve patient's ability to progress to PLOF and address remaining functional goals. (see flow sheet as applicable)     Details if applicable:       51202 Gait Training (timed):  __30_ feet without assisitve device on level surfaces with _SBA__ level of assistance while performing HHT/VHT, EC, retro, and tandem to improve safety and dynamic movement with household/community ambulation. (see flow sheet as applicable)      30  [facility] Totals Reminder: bill using total billable min of TIMED therapeutic procedures (example: do not include dry needle or estim unattended, both untimed codes, in totals to left)  8-22 min = 1 unit; 23-37 min = 2 units; 38-52 min = 3 units; 53-67 min = 4 units; 68-82 min = 5 units   Total Total     [x]  Patient Education billed concurrently with other procedures   [x] Review HEP    [] Progressed/Changed HEP, detail:    [] Other detail:       Objective Information/Functional Measures/Assessment  MRI of L/S Stenosis/impingement:    Central stenosis: Mild to moderate multilevel. Lateral recess stenosis: Marked right L5-S1, moderate bilateral L3-L4-L5. Foraminal stenosis: High-grade left L3-L4, right L4-L5, bilateral L5-S1. Structure: Low-grade scoliosis with advanced asymmetric disc and facet degeneration. Assistance with proper wear of soft back supporyt brace and Lumbar roll. Demonstrates R>L mm hamstring restrictions.  Cuing with hamstring stretch for proper performance. Patient education on rationale of BEAU. Following MT in BEAU patient denied pain    Patient will continue to benefit from skilled PT / OT services to modify and progress therapeutic interventions, analyze and address functional mobility deficits, analyze and address ROM deficits, analyze and address strength deficits, analyze and address soft tissue restrictions, analyze and cue for proper movement patterns, analyze and modify for postural abnormalities, analyze and address imbalance/dizziness, and instruct in home and community integration to address functional deficits and attain remaining goals.    Progress toward goals / Updated goals:  []  See Progress Note/Recertification    First visit after initial evaluation. Progress tx per POC.     PLAN  yes Continue plan of care  []  Upgrade activities as tolerated  []  Discharge due to :  []  Other:    Ronda Flood PT    3/6/2023    11:22 AM    Future Appointments   Date Time Provider Department Center   3/6/2023  3:30 PM Ronda Flood, PT [facility]   3/13/2023  3:30 PM Ronda Flood, PT [facility]   3/20/2023  3:30 PM Ronda Flood, PT [facility]   3/27/2023  3:30 PM Ronda Flood, PT [facility]   4/3/2023  3:30 PM Ronda Flood, PT [facility]   5/11/2023 11:45 AM Alverto Rincon MD [facility] Ania Sched   1/10/2024 11:30 AM [facility] ULTRASOUND [facility] [place] Sched   1/17/2024 11:15 AM Alverto Rincon MD [facility] [place] Sched

## 2023-03-20 ENCOUNTER — HOSPITAL ENCOUNTER (OUTPATIENT)
Facility: HOSPITAL | Age: 78
Setting detail: RECURRING SERIES
Discharge: HOME | End: 2023-03-23
Payer: MEDICARE

## 2023-03-20 PROCEDURE — 97110 THERAPEUTIC EXERCISES: CPT

## 2023-03-20 PROCEDURE — 97140 MANUAL THERAPY 1/> REGIONS: CPT

## 2023-03-20 NOTE — THERAPY RECERTIFICATION
combination of the followin Therapeutic Exercise, 10543 Neuromuscular Re-Education, 04442 Manual Therapy, 19771 Therapeutic Activity, 26077 Self Care/Home Management, 57374 Electrical Stim unattended, 62708 Electrical Stim attended, 79220 Vasopneumatic Device, [id number] Aquatic Therapy, [id number] Gait Training, [id number] Ultrasound, [id number] Mechanical Traction, [id number] Needle Insertion w/o Injection (1 or 2 muscles), 41460 Needle Insertion w/o Injection (3+ muscles), 23440 Canalith Repositioning, and 79273 Orthotic Management and Training  Patient Goal(s) has been updated and includes:   Goals for this certification period include and are to be achieved in   6-12  weeks:  Patient will report decreased c/o pain to < or = 5/10 at the worst to facilitate performance of ADLs with manageable sx in L/S. Patient will increase L/S ROM to pain free and WNLs to increase ability to perform household chores. Increase FOTO to 61/100 indicating improved function and quality of life. Patient to perform 85% bridge indicating improved core strength to improve ambulation around the grocery store. Frequency / Duration:   Patient to be seen   2-3   times per week for  6-12    weeks:    Assessments/Recommendations: Continue with recommendation above    If you have any questions/comments please contact us directly at [phone]. Thank you for allowing us to assist in the care of your patient. Certification Period: 2/15/2023 to 2023  Reporting Period : 2/15/2023 to 3/20/2023     Conchis, PT       3/20/2023       10:30 AM      ___ I have read the above report and request that my patient continue as recommended.   ___ I have read the above report and request that my patient continue therapy with the following changes/special instructions: ________________________________________________   ___ I have read the above report and request that my patient be discharged from therapy.      [address] [place] [address]

## 2023-03-20 NOTE — PROGRESS NOTES
movements to increase ROM, strength, coordination, balance, and proprioception in order to improve patient's ability to progress to PLOF and address remaining functional goals.  (see flow sheet as applicable)     Details if applicable:       27138 Self Care/Home Management (timed):  improve patient knowledge and understanding of pain reducing techniques, positioning, posture/ergonomics, home safety, activity modification, diagnosis/prognosis, and physical therapy expectations, procedures and progression  to improve patient's ability to progress to PLOF and address remaining functional goals.  (see flow sheet as applicable)     Details if applicable:       14900 Gait Training (timed):  __30_ feet without assisitve device on level surfaces with _SBA__ level of assistance while performing HHT/VHT, EC, retro, and tandem to improve safety and dynamic movement with household/community ambulation.  (see flow sheet as applicable)      35  [facility] Totals Reminder: bill using total billable min of TIMED therapeutic procedures (example: do not include dry needle or estim unattended, both untimed codes, in totals to left)  8-22 min = 1 unit; 23-37 min = 2 units; 38-52 min = 3 units; 53-67 min = 4 units; 68-82 min = 5 units   Total Total     [x]  Patient Education billed concurrently with other procedures   [x] Review HEP    [] Progressed/Changed HEP, detail:    [] Other detail:       Objective Information/Functional Measures/Assessment    See PN    Patient will continue to benefit from skilled PT / OT services to modify and progress therapeutic interventions, analyze and address functional mobility deficits, analyze and address ROM deficits, analyze and address strength deficits, analyze and address soft tissue restrictions, analyze and cue for proper movement patterns, analyze and modify for postural abnormalities, analyze and address imbalance/dizziness, and instruct in home and community integration to address functional

## 2023-03-27 ENCOUNTER — HOSPITAL ENCOUNTER (OUTPATIENT)
Facility: HOSPITAL | Age: 78
Setting detail: RECURRING SERIES
Discharge: HOME | End: 2023-03-30
Payer: MEDICARE

## 2023-03-27 PROCEDURE — 97110 THERAPEUTIC EXERCISES: CPT

## 2023-03-27 NOTE — PROGRESS NOTES
PHYSICAL / OCCUPATIONAL THERAPY - DAILY TREATMENT NOTE (updated )    Patient Name: Niharika July    Date: 3/27/2023    : 1945  Insurance: Payor: MEDICARE / Plan: MEDICARE PART A AND B / Product Type: *No Product type* /      Patient  verified yes     Visit #   Current / Total 4 12   Time   In / Out 329 359   Pain   In / Out 4-5 0   Subjective Functional Status/Changes: Im terrible - I cant eat; back is good - weeded yesterday    Changes to:  Meds, Allergies, Med Hx, Sx Hx? If yes, update Summary List no       TREATMENT AREA =  No admission diagnoses are documented for this encounter. OBJECTIVE        Therapeutic Procedures: Tx Min Billable or 1:1 Min (if diff from Tx Min) Procedure, Rationale, Specifics   30  24427 Therapeutic Exercise (timed):  increase ROM, strength, coordination, balance, and proprioception to improve patient's ability to progress to PLOF and address remaining functional goals. (see flow sheet as applicable)     Details if applicable: Addition of standing hip ABD       71197 Neuromuscular Re-Education (timed):  improve balance, coordination, kinesthetic sense, posture, core stability and proprioception to improve patient's ability to develop conscious control of individual muscles and awareness of position of extremities in order to progress to PLOF and address remaining functional goals. (see flow sheet as applicable)     Details if applicable:     NT  80649 Manual Therapy (timed):  decrease pain, increase ROM, and increase tissue extensibility to improve patient's ability to progress to PLOF and address remaining functional goals. The manual therapy interventions were performed at a separate and distinct time from the therapeutic activities interventions .  (see flow sheet as applicable)     Details if applicable:  Prone STM to MIGUEL ÁNGEL lower T/S and upper L/S, MIGUEL ÁNGEL Piriformis release     55874 Therapeutic Activity (timed):  use of dynamic activities replicating functional